# Patient Record
Sex: MALE | Race: WHITE | NOT HISPANIC OR LATINO | Employment: OTHER | URBAN - NONMETROPOLITAN AREA
[De-identification: names, ages, dates, MRNs, and addresses within clinical notes are randomized per-mention and may not be internally consistent; named-entity substitution may affect disease eponyms.]

---

## 2020-10-23 ENCOUNTER — APPOINTMENT (OUTPATIENT)
Dept: CT IMAGING | Facility: HOSPITAL | Age: 85
End: 2020-10-23

## 2020-10-23 ENCOUNTER — APPOINTMENT (OUTPATIENT)
Dept: GENERAL RADIOLOGY | Facility: HOSPITAL | Age: 85
End: 2020-10-23

## 2020-10-23 ENCOUNTER — HOSPITAL ENCOUNTER (INPATIENT)
Facility: HOSPITAL | Age: 85
LOS: 20 days | Discharge: NURSING, REHAB OR LONG-TERM CARE | End: 2020-11-12
Attending: EMERGENCY MEDICINE | Admitting: FAMILY MEDICINE

## 2020-10-23 DIAGNOSIS — J44.9 CHRONIC OBSTRUCTIVE PULMONARY DISEASE, UNSPECIFIED COPD TYPE (HCC): ICD-10-CM

## 2020-10-23 DIAGNOSIS — R74.01 TRANSAMINITIS: ICD-10-CM

## 2020-10-23 DIAGNOSIS — J18.9 PNEUMONIA OF BOTH LUNGS DUE TO INFECTIOUS ORGANISM, UNSPECIFIED PART OF LUNG: ICD-10-CM

## 2020-10-23 DIAGNOSIS — J96.21 ACUTE ON CHRONIC RESPIRATORY FAILURE WITH HYPOXIA (HCC): ICD-10-CM

## 2020-10-23 DIAGNOSIS — U07.1 COVID-19: Primary | ICD-10-CM

## 2020-10-23 DIAGNOSIS — N17.9 ACUTE RENAL FAILURE, UNSPECIFIED ACUTE RENAL FAILURE TYPE (HCC): ICD-10-CM

## 2020-10-23 DIAGNOSIS — J96.01 ACUTE RESPIRATORY FAILURE WITH HYPOXIA (HCC): ICD-10-CM

## 2020-10-23 PROBLEM — A41.9 SEPSIS, UNSPECIFIED ORGANISM (HCC): Status: ACTIVE | Noted: 2020-10-23

## 2020-10-23 LAB
A-A DO2: 293.7 MMHG
ALBUMIN SERPL-MCNC: 3.8 G/DL (ref 3.5–5.2)
ALBUMIN/GLOB SERPL: 1 G/DL
ALP SERPL-CCNC: 50 U/L (ref 39–117)
ALT SERPL W P-5'-P-CCNC: 64 U/L (ref 1–41)
ANION GAP SERPL CALCULATED.3IONS-SCNC: 16.2 MMOL/L (ref 5–15)
ARTERIAL PATENCY WRIST A: POSITIVE
AST SERPL-CCNC: 62 U/L (ref 1–40)
ATMOSPHERIC PRESS: 735 MMHG
BACTERIA UR QL AUTO: ABNORMAL /HPF
BASE EXCESS BLDA CALC-SCNC: -1.3 MMOL/L (ref 0–2)
BASOPHILS # BLD AUTO: 0.04 10*3/MM3 (ref 0–0.2)
BASOPHILS NFR BLD AUTO: 0.2 % (ref 0–1.5)
BDY SITE: ABNORMAL
BILIRUB SERPL-MCNC: 1 MG/DL (ref 0–1.2)
BILIRUB UR QL STRIP: ABNORMAL
BUN SERPL-MCNC: 105 MG/DL (ref 8–23)
BUN/CREAT SERPL: 34.1 (ref 7–25)
CALCIUM SPEC-SCNC: 8.7 MG/DL (ref 8.6–10.5)
CHLORIDE SERPL-SCNC: 109 MMOL/L (ref 98–107)
CLARITY UR: ABNORMAL
CO2 SERPL-SCNC: 22.8 MMOL/L (ref 22–29)
COHGB MFR BLD: <0.6 % (ref 0–2)
COLOR UR: ABNORMAL
CREAT SERPL-MCNC: 3.08 MG/DL (ref 0.76–1.27)
CRP SERPL-MCNC: 15.06 MG/DL (ref 0–0.5)
D-LACTATE SERPL-SCNC: 2 MMOL/L (ref 0.5–2)
DEPRECATED RDW RBC AUTO: 48.8 FL (ref 37–54)
EOSINOPHIL # BLD AUTO: 0 10*3/MM3 (ref 0–0.4)
EOSINOPHIL NFR BLD AUTO: 0 % (ref 0.3–6.2)
EPAP: 11
ERYTHROCYTE [DISTWIDTH] IN BLOOD BY AUTOMATED COUNT: 14.6 % (ref 12.3–15.4)
FERRITIN SERPL-MCNC: 2364 NG/ML (ref 30–400)
GFR SERPL CREATININE-BSD FRML MDRD: 19 ML/MIN/1.73
GLOBULIN UR ELPH-MCNC: 3.8 GM/DL
GLUCOSE BLDC GLUCOMTR-MCNC: 203 MG/DL (ref 70–130)
GLUCOSE SERPL-MCNC: 164 MG/DL (ref 65–99)
GLUCOSE UR STRIP-MCNC: NEGATIVE MG/DL
HCO3 BLDA-SCNC: 20.8 MMOL/L (ref 22–28)
HCT VFR BLD AUTO: 52.5 % (ref 37.5–51)
HCT VFR BLD CALC: 52 %
HGB BLD-MCNC: 17 G/DL (ref 13–17.7)
HGB UR QL STRIP.AUTO: NEGATIVE
HOLD SPECIMEN: NORMAL
HYALINE CASTS UR QL AUTO: ABNORMAL /LPF
IMM GRANULOCYTES # BLD AUTO: 0.1 10*3/MM3 (ref 0–0.05)
IMM GRANULOCYTES NFR BLD AUTO: 0.6 % (ref 0–0.5)
INHALED O2 CONCENTRATION: 70 %
IPAP: 20
KETONES UR QL STRIP: NEGATIVE
LEUKOCYTE ESTERASE UR QL STRIP.AUTO: NEGATIVE
LIPASE SERPL-CCNC: 18 U/L (ref 13–60)
LYMPHOCYTES # BLD AUTO: 0.48 10*3/MM3 (ref 0.7–3.1)
LYMPHOCYTES NFR BLD AUTO: 2.9 % (ref 19.6–45.3)
MCH RBC QN AUTO: 29.6 PG (ref 26.6–33)
MCHC RBC AUTO-ENTMCNC: 32.4 G/DL (ref 31.5–35.7)
MCV RBC AUTO: 91.3 FL (ref 79–97)
METHGB BLD QL: 1 % (ref 0–1.5)
MODALITY: ABNORMAL
MONOCYTES # BLD AUTO: 1.01 10*3/MM3 (ref 0.1–0.9)
MONOCYTES NFR BLD AUTO: 6.2 % (ref 5–12)
MUCOUS THREADS URNS QL MICRO: ABNORMAL /HPF
NEUTROPHILS NFR BLD AUTO: 14.65 10*3/MM3 (ref 1.7–7)
NEUTROPHILS NFR BLD AUTO: 90.1 % (ref 42.7–76)
NITRITE UR QL STRIP: NEGATIVE
NOTE: ABNORMAL
NRBC BLD AUTO-RTO: 0 /100 WBC (ref 0–0.2)
NT-PROBNP SERPL-MCNC: 988.6 PG/ML (ref 0–1800)
OXYHGB MFR BLDV: 98 % (ref 94–99)
PCO2 BLDA: 28.7 MM HG (ref 35–45)
PCO2 TEMP ADJ BLD: ABNORMAL MM[HG]
PH BLDA: 7.47 PH UNITS (ref 7.3–7.5)
PH UR STRIP.AUTO: <=5 [PH] (ref 5–8)
PH, TEMP CORRECTED: ABNORMAL
PLATELET # BLD AUTO: 227 10*3/MM3 (ref 140–450)
PMV BLD AUTO: 11.4 FL (ref 6–12)
PO2 BLDA: 157 MM HG (ref 75–100)
PO2 TEMP ADJ BLD: ABNORMAL MM[HG]
POTASSIUM SERPL-SCNC: 5.3 MMOL/L (ref 3.5–5.2)
PROCALCITONIN SERPL-MCNC: 4.62 NG/ML (ref 0–0.25)
PROT SERPL-MCNC: 7.6 G/DL (ref 6–8.5)
PROT UR QL STRIP: ABNORMAL
RBC # BLD AUTO: 5.75 10*6/MM3 (ref 4.14–5.8)
RBC # UR: ABNORMAL /HPF
REF LAB TEST METHOD: ABNORMAL
SAO2 % BLDCOA: 99.1 % (ref 94–100)
SARS-COV-2 RNA PNL SPEC NAA+PROBE: DETECTED
SET MECH RESP RATE: 16
SODIUM SERPL-SCNC: 148 MMOL/L (ref 136–145)
SP GR UR STRIP: 1.02 (ref 1–1.03)
SQUAMOUS #/AREA URNS HPF: ABNORMAL /HPF
TROPONIN T SERPL-MCNC: 0.03 NG/ML (ref 0–0.03)
TSH SERPL DL<=0.05 MIU/L-ACNC: 0.2 UIU/ML (ref 0.27–4.2)
UROBILINOGEN UR QL STRIP: ABNORMAL
VENTILATOR MODE: ABNORMAL
WBC # BLD AUTO: 16.28 10*3/MM3 (ref 3.4–10.8)
WBC UR QL AUTO: ABNORMAL /HPF
WHOLE BLOOD HOLD SPECIMEN: NORMAL

## 2020-10-23 PROCEDURE — 86140 C-REACTIVE PROTEIN: CPT | Performed by: EMERGENCY MEDICINE

## 2020-10-23 PROCEDURE — 94799 UNLISTED PULMONARY SVC/PX: CPT

## 2020-10-23 PROCEDURE — 84443 ASSAY THYROID STIM HORMONE: CPT | Performed by: EMERGENCY MEDICINE

## 2020-10-23 PROCEDURE — 83605 ASSAY OF LACTIC ACID: CPT | Performed by: EMERGENCY MEDICINE

## 2020-10-23 PROCEDURE — 99223 1ST HOSP IP/OBS HIGH 75: CPT | Performed by: EMERGENCY MEDICINE

## 2020-10-23 PROCEDURE — 94660 CPAP INITIATION&MGMT: CPT

## 2020-10-23 PROCEDURE — 25010000002 ENOXAPARIN PER 10 MG: Performed by: EMERGENCY MEDICINE

## 2020-10-23 PROCEDURE — 71250 CT THORAX DX C-: CPT

## 2020-10-23 PROCEDURE — 93005 ELECTROCARDIOGRAM TRACING: CPT | Performed by: EMERGENCY MEDICINE

## 2020-10-23 PROCEDURE — 83690 ASSAY OF LIPASE: CPT | Performed by: EMERGENCY MEDICINE

## 2020-10-23 PROCEDURE — 82728 ASSAY OF FERRITIN: CPT | Performed by: EMERGENCY MEDICINE

## 2020-10-23 PROCEDURE — 82805 BLOOD GASES W/O2 SATURATION: CPT

## 2020-10-23 PROCEDURE — 71045 X-RAY EXAM CHEST 1 VIEW: CPT

## 2020-10-23 PROCEDURE — 81001 URINALYSIS AUTO W/SCOPE: CPT | Performed by: EMERGENCY MEDICINE

## 2020-10-23 PROCEDURE — 82962 GLUCOSE BLOOD TEST: CPT

## 2020-10-23 PROCEDURE — 25010000002 METHYLPREDNISOLONE PER 125 MG: Performed by: EMERGENCY MEDICINE

## 2020-10-23 PROCEDURE — 85025 COMPLETE CBC W/AUTO DIFF WBC: CPT | Performed by: EMERGENCY MEDICINE

## 2020-10-23 PROCEDURE — 99285 EMERGENCY DEPT VISIT HI MDM: CPT

## 2020-10-23 PROCEDURE — 25010000002 LEVOFLOXACIN PER 250 MG: Performed by: EMERGENCY MEDICINE

## 2020-10-23 PROCEDURE — 25010000002 CEFTRIAXONE SODIUM-DEXTROSE 1-3.74 GM-%(50ML) RECONSTITUTED SOLUTION: Performed by: EMERGENCY MEDICINE

## 2020-10-23 PROCEDURE — 25010000002 AZITHROMYCIN 500 MG/250 ML: Performed by: EMERGENCY MEDICINE

## 2020-10-23 PROCEDURE — 83880 ASSAY OF NATRIURETIC PEPTIDE: CPT | Performed by: EMERGENCY MEDICINE

## 2020-10-23 PROCEDURE — 87040 BLOOD CULTURE FOR BACTERIA: CPT | Performed by: EMERGENCY MEDICINE

## 2020-10-23 PROCEDURE — P9612 CATHETERIZE FOR URINE SPEC: HCPCS

## 2020-10-23 PROCEDURE — 87635 SARS-COV-2 COVID-19 AMP PRB: CPT | Performed by: EMERGENCY MEDICINE

## 2020-10-23 PROCEDURE — 94640 AIRWAY INHALATION TREATMENT: CPT

## 2020-10-23 PROCEDURE — 25010000002 PIPERACILLIN SOD-TAZOBACTAM PER 1 G: Performed by: EMERGENCY MEDICINE

## 2020-10-23 PROCEDURE — 70450 CT HEAD/BRAIN W/O DYE: CPT

## 2020-10-23 PROCEDURE — 25010000002 DEXAMETHASONE PER 1 MG: Performed by: EMERGENCY MEDICINE

## 2020-10-23 PROCEDURE — 82375 ASSAY CARBOXYHB QUANT: CPT

## 2020-10-23 PROCEDURE — 84145 PROCALCITONIN (PCT): CPT | Performed by: EMERGENCY MEDICINE

## 2020-10-23 PROCEDURE — 80053 COMPREHEN METABOLIC PANEL: CPT | Performed by: EMERGENCY MEDICINE

## 2020-10-23 PROCEDURE — 83050 HGB METHEMOGLOBIN QUAN: CPT

## 2020-10-23 PROCEDURE — 84484 ASSAY OF TROPONIN QUANT: CPT | Performed by: EMERGENCY MEDICINE

## 2020-10-23 PROCEDURE — 36600 WITHDRAWAL OF ARTERIAL BLOOD: CPT

## 2020-10-23 RX ORDER — IPRATROPIUM BROMIDE AND ALBUTEROL SULFATE 2.5; .5 MG/3ML; MG/3ML
3 SOLUTION RESPIRATORY (INHALATION) ONCE
Status: DISCONTINUED | OUTPATIENT
Start: 2020-10-23 | End: 2020-10-27

## 2020-10-23 RX ORDER — BUDESONIDE AND FORMOTEROL FUMARATE DIHYDRATE 160; 4.5 UG/1; UG/1
2 AEROSOL RESPIRATORY (INHALATION)
Status: DISCONTINUED | OUTPATIENT
Start: 2020-10-23 | End: 2020-11-12 | Stop reason: HOSPADM

## 2020-10-23 RX ORDER — ALBUTEROL SULFATE 90 UG/1
2 AEROSOL, METERED RESPIRATORY (INHALATION) EVERY 4 HOURS PRN
COMMUNITY

## 2020-10-23 RX ORDER — SODIUM CHLORIDE 0.9 % (FLUSH) 0.9 %
10 SYRINGE (ML) INJECTION AS NEEDED
Status: DISCONTINUED | OUTPATIENT
Start: 2020-10-23 | End: 2020-11-12 | Stop reason: HOSPADM

## 2020-10-23 RX ORDER — ALBUTEROL SULFATE 90 UG/1
2 AEROSOL, METERED RESPIRATORY (INHALATION) EVERY 4 HOURS PRN
Status: DISCONTINUED | OUTPATIENT
Start: 2020-10-23 | End: 2020-11-12 | Stop reason: HOSPADM

## 2020-10-23 RX ORDER — SODIUM CHLORIDE 0.9 % (FLUSH) 0.9 %
10 SYRINGE (ML) INJECTION EVERY 12 HOURS SCHEDULED
Status: DISCONTINUED | OUTPATIENT
Start: 2020-10-23 | End: 2020-11-12 | Stop reason: HOSPADM

## 2020-10-23 RX ORDER — LEVOFLOXACIN 5 MG/ML
500 INJECTION, SOLUTION INTRAVENOUS
Status: DISCONTINUED | OUTPATIENT
Start: 2020-10-23 | End: 2020-10-25

## 2020-10-23 RX ORDER — TERAZOSIN 5 MG/1
5 CAPSULE ORAL NIGHTLY
Status: DISCONTINUED | OUTPATIENT
Start: 2020-10-23 | End: 2020-11-04

## 2020-10-23 RX ORDER — MONTELUKAST SODIUM 10 MG/1
10 TABLET ORAL NIGHTLY
Status: DISCONTINUED | OUTPATIENT
Start: 2020-10-23 | End: 2020-11-04

## 2020-10-23 RX ORDER — MONTELUKAST SODIUM 10 MG/1
10 TABLET ORAL NIGHTLY
COMMUNITY

## 2020-10-23 RX ORDER — METHYLPREDNISOLONE SODIUM SUCCINATE 125 MG/2ML
125 INJECTION, POWDER, LYOPHILIZED, FOR SOLUTION INTRAMUSCULAR; INTRAVENOUS ONCE
Status: COMPLETED | OUTPATIENT
Start: 2020-10-23 | End: 2020-10-23

## 2020-10-23 RX ORDER — LISINOPRIL 40 MG/1
40 TABLET ORAL DAILY
COMMUNITY
End: 2020-11-12 | Stop reason: HOSPADM

## 2020-10-23 RX ORDER — CEFTRIAXONE 1 G/50ML
1 INJECTION, SOLUTION INTRAVENOUS ONCE
Status: COMPLETED | OUTPATIENT
Start: 2020-10-23 | End: 2020-10-23

## 2020-10-23 RX ORDER — DEXAMETHASONE SODIUM PHOSPHATE 4 MG/ML
6 INJECTION, SOLUTION INTRA-ARTICULAR; INTRALESIONAL; INTRAMUSCULAR; INTRAVENOUS; SOFT TISSUE EVERY 12 HOURS
Status: DISCONTINUED | OUTPATIENT
Start: 2020-10-23 | End: 2020-10-26

## 2020-10-23 RX ORDER — ASPIRIN 81 MG/1
81 TABLET, CHEWABLE ORAL DAILY
Status: DISCONTINUED | OUTPATIENT
Start: 2020-10-23 | End: 2020-11-12 | Stop reason: HOSPADM

## 2020-10-23 RX ORDER — TERAZOSIN 5 MG/1
5 CAPSULE ORAL NIGHTLY
COMMUNITY

## 2020-10-23 RX ORDER — CEFTRIAXONE 1 G/50ML
1 INJECTION, SOLUTION INTRAVENOUS EVERY 24 HOURS
Status: DISCONTINUED | OUTPATIENT
Start: 2020-10-24 | End: 2020-10-25

## 2020-10-23 RX ORDER — BUDESONIDE AND FORMOTEROL FUMARATE DIHYDRATE 160; 4.5 UG/1; UG/1
2 AEROSOL RESPIRATORY (INHALATION)
COMMUNITY

## 2020-10-23 RX ORDER — PANTOPRAZOLE SODIUM 40 MG/1
40 TABLET, DELAYED RELEASE ORAL EVERY MORNING
Status: DISCONTINUED | OUTPATIENT
Start: 2020-10-24 | End: 2020-11-04

## 2020-10-23 RX ORDER — ACETAMINOPHEN 325 MG/1
650 TABLET ORAL EVERY 6 HOURS PRN
COMMUNITY

## 2020-10-23 RX ADMIN — AZITHROMYCIN 500 MG: 500 INJECTION, POWDER, LYOPHILIZED, FOR SOLUTION INTRAVENOUS at 17:15

## 2020-10-23 RX ADMIN — LEVOFLOXACIN 500 MG: 5 INJECTION, SOLUTION INTRAVENOUS at 20:25

## 2020-10-23 RX ADMIN — REMDESIVIR 200 MG: 100 INJECTION, POWDER, LYOPHILIZED, FOR SOLUTION INTRAVENOUS at 20:26

## 2020-10-23 RX ADMIN — SODIUM CHLORIDE 1000 ML: 9 INJECTION, SOLUTION INTRAVENOUS at 16:28

## 2020-10-23 RX ADMIN — DEXAMETHASONE SODIUM PHOSPHATE 6 MG: 4 INJECTION, SOLUTION INTRAMUSCULAR; INTRAVENOUS at 20:24

## 2020-10-23 RX ADMIN — METHYLPREDNISOLONE SODIUM SUCCINATE 125 MG: 125 INJECTION, POWDER, FOR SOLUTION INTRAMUSCULAR; INTRAVENOUS at 15:47

## 2020-10-23 RX ADMIN — SODIUM CHLORIDE, PRESERVATIVE FREE 10 ML: 5 INJECTION INTRAVENOUS at 22:25

## 2020-10-23 RX ADMIN — CEFTRIAXONE 1 G: 1 INJECTION, SOLUTION INTRAVENOUS at 17:15

## 2020-10-23 RX ADMIN — TAZOBACTAM SODIUM AND PIPERACILLIN SODIUM 3.38 G: 375; 3 INJECTION, SOLUTION INTRAVENOUS at 20:25

## 2020-10-23 RX ADMIN — BUDESONIDE AND FORMOTEROL FUMARATE DIHYDRATE 2 PUFF: 160; 4.5 AEROSOL RESPIRATORY (INHALATION) at 21:14

## 2020-10-23 RX ADMIN — ENOXAPARIN SODIUM 40 MG: 40 INJECTION SUBCUTANEOUS at 20:25

## 2020-10-24 LAB
ALBUMIN SERPL-MCNC: 2.9 G/DL (ref 3.5–5.2)
ALBUMIN/GLOB SERPL: 0.8 G/DL
ALP SERPL-CCNC: 43 U/L (ref 39–117)
ALT SERPL W P-5'-P-CCNC: 53 U/L (ref 1–41)
ANION GAP SERPL CALCULATED.3IONS-SCNC: 16.2 MMOL/L (ref 5–15)
AST SERPL-CCNC: 59 U/L (ref 1–40)
BASOPHILS # BLD AUTO: 0.03 10*3/MM3 (ref 0–0.2)
BASOPHILS NFR BLD AUTO: 0.2 % (ref 0–1.5)
BILIRUB SERPL-MCNC: 0.5 MG/DL (ref 0–1.2)
BUN SERPL-MCNC: 110 MG/DL (ref 8–23)
BUN/CREAT SERPL: 39 (ref 7–25)
CALCIUM SPEC-SCNC: 7.7 MG/DL (ref 8.6–10.5)
CHLORIDE SERPL-SCNC: 116 MMOL/L (ref 98–107)
CO2 SERPL-SCNC: 18.8 MMOL/L (ref 22–29)
CREAT SERPL-MCNC: 2.82 MG/DL (ref 0.76–1.27)
DEPRECATED RDW RBC AUTO: 49.7 FL (ref 37–54)
EOSINOPHIL # BLD AUTO: 0 10*3/MM3 (ref 0–0.4)
EOSINOPHIL NFR BLD AUTO: 0 % (ref 0.3–6.2)
ERYTHROCYTE [DISTWIDTH] IN BLOOD BY AUTOMATED COUNT: 14.6 % (ref 12.3–15.4)
FERRITIN SERPL-MCNC: 1929 NG/ML (ref 30–400)
GFR SERPL CREATININE-BSD FRML MDRD: 22 ML/MIN/1.73
GLOBULIN UR ELPH-MCNC: 3.6 GM/DL
GLUCOSE BLDC GLUCOMTR-MCNC: 197 MG/DL (ref 70–130)
GLUCOSE BLDC GLUCOMTR-MCNC: 214 MG/DL (ref 70–130)
GLUCOSE BLDC GLUCOMTR-MCNC: 255 MG/DL (ref 70–130)
GLUCOSE BLDC GLUCOMTR-MCNC: 298 MG/DL (ref 70–130)
GLUCOSE SERPL-MCNC: 249 MG/DL (ref 65–99)
HBA1C MFR BLD: 6.2 % (ref 4.8–5.6)
HCT VFR BLD AUTO: 47.6 % (ref 37.5–51)
HGB BLD-MCNC: 15.3 G/DL (ref 13–17.7)
IMM GRANULOCYTES # BLD AUTO: 0.1 10*3/MM3 (ref 0–0.05)
IMM GRANULOCYTES NFR BLD AUTO: 0.6 % (ref 0–0.5)
LYMPHOCYTES # BLD AUTO: 0.48 10*3/MM3 (ref 0.7–3.1)
LYMPHOCYTES NFR BLD AUTO: 2.7 % (ref 19.6–45.3)
MCH RBC QN AUTO: 29.7 PG (ref 26.6–33)
MCHC RBC AUTO-ENTMCNC: 32.1 G/DL (ref 31.5–35.7)
MCV RBC AUTO: 92.2 FL (ref 79–97)
MONOCYTES # BLD AUTO: 0.77 10*3/MM3 (ref 0.1–0.9)
MONOCYTES NFR BLD AUTO: 4.3 % (ref 5–12)
NEUTROPHILS NFR BLD AUTO: 16.65 10*3/MM3 (ref 1.7–7)
NEUTROPHILS NFR BLD AUTO: 92.2 % (ref 42.7–76)
NRBC BLD AUTO-RTO: 0 /100 WBC (ref 0–0.2)
PLATELET # BLD AUTO: 176 10*3/MM3 (ref 140–450)
PMV BLD AUTO: 12.2 FL (ref 6–12)
POTASSIUM SERPL-SCNC: 4.8 MMOL/L (ref 3.5–5.2)
PROCALCITONIN SERPL-MCNC: 3.72 NG/ML (ref 0–0.25)
PROT SERPL-MCNC: 6.5 G/DL (ref 6–8.5)
RBC # BLD AUTO: 5.16 10*6/MM3 (ref 4.14–5.8)
SODIUM SERPL-SCNC: 151 MMOL/L (ref 136–145)
SODIUM UR-SCNC: <20 MMOL/L
WBC # BLD AUTO: 18.03 10*3/MM3 (ref 3.4–10.8)

## 2020-10-24 PROCEDURE — 85025 COMPLETE CBC W/AUTO DIFF WBC: CPT | Performed by: EMERGENCY MEDICINE

## 2020-10-24 PROCEDURE — 94660 CPAP INITIATION&MGMT: CPT

## 2020-10-24 PROCEDURE — 94799 UNLISTED PULMONARY SVC/PX: CPT

## 2020-10-24 PROCEDURE — 25010000002 DEXAMETHASONE PER 1 MG: Performed by: EMERGENCY MEDICINE

## 2020-10-24 PROCEDURE — 80053 COMPREHEN METABOLIC PANEL: CPT | Performed by: EMERGENCY MEDICINE

## 2020-10-24 PROCEDURE — 63710000001 INSULIN ASPART PER 5 UNITS: Performed by: EMERGENCY MEDICINE

## 2020-10-24 PROCEDURE — 84145 PROCALCITONIN (PCT): CPT | Performed by: EMERGENCY MEDICINE

## 2020-10-24 PROCEDURE — 84300 ASSAY OF URINE SODIUM: CPT | Performed by: INTERNAL MEDICINE

## 2020-10-24 PROCEDURE — 25010000002 ENOXAPARIN PER 10 MG: Performed by: EMERGENCY MEDICINE

## 2020-10-24 PROCEDURE — 99233 SBSQ HOSP IP/OBS HIGH 50: CPT | Performed by: INTERNAL MEDICINE

## 2020-10-24 PROCEDURE — 83036 HEMOGLOBIN GLYCOSYLATED A1C: CPT | Performed by: EMERGENCY MEDICINE

## 2020-10-24 PROCEDURE — 82962 GLUCOSE BLOOD TEST: CPT

## 2020-10-24 PROCEDURE — 25010000002 CEFTRIAXONE SODIUM-DEXTROSE 1-3.74 GM-%(50ML) RECONSTITUTED SOLUTION: Performed by: EMERGENCY MEDICINE

## 2020-10-24 PROCEDURE — 82728 ASSAY OF FERRITIN: CPT | Performed by: EMERGENCY MEDICINE

## 2020-10-24 PROCEDURE — 63710000001 INSULIN DETEMIR PER 5 UNITS: Performed by: EMERGENCY MEDICINE

## 2020-10-24 RX ORDER — DEXTROSE MONOHYDRATE 25 G/50ML
25 INJECTION, SOLUTION INTRAVENOUS
Status: DISCONTINUED | OUTPATIENT
Start: 2020-10-24 | End: 2020-11-12 | Stop reason: HOSPADM

## 2020-10-24 RX ORDER — NICOTINE POLACRILEX 4 MG
1 LOZENGE BUCCAL
Status: DISCONTINUED | OUTPATIENT
Start: 2020-10-24 | End: 2020-11-12 | Stop reason: HOSPADM

## 2020-10-24 RX ORDER — DEXTROSE MONOHYDRATE 50 MG/ML
150 INJECTION, SOLUTION INTRAVENOUS CONTINUOUS
Status: DISCONTINUED | OUTPATIENT
Start: 2020-10-24 | End: 2020-10-25

## 2020-10-24 RX ADMIN — INSULIN ASPART 4 UNITS: 100 INJECTION, SOLUTION INTRAVENOUS; SUBCUTANEOUS at 08:23

## 2020-10-24 RX ADMIN — DEXTROSE MONOHYDRATE 150 ML/HR: 50 INJECTION, SOLUTION INTRAVENOUS at 07:01

## 2020-10-24 RX ADMIN — INSULIN ASPART 4 UNITS: 100 INJECTION, SOLUTION INTRAVENOUS; SUBCUTANEOUS at 14:05

## 2020-10-24 RX ADMIN — INSULIN ASPART 3 UNITS: 100 INJECTION, SOLUTION INTRAVENOUS; SUBCUTANEOUS at 18:10

## 2020-10-24 RX ADMIN — SODIUM CHLORIDE, PRESERVATIVE FREE 10 ML: 5 INJECTION INTRAVENOUS at 08:23

## 2020-10-24 RX ADMIN — SODIUM CHLORIDE, PRESERVATIVE FREE 10 ML: 5 INJECTION INTRAVENOUS at 20:14

## 2020-10-24 RX ADMIN — ENOXAPARIN SODIUM 40 MG: 40 INJECTION SUBCUTANEOUS at 18:09

## 2020-10-24 RX ADMIN — INSULIN DETEMIR 10 UNITS: 100 INJECTION, SOLUTION SUBCUTANEOUS at 20:13

## 2020-10-24 RX ADMIN — DEXTROSE MONOHYDRATE 150 ML/HR: 50 INJECTION, SOLUTION INTRAVENOUS at 21:52

## 2020-10-24 RX ADMIN — DEXAMETHASONE SODIUM PHOSPHATE 6 MG: 4 INJECTION, SOLUTION INTRAMUSCULAR; INTRAVENOUS at 18:09

## 2020-10-24 RX ADMIN — BUDESONIDE AND FORMOTEROL FUMARATE DIHYDRATE 2 PUFF: 160; 4.5 AEROSOL RESPIRATORY (INHALATION) at 08:01

## 2020-10-24 RX ADMIN — CEFTRIAXONE 1 G: 1 INJECTION, SOLUTION INTRAVENOUS at 18:09

## 2020-10-24 RX ADMIN — INSULIN DETEMIR 10 UNITS: 100 INJECTION, SOLUTION SUBCUTANEOUS at 07:01

## 2020-10-24 RX ADMIN — DEXTROSE MONOHYDRATE 150 ML/HR: 50 INJECTION, SOLUTION INTRAVENOUS at 14:05

## 2020-10-24 RX ADMIN — DEXAMETHASONE SODIUM PHOSPHATE 6 MG: 4 INJECTION, SOLUTION INTRAMUSCULAR; INTRAVENOUS at 06:17

## 2020-10-24 RX ADMIN — BUDESONIDE AND FORMOTEROL FUMARATE DIHYDRATE 2 PUFF: 160; 4.5 AEROSOL RESPIRATORY (INHALATION) at 20:02

## 2020-10-25 LAB
ANION GAP SERPL CALCULATED.3IONS-SCNC: 10.6 MMOL/L (ref 5–15)
BASOPHILS # BLD AUTO: 0.02 10*3/MM3 (ref 0–0.2)
BASOPHILS NFR BLD AUTO: 0.1 % (ref 0–1.5)
BUN SERPL-MCNC: 101 MG/DL (ref 8–23)
BUN/CREAT SERPL: 43.7 (ref 7–25)
CALCIUM SPEC-SCNC: 7.5 MG/DL (ref 8.6–10.5)
CHLORIDE SERPL-SCNC: 112 MMOL/L (ref 98–107)
CO2 SERPL-SCNC: 21.4 MMOL/L (ref 22–29)
CREAT SERPL-MCNC: 2.31 MG/DL (ref 0.76–1.27)
DEPRECATED RDW RBC AUTO: 49.6 FL (ref 37–54)
EOSINOPHIL # BLD AUTO: 0 10*3/MM3 (ref 0–0.4)
EOSINOPHIL NFR BLD AUTO: 0 % (ref 0.3–6.2)
ERYTHROCYTE [DISTWIDTH] IN BLOOD BY AUTOMATED COUNT: 14.3 % (ref 12.3–15.4)
GFR SERPL CREATININE-BSD FRML MDRD: 27 ML/MIN/1.73
GLUCOSE BLDC GLUCOMTR-MCNC: 179 MG/DL (ref 70–130)
GLUCOSE BLDC GLUCOMTR-MCNC: 200 MG/DL (ref 70–130)
GLUCOSE BLDC GLUCOMTR-MCNC: 208 MG/DL (ref 70–130)
GLUCOSE BLDC GLUCOMTR-MCNC: 217 MG/DL (ref 70–130)
GLUCOSE SERPL-MCNC: 224 MG/DL (ref 65–99)
HCT VFR BLD AUTO: 43.8 % (ref 37.5–51)
HGB BLD-MCNC: 13.7 G/DL (ref 13–17.7)
IMM GRANULOCYTES # BLD AUTO: 0.13 10*3/MM3 (ref 0–0.05)
IMM GRANULOCYTES NFR BLD AUTO: 0.7 % (ref 0–0.5)
LYMPHOCYTES # BLD AUTO: 0.41 10*3/MM3 (ref 0.7–3.1)
LYMPHOCYTES NFR BLD AUTO: 2.2 % (ref 19.6–45.3)
MCH RBC QN AUTO: 29.3 PG (ref 26.6–33)
MCHC RBC AUTO-ENTMCNC: 31.3 G/DL (ref 31.5–35.7)
MCV RBC AUTO: 93.6 FL (ref 79–97)
MONOCYTES # BLD AUTO: 0.71 10*3/MM3 (ref 0.1–0.9)
MONOCYTES NFR BLD AUTO: 3.9 % (ref 5–12)
NEUTROPHILS NFR BLD AUTO: 17.07 10*3/MM3 (ref 1.7–7)
NEUTROPHILS NFR BLD AUTO: 93.1 % (ref 42.7–76)
NRBC BLD AUTO-RTO: 0 /100 WBC (ref 0–0.2)
PLATELET # BLD AUTO: 178 10*3/MM3 (ref 140–450)
PMV BLD AUTO: 12.6 FL (ref 6–12)
POTASSIUM SERPL-SCNC: 4.1 MMOL/L (ref 3.5–5.2)
RBC # BLD AUTO: 4.68 10*6/MM3 (ref 4.14–5.8)
SODIUM SERPL-SCNC: 144 MMOL/L (ref 136–145)
SODIUM UR-SCNC: <20 MMOL/L
WBC # BLD AUTO: 18.34 10*3/MM3 (ref 3.4–10.8)

## 2020-10-25 PROCEDURE — 63710000001 INSULIN DETEMIR PER 5 UNITS: Performed by: INTERNAL MEDICINE

## 2020-10-25 PROCEDURE — 25010000002 DEXAMETHASONE PER 1 MG: Performed by: EMERGENCY MEDICINE

## 2020-10-25 PROCEDURE — 94799 UNLISTED PULMONARY SVC/PX: CPT

## 2020-10-25 PROCEDURE — 63710000001 INSULIN ASPART PER 5 UNITS: Performed by: EMERGENCY MEDICINE

## 2020-10-25 PROCEDURE — 82962 GLUCOSE BLOOD TEST: CPT

## 2020-10-25 PROCEDURE — 25010000002 LEVOFLOXACIN PER 250 MG: Performed by: EMERGENCY MEDICINE

## 2020-10-25 PROCEDURE — 84300 ASSAY OF URINE SODIUM: CPT | Performed by: INTERNAL MEDICINE

## 2020-10-25 PROCEDURE — 80048 BASIC METABOLIC PNL TOTAL CA: CPT | Performed by: INTERNAL MEDICINE

## 2020-10-25 PROCEDURE — 25010000002 ENOXAPARIN PER 10 MG: Performed by: EMERGENCY MEDICINE

## 2020-10-25 PROCEDURE — 99232 SBSQ HOSP IP/OBS MODERATE 35: CPT | Performed by: INTERNAL MEDICINE

## 2020-10-25 PROCEDURE — 85025 COMPLETE CBC W/AUTO DIFF WBC: CPT | Performed by: INTERNAL MEDICINE

## 2020-10-25 RX ORDER — LEVOFLOXACIN 5 MG/ML
750 INJECTION, SOLUTION INTRAVENOUS
Status: DISCONTINUED | OUTPATIENT
Start: 2020-10-25 | End: 2020-10-29

## 2020-10-25 RX ORDER — DEXTROSE AND SODIUM CHLORIDE 5; .45 G/100ML; G/100ML
75 INJECTION, SOLUTION INTRAVENOUS CONTINUOUS
Status: DISCONTINUED | OUTPATIENT
Start: 2020-10-25 | End: 2020-10-26

## 2020-10-25 RX ADMIN — ENOXAPARIN SODIUM 40 MG: 40 INJECTION SUBCUTANEOUS at 18:45

## 2020-10-25 RX ADMIN — BUDESONIDE AND FORMOTEROL FUMARATE DIHYDRATE 2 PUFF: 160; 4.5 AEROSOL RESPIRATORY (INHALATION) at 21:47

## 2020-10-25 RX ADMIN — SODIUM CHLORIDE, PRESERVATIVE FREE 10 ML: 5 INJECTION INTRAVENOUS at 21:46

## 2020-10-25 RX ADMIN — INSULIN DETEMIR 15 UNITS: 100 INJECTION, SOLUTION SUBCUTANEOUS at 22:00

## 2020-10-25 RX ADMIN — BUDESONIDE AND FORMOTEROL FUMARATE DIHYDRATE 2 PUFF: 160; 4.5 AEROSOL RESPIRATORY (INHALATION) at 07:00

## 2020-10-25 RX ADMIN — DEXAMETHASONE SODIUM PHOSPHATE 6 MG: 4 INJECTION, SOLUTION INTRAMUSCULAR; INTRAVENOUS at 18:45

## 2020-10-25 RX ADMIN — LEVOFLOXACIN 750 MG: 5 INJECTION, SOLUTION INTRAVENOUS at 21:45

## 2020-10-25 RX ADMIN — INSULIN ASPART 2 UNITS: 100 INJECTION, SOLUTION INTRAVENOUS; SUBCUTANEOUS at 22:41

## 2020-10-25 RX ADMIN — INSULIN ASPART 3 UNITS: 100 INJECTION, SOLUTION INTRAVENOUS; SUBCUTANEOUS at 12:05

## 2020-10-25 RX ADMIN — MONTELUKAST 10 MG: 10 TABLET, FILM COATED ORAL at 21:45

## 2020-10-25 RX ADMIN — SODIUM CHLORIDE, PRESERVATIVE FREE 10 ML: 5 INJECTION INTRAVENOUS at 08:15

## 2020-10-25 RX ADMIN — DEXTROSE AND SODIUM CHLORIDE 75 ML/HR: 5; 450 INJECTION, SOLUTION INTRAVENOUS at 10:35

## 2020-10-25 RX ADMIN — ASPIRIN 81 MG CHEWABLE TABLET 81 MG: 81 TABLET CHEWABLE at 08:16

## 2020-10-25 RX ADMIN — INSULIN ASPART 3 UNITS: 100 INJECTION, SOLUTION INTRAVENOUS; SUBCUTANEOUS at 16:54

## 2020-10-25 RX ADMIN — DEXAMETHASONE SODIUM PHOSPHATE 6 MG: 4 INJECTION, SOLUTION INTRAMUSCULAR; INTRAVENOUS at 06:08

## 2020-10-25 RX ADMIN — TERAZOSIN HYDROCHLORIDE 5 MG: 5 CAPSULE ORAL at 21:45

## 2020-10-25 RX ADMIN — INSULIN ASPART 3 UNITS: 100 INJECTION, SOLUTION INTRAVENOUS; SUBCUTANEOUS at 06:18

## 2020-10-25 RX ADMIN — DEXTROSE AND SODIUM CHLORIDE 75 ML/HR: 5; 450 INJECTION, SOLUTION INTRAVENOUS at 21:45

## 2020-10-26 LAB
ALBUMIN SERPL-MCNC: 2.6 G/DL (ref 3.5–5.2)
ALBUMIN/GLOB SERPL: 0.9 G/DL
ALP SERPL-CCNC: 43 U/L (ref 39–117)
ALT SERPL W P-5'-P-CCNC: 38 U/L (ref 1–41)
ANION GAP SERPL CALCULATED.3IONS-SCNC: 14.2 MMOL/L (ref 5–15)
AST SERPL-CCNC: 31 U/L (ref 1–40)
BASOPHILS # BLD AUTO: 0.03 10*3/MM3 (ref 0–0.2)
BASOPHILS NFR BLD AUTO: 0.2 % (ref 0–1.5)
BILIRUB SERPL-MCNC: 0.6 MG/DL (ref 0–1.2)
BUN SERPL-MCNC: 73 MG/DL (ref 8–23)
BUN/CREAT SERPL: 43.7 (ref 7–25)
CALCIUM SPEC-SCNC: 7.6 MG/DL (ref 8.6–10.5)
CHLORIDE SERPL-SCNC: 114 MMOL/L (ref 98–107)
CO2 SERPL-SCNC: 17.8 MMOL/L (ref 22–29)
CREAT SERPL-MCNC: 1.67 MG/DL (ref 0.76–1.27)
DEPRECATED RDW RBC AUTO: 45.1 FL (ref 37–54)
EOSINOPHIL # BLD AUTO: 0 10*3/MM3 (ref 0–0.4)
EOSINOPHIL NFR BLD AUTO: 0 % (ref 0.3–6.2)
ERYTHROCYTE [DISTWIDTH] IN BLOOD BY AUTOMATED COUNT: 13.9 % (ref 12.3–15.4)
GFR SERPL CREATININE-BSD FRML MDRD: 39 ML/MIN/1.73
GLOBULIN UR ELPH-MCNC: 2.8 GM/DL
GLUCOSE BLDC GLUCOMTR-MCNC: 194 MG/DL (ref 70–130)
GLUCOSE BLDC GLUCOMTR-MCNC: 222 MG/DL (ref 70–130)
GLUCOSE BLDC GLUCOMTR-MCNC: 234 MG/DL (ref 70–130)
GLUCOSE BLDC GLUCOMTR-MCNC: 248 MG/DL (ref 70–130)
GLUCOSE SERPL-MCNC: 201 MG/DL (ref 65–99)
HCT VFR BLD AUTO: 45 % (ref 37.5–51)
HGB BLD-MCNC: 14.9 G/DL (ref 13–17.7)
IMM GRANULOCYTES # BLD AUTO: 0.22 10*3/MM3 (ref 0–0.05)
IMM GRANULOCYTES NFR BLD AUTO: 1.2 % (ref 0–0.5)
LYMPHOCYTES # BLD AUTO: 0.52 10*3/MM3 (ref 0.7–3.1)
LYMPHOCYTES NFR BLD AUTO: 2.8 % (ref 19.6–45.3)
MCH RBC QN AUTO: 29.7 PG (ref 26.6–33)
MCHC RBC AUTO-ENTMCNC: 33.1 G/DL (ref 31.5–35.7)
MCV RBC AUTO: 89.6 FL (ref 79–97)
MONOCYTES # BLD AUTO: 0.82 10*3/MM3 (ref 0.1–0.9)
MONOCYTES NFR BLD AUTO: 4.4 % (ref 5–12)
NEUTROPHILS NFR BLD AUTO: 16.94 10*3/MM3 (ref 1.7–7)
NEUTROPHILS NFR BLD AUTO: 91.4 % (ref 42.7–76)
NRBC BLD AUTO-RTO: 0 /100 WBC (ref 0–0.2)
PLATELET # BLD AUTO: 151 10*3/MM3 (ref 140–450)
PMV BLD AUTO: 12.7 FL (ref 6–12)
POTASSIUM SERPL-SCNC: 4.4 MMOL/L (ref 3.5–5.2)
PROT SERPL-MCNC: 5.4 G/DL (ref 6–8.5)
RBC # BLD AUTO: 5.02 10*6/MM3 (ref 4.14–5.8)
SODIUM SERPL-SCNC: 146 MMOL/L (ref 136–145)
SODIUM UR-SCNC: <20 MMOL/L
WBC # BLD AUTO: 18.53 10*3/MM3 (ref 3.4–10.8)

## 2020-10-26 PROCEDURE — 84300 ASSAY OF URINE SODIUM: CPT | Performed by: INTERNAL MEDICINE

## 2020-10-26 PROCEDURE — 63710000001 INSULIN DETEMIR PER 5 UNITS: Performed by: EMERGENCY MEDICINE

## 2020-10-26 PROCEDURE — 25010000002 CEFTRIAXONE SODIUM-DEXTROSE 1-3.74 GM-%(50ML) RECONSTITUTED SOLUTION: Performed by: FAMILY MEDICINE

## 2020-10-26 PROCEDURE — 92610 EVALUATE SWALLOWING FUNCTION: CPT

## 2020-10-26 PROCEDURE — 94799 UNLISTED PULMONARY SVC/PX: CPT

## 2020-10-26 PROCEDURE — 82962 GLUCOSE BLOOD TEST: CPT

## 2020-10-26 PROCEDURE — 80053 COMPREHEN METABOLIC PANEL: CPT | Performed by: INTERNAL MEDICINE

## 2020-10-26 PROCEDURE — 25010000002 CEFTRIAXONE SODIUM-DEXTROSE 1-3.74 GM-%(50ML) RECONSTITUTED SOLUTION: Performed by: EMERGENCY MEDICINE

## 2020-10-26 PROCEDURE — 94660 CPAP INITIATION&MGMT: CPT

## 2020-10-26 PROCEDURE — 63710000001 INSULIN ASPART PER 5 UNITS: Performed by: EMERGENCY MEDICINE

## 2020-10-26 PROCEDURE — 25010000002 ENOXAPARIN PER 10 MG: Performed by: EMERGENCY MEDICINE

## 2020-10-26 PROCEDURE — 25010000002 DEXAMETHASONE PER 1 MG: Performed by: EMERGENCY MEDICINE

## 2020-10-26 PROCEDURE — 85025 COMPLETE CBC W/AUTO DIFF WBC: CPT | Performed by: INTERNAL MEDICINE

## 2020-10-26 PROCEDURE — 99233 SBSQ HOSP IP/OBS HIGH 50: CPT | Performed by: EMERGENCY MEDICINE

## 2020-10-26 RX ORDER — DEXAMETHASONE SODIUM PHOSPHATE 4 MG/ML
6 INJECTION, SOLUTION INTRA-ARTICULAR; INTRALESIONAL; INTRAMUSCULAR; INTRAVENOUS; SOFT TISSUE DAILY
Status: DISCONTINUED | OUTPATIENT
Start: 2020-10-27 | End: 2020-10-29

## 2020-10-26 RX ORDER — DEXTROSE MONOHYDRATE 50 MG/ML
75 INJECTION, SOLUTION INTRAVENOUS CONTINUOUS
Status: ACTIVE | OUTPATIENT
Start: 2020-10-26 | End: 2020-10-27

## 2020-10-26 RX ORDER — CEFTRIAXONE 1 G/50ML
1 INJECTION, SOLUTION INTRAVENOUS EVERY 24 HOURS
Status: COMPLETED | OUTPATIENT
Start: 2020-10-26 | End: 2020-10-30

## 2020-10-26 RX ADMIN — INSULIN ASPART 2 UNITS: 100 INJECTION, SOLUTION INTRAVENOUS; SUBCUTANEOUS at 06:47

## 2020-10-26 RX ADMIN — INSULIN DETEMIR 10 UNITS: 100 INJECTION, SOLUTION SUBCUTANEOUS at 20:55

## 2020-10-26 RX ADMIN — Medication 10 ML: at 08:56

## 2020-10-26 RX ADMIN — BUDESONIDE AND FORMOTEROL FUMARATE DIHYDRATE 2 PUFF: 160; 4.5 AEROSOL RESPIRATORY (INHALATION) at 09:05

## 2020-10-26 RX ADMIN — PANTOPRAZOLE SODIUM 40 MG: 40 TABLET, DELAYED RELEASE ORAL at 06:49

## 2020-10-26 RX ADMIN — INSULIN ASPART 3 UNITS: 100 INJECTION, SOLUTION INTRAVENOUS; SUBCUTANEOUS at 18:10

## 2020-10-26 RX ADMIN — DEXAMETHASONE SODIUM PHOSPHATE 6 MG: 4 INJECTION, SOLUTION INTRAMUSCULAR; INTRAVENOUS at 06:48

## 2020-10-26 RX ADMIN — ENOXAPARIN SODIUM 40 MG: 40 INJECTION SUBCUTANEOUS at 18:10

## 2020-10-26 RX ADMIN — DEXTROSE MONOHYDRATE 200 ML/HR: 50 INJECTION, SOLUTION INTRAVENOUS at 12:54

## 2020-10-26 RX ADMIN — INSULIN ASPART 3 UNITS: 100 INJECTION, SOLUTION INTRAVENOUS; SUBCUTANEOUS at 12:28

## 2020-10-26 RX ADMIN — DEXTROSE MONOHYDRATE 200 ML/HR: 50 INJECTION, SOLUTION INTRAVENOUS at 09:04

## 2020-10-26 RX ADMIN — SODIUM CHLORIDE, PRESERVATIVE FREE 10 ML: 5 INJECTION INTRAVENOUS at 20:49

## 2020-10-26 RX ADMIN — SODIUM CHLORIDE, PRESERVATIVE FREE 10 ML: 5 INJECTION INTRAVENOUS at 09:19

## 2020-10-26 RX ADMIN — CEFTRIAXONE 1 G: 1 INJECTION, SOLUTION INTRAVENOUS at 09:04

## 2020-10-26 RX ADMIN — BUDESONIDE AND FORMOTEROL FUMARATE DIHYDRATE 2 PUFF: 160; 4.5 AEROSOL RESPIRATORY (INHALATION) at 18:49

## 2020-10-26 RX ADMIN — ASPIRIN 81 MG CHEWABLE TABLET 81 MG: 81 TABLET CHEWABLE at 09:04

## 2020-10-27 LAB
ALBUMIN SERPL-MCNC: 2.8 G/DL (ref 3.5–5.2)
ANION GAP SERPL CALCULATED.3IONS-SCNC: 9.1 MMOL/L (ref 5–15)
BUN SERPL-MCNC: 60 MG/DL (ref 8–23)
BUN/CREAT SERPL: 39.2 (ref 7–25)
CALCIUM SPEC-SCNC: 7.8 MG/DL (ref 8.6–10.5)
CHLORIDE SERPL-SCNC: 109 MMOL/L (ref 98–107)
CO2 SERPL-SCNC: 23.9 MMOL/L (ref 22–29)
CREAT SERPL-MCNC: 1.53 MG/DL (ref 0.76–1.27)
DEPRECATED RDW RBC AUTO: 45.8 FL (ref 37–54)
ERYTHROCYTE [DISTWIDTH] IN BLOOD BY AUTOMATED COUNT: 13.6 % (ref 12.3–15.4)
GFR SERPL CREATININE-BSD FRML MDRD: 44 ML/MIN/1.73
GLUCOSE BLDC GLUCOMTR-MCNC: 137 MG/DL (ref 70–130)
GLUCOSE BLDC GLUCOMTR-MCNC: 154 MG/DL (ref 70–130)
GLUCOSE BLDC GLUCOMTR-MCNC: 160 MG/DL (ref 70–130)
GLUCOSE SERPL-MCNC: 161 MG/DL (ref 65–99)
HCT VFR BLD AUTO: 44.2 % (ref 37.5–51)
HGB BLD-MCNC: 14.4 G/DL (ref 13–17.7)
MCH RBC QN AUTO: 29.4 PG (ref 26.6–33)
MCHC RBC AUTO-ENTMCNC: 32.6 G/DL (ref 31.5–35.7)
MCV RBC AUTO: 90.4 FL (ref 79–97)
PHOSPHATE SERPL-MCNC: 2.9 MG/DL (ref 2.5–4.5)
PLATELET # BLD AUTO: 208 10*3/MM3 (ref 140–450)
PMV BLD AUTO: 12.7 FL (ref 6–12)
POTASSIUM SERPL-SCNC: 4.3 MMOL/L (ref 3.5–5.2)
QT INTERVAL: 408 MS
QTC INTERVAL: 473 MS
RBC # BLD AUTO: 4.89 10*6/MM3 (ref 4.14–5.8)
SODIUM SERPL-SCNC: 142 MMOL/L (ref 136–145)
TROPONIN T SERPL-MCNC: <0.01 NG/ML (ref 0–0.03)
WBC # BLD AUTO: 14.7 10*3/MM3 (ref 3.4–10.8)

## 2020-10-27 PROCEDURE — 25010000002 CEFTRIAXONE SODIUM-DEXTROSE 1-3.74 GM-%(50ML) RECONSTITUTED SOLUTION: Performed by: EMERGENCY MEDICINE

## 2020-10-27 PROCEDURE — 63710000001 INSULIN ASPART PER 5 UNITS: Performed by: EMERGENCY MEDICINE

## 2020-10-27 PROCEDURE — 25010000002 LEVOFLOXACIN PER 250 MG: Performed by: EMERGENCY MEDICINE

## 2020-10-27 PROCEDURE — 85027 COMPLETE CBC AUTOMATED: CPT | Performed by: INTERNAL MEDICINE

## 2020-10-27 PROCEDURE — 25010000002 DEXAMETHASONE PER 1 MG: Performed by: EMERGENCY MEDICINE

## 2020-10-27 PROCEDURE — 94660 CPAP INITIATION&MGMT: CPT

## 2020-10-27 PROCEDURE — 63710000001 INSULIN DETEMIR PER 5 UNITS: Performed by: EMERGENCY MEDICINE

## 2020-10-27 PROCEDURE — 93005 ELECTROCARDIOGRAM TRACING: CPT | Performed by: FAMILY MEDICINE

## 2020-10-27 PROCEDURE — 82962 GLUCOSE BLOOD TEST: CPT

## 2020-10-27 PROCEDURE — 25010000002 CEFTRIAXONE SODIUM-DEXTROSE 1-3.74 GM-%(50ML) RECONSTITUTED SOLUTION: Performed by: FAMILY MEDICINE

## 2020-10-27 PROCEDURE — XW033E5 INTRODUCTION OF REMDESIVIR ANTI-INFECTIVE INTO PERIPHERAL VEIN, PERCUTANEOUS APPROACH, NEW TECHNOLOGY GROUP 5: ICD-10-PCS | Performed by: FAMILY MEDICINE

## 2020-10-27 PROCEDURE — 80069 RENAL FUNCTION PANEL: CPT | Performed by: INTERNAL MEDICINE

## 2020-10-27 PROCEDURE — 94799 UNLISTED PULMONARY SVC/PX: CPT

## 2020-10-27 PROCEDURE — 84484 ASSAY OF TROPONIN QUANT: CPT | Performed by: FAMILY MEDICINE

## 2020-10-27 PROCEDURE — 99233 SBSQ HOSP IP/OBS HIGH 50: CPT | Performed by: FAMILY MEDICINE

## 2020-10-27 PROCEDURE — 25010000002 ENOXAPARIN PER 10 MG: Performed by: EMERGENCY MEDICINE

## 2020-10-27 RX ORDER — DEXTROSE MONOHYDRATE 50 MG/ML
75 INJECTION, SOLUTION INTRAVENOUS CONTINUOUS
Status: DISCONTINUED | OUTPATIENT
Start: 2020-10-27 | End: 2020-10-29

## 2020-10-27 RX ORDER — AMINO ACIDS/PROTEIN HYDROLYS 15G-100/30
30 LIQUID (ML) ORAL 2 TIMES DAILY
Status: DISCONTINUED | OUTPATIENT
Start: 2020-10-27 | End: 2020-11-12 | Stop reason: HOSPADM

## 2020-10-27 RX ADMIN — BUDESONIDE AND FORMOTEROL FUMARATE DIHYDRATE 2 PUFF: 160; 4.5 AEROSOL RESPIRATORY (INHALATION) at 08:43

## 2020-10-27 RX ADMIN — INSULIN ASPART 2 UNITS: 100 INJECTION, SOLUTION INTRAVENOUS; SUBCUTANEOUS at 17:37

## 2020-10-27 RX ADMIN — ENOXAPARIN SODIUM 40 MG: 40 INJECTION SUBCUTANEOUS at 17:37

## 2020-10-27 RX ADMIN — SODIUM CHLORIDE, PRESERVATIVE FREE 10 ML: 5 INJECTION INTRAVENOUS at 21:11

## 2020-10-27 RX ADMIN — DEXAMETHASONE SODIUM PHOSPHATE 6 MG: 4 INJECTION, SOLUTION INTRAMUSCULAR; INTRAVENOUS at 08:42

## 2020-10-27 RX ADMIN — DEXTROSE MONOHYDRATE 75 ML/HR: 50 INJECTION, SOLUTION INTRAVENOUS at 09:53

## 2020-10-27 RX ADMIN — INSULIN ASPART 3 UNITS: 100 INJECTION, SOLUTION INTRAVENOUS; SUBCUTANEOUS at 12:30

## 2020-10-27 RX ADMIN — ASPIRIN 81 MG CHEWABLE TABLET 81 MG: 81 TABLET CHEWABLE at 08:43

## 2020-10-27 RX ADMIN — Medication 30 ML: at 21:10

## 2020-10-27 RX ADMIN — REMDESIVIR 100 MG: 100 INJECTION, POWDER, LYOPHILIZED, FOR SOLUTION INTRAVENOUS at 18:16

## 2020-10-27 RX ADMIN — CEFTRIAXONE 1 G: 1 INJECTION, SOLUTION INTRAVENOUS at 08:43

## 2020-10-27 RX ADMIN — LEVOFLOXACIN 750 MG: 5 INJECTION, SOLUTION INTRAVENOUS at 21:10

## 2020-10-27 RX ADMIN — BUDESONIDE AND FORMOTEROL FUMARATE DIHYDRATE 2 PUFF: 160; 4.5 AEROSOL RESPIRATORY (INHALATION) at 21:09

## 2020-10-27 RX ADMIN — INSULIN ASPART 2 UNITS: 100 INJECTION, SOLUTION INTRAVENOUS; SUBCUTANEOUS at 06:34

## 2020-10-27 RX ADMIN — TERAZOSIN HYDROCHLORIDE 5 MG: 5 CAPSULE ORAL at 21:11

## 2020-10-27 RX ADMIN — DEXTROSE MONOHYDRATE 75 ML/HR: 50 INJECTION, SOLUTION INTRAVENOUS at 02:57

## 2020-10-27 RX ADMIN — INSULIN DETEMIR 10 UNITS: 100 INJECTION, SOLUTION SUBCUTANEOUS at 21:11

## 2020-10-27 RX ADMIN — SODIUM CHLORIDE, PRESERVATIVE FREE 10 ML: 5 INJECTION INTRAVENOUS at 08:43

## 2020-10-27 RX ADMIN — MONTELUKAST 10 MG: 10 TABLET, FILM COATED ORAL at 21:11

## 2020-10-28 ENCOUNTER — APPOINTMENT (OUTPATIENT)
Dept: GENERAL RADIOLOGY | Facility: HOSPITAL | Age: 85
End: 2020-10-28

## 2020-10-28 ENCOUNTER — APPOINTMENT (OUTPATIENT)
Dept: CT IMAGING | Facility: HOSPITAL | Age: 85
End: 2020-10-28

## 2020-10-28 LAB
ALBUMIN SERPL-MCNC: 2.5 G/DL (ref 3.5–5.2)
ALP SERPL-CCNC: 41 U/L (ref 39–117)
ALT SERPL W P-5'-P-CCNC: 31 U/L (ref 1–41)
ANION GAP SERPL CALCULATED.3IONS-SCNC: 7.6 MMOL/L (ref 5–15)
AST SERPL-CCNC: 25 U/L (ref 1–40)
BACTERIA SPEC AEROBE CULT: NORMAL
BILIRUB CONJ SERPL-MCNC: 0.3 MG/DL (ref 0–0.3)
BILIRUB INDIRECT SERPL-MCNC: 0.3 MG/DL
BILIRUB SERPL-MCNC: 0.6 MG/DL (ref 0–1.2)
BUN SERPL-MCNC: 50 MG/DL (ref 8–23)
BUN/CREAT SERPL: 36.8 (ref 7–25)
CALCIUM SPEC-SCNC: 7.5 MG/DL (ref 8.6–10.5)
CHLORIDE SERPL-SCNC: 111 MMOL/L (ref 98–107)
CO2 SERPL-SCNC: 24.4 MMOL/L (ref 22–29)
CREAT SERPL-MCNC: 1.36 MG/DL (ref 0.76–1.27)
DEPRECATED RDW RBC AUTO: 45.1 FL (ref 37–54)
ERYTHROCYTE [DISTWIDTH] IN BLOOD BY AUTOMATED COUNT: 13.4 % (ref 12.3–15.4)
GFR SERPL CREATININE-BSD FRML MDRD: 50 ML/MIN/1.73
GLUCOSE BLDC GLUCOMTR-MCNC: 107 MG/DL (ref 70–130)
GLUCOSE BLDC GLUCOMTR-MCNC: 145 MG/DL (ref 70–130)
GLUCOSE BLDC GLUCOMTR-MCNC: 148 MG/DL (ref 70–130)
GLUCOSE BLDC GLUCOMTR-MCNC: 161 MG/DL (ref 70–130)
GLUCOSE SERPL-MCNC: 123 MG/DL (ref 65–99)
HCT VFR BLD AUTO: 41.8 % (ref 37.5–51)
HGB BLD-MCNC: 13.4 G/DL (ref 13–17.7)
MCH RBC QN AUTO: 29.1 PG (ref 26.6–33)
MCHC RBC AUTO-ENTMCNC: 32.1 G/DL (ref 31.5–35.7)
MCV RBC AUTO: 90.9 FL (ref 79–97)
PHOSPHATE SERPL-MCNC: 2.8 MG/DL (ref 2.5–4.5)
PLATELET # BLD AUTO: 185 10*3/MM3 (ref 140–450)
PMV BLD AUTO: 12.7 FL (ref 6–12)
POTASSIUM SERPL-SCNC: 4.4 MMOL/L (ref 3.5–5.2)
PROCALCITONIN SERPL-MCNC: 0.21 NG/ML (ref 0–0.25)
PROT SERPL-MCNC: 5.1 G/DL (ref 6–8.5)
RBC # BLD AUTO: 4.6 10*6/MM3 (ref 4.14–5.8)
SODIUM SERPL-SCNC: 143 MMOL/L (ref 136–145)
WBC # BLD AUTO: 12.26 10*3/MM3 (ref 3.4–10.8)

## 2020-10-28 PROCEDURE — 82962 GLUCOSE BLOOD TEST: CPT

## 2020-10-28 PROCEDURE — 80048 BASIC METABOLIC PNL TOTAL CA: CPT | Performed by: INTERNAL MEDICINE

## 2020-10-28 PROCEDURE — 71045 X-RAY EXAM CHEST 1 VIEW: CPT

## 2020-10-28 PROCEDURE — 85027 COMPLETE CBC AUTOMATED: CPT | Performed by: INTERNAL MEDICINE

## 2020-10-28 PROCEDURE — 25010000002 ENOXAPARIN PER 10 MG: Performed by: EMERGENCY MEDICINE

## 2020-10-28 PROCEDURE — 84145 PROCALCITONIN (PCT): CPT | Performed by: FAMILY MEDICINE

## 2020-10-28 PROCEDURE — 25010000002 CEFTRIAXONE SODIUM-DEXTROSE 1-3.74 GM-%(50ML) RECONSTITUTED SOLUTION: Performed by: FAMILY MEDICINE

## 2020-10-28 PROCEDURE — 63710000001 INSULIN DETEMIR PER 5 UNITS: Performed by: EMERGENCY MEDICINE

## 2020-10-28 PROCEDURE — 84100 ASSAY OF PHOSPHORUS: CPT | Performed by: INTERNAL MEDICINE

## 2020-10-28 PROCEDURE — 70450 CT HEAD/BRAIN W/O DYE: CPT

## 2020-10-28 PROCEDURE — 25010000002 DEXAMETHASONE PER 1 MG: Performed by: EMERGENCY MEDICINE

## 2020-10-28 PROCEDURE — 99232 SBSQ HOSP IP/OBS MODERATE 35: CPT | Performed by: FAMILY MEDICINE

## 2020-10-28 PROCEDURE — 63710000001 INSULIN ASPART PER 5 UNITS: Performed by: EMERGENCY MEDICINE

## 2020-10-28 PROCEDURE — 97162 PT EVAL MOD COMPLEX 30 MIN: CPT

## 2020-10-28 PROCEDURE — 94660 CPAP INITIATION&MGMT: CPT

## 2020-10-28 PROCEDURE — 80076 HEPATIC FUNCTION PANEL: CPT | Performed by: FAMILY MEDICINE

## 2020-10-28 RX ORDER — AMINO ACIDS/PROTEIN HYDROLYS 15G-100/30
30 LIQUID (ML) ORAL 2 TIMES DAILY
Status: DISCONTINUED | OUTPATIENT
Start: 2020-10-28 | End: 2020-10-28

## 2020-10-28 RX ADMIN — INSULIN DETEMIR 10 UNITS: 100 INJECTION, SOLUTION SUBCUTANEOUS at 20:34

## 2020-10-28 RX ADMIN — DEXTROSE MONOHYDRATE 75 ML/HR: 50 INJECTION, SOLUTION INTRAVENOUS at 14:30

## 2020-10-28 RX ADMIN — BUDESONIDE AND FORMOTEROL FUMARATE DIHYDRATE 2 PUFF: 160; 4.5 AEROSOL RESPIRATORY (INHALATION) at 09:20

## 2020-10-28 RX ADMIN — TERAZOSIN HYDROCHLORIDE 5 MG: 5 CAPSULE ORAL at 20:33

## 2020-10-28 RX ADMIN — Medication 30 ML: at 09:20

## 2020-10-28 RX ADMIN — PANTOPRAZOLE SODIUM 40 MG: 40 TABLET, DELAYED RELEASE ORAL at 06:44

## 2020-10-28 RX ADMIN — CEFTRIAXONE 1 G: 1 INJECTION, SOLUTION INTRAVENOUS at 07:40

## 2020-10-28 RX ADMIN — MONTELUKAST 10 MG: 10 TABLET, FILM COATED ORAL at 20:33

## 2020-10-28 RX ADMIN — BUDESONIDE AND FORMOTEROL FUMARATE DIHYDRATE 2 PUFF: 160; 4.5 AEROSOL RESPIRATORY (INHALATION) at 20:33

## 2020-10-28 RX ADMIN — ENOXAPARIN SODIUM 40 MG: 40 INJECTION SUBCUTANEOUS at 17:45

## 2020-10-28 RX ADMIN — INSULIN ASPART 2 UNITS: 100 INJECTION, SOLUTION INTRAVENOUS; SUBCUTANEOUS at 11:50

## 2020-10-28 RX ADMIN — DEXAMETHASONE SODIUM PHOSPHATE 6 MG: 4 INJECTION, SOLUTION INTRAMUSCULAR; INTRAVENOUS at 09:20

## 2020-10-28 RX ADMIN — ASPIRIN 81 MG CHEWABLE TABLET 81 MG: 81 TABLET CHEWABLE at 09:20

## 2020-10-28 RX ADMIN — REMDESIVIR 100 MG: 100 INJECTION, POWDER, LYOPHILIZED, FOR SOLUTION INTRAVENOUS at 17:45

## 2020-10-28 RX ADMIN — SODIUM CHLORIDE, PRESERVATIVE FREE 10 ML: 5 INJECTION INTRAVENOUS at 09:20

## 2020-10-29 LAB
ALBUMIN SERPL-MCNC: 2.5 G/DL (ref 3.5–5.2)
ALP SERPL-CCNC: 37 U/L (ref 39–117)
ALT SERPL W P-5'-P-CCNC: 28 U/L (ref 1–41)
ANION GAP SERPL CALCULATED.3IONS-SCNC: 5.6 MMOL/L (ref 5–15)
AST SERPL-CCNC: 23 U/L (ref 1–40)
BILIRUB CONJ SERPL-MCNC: 0.2 MG/DL (ref 0–0.3)
BILIRUB INDIRECT SERPL-MCNC: 0.3 MG/DL
BILIRUB SERPL-MCNC: 0.5 MG/DL (ref 0–1.2)
BUN SERPL-MCNC: 37 MG/DL (ref 8–23)
BUN/CREAT SERPL: 35.9 (ref 7–25)
CALCIUM SPEC-SCNC: 7.2 MG/DL (ref 8.6–10.5)
CHLORIDE SERPL-SCNC: 109 MMOL/L (ref 98–107)
CO2 SERPL-SCNC: 23.4 MMOL/L (ref 22–29)
CREAT SERPL-MCNC: 1.03 MG/DL (ref 0.76–1.27)
DEPRECATED RDW RBC AUTO: 43.3 FL (ref 37–54)
ERYTHROCYTE [DISTWIDTH] IN BLOOD BY AUTOMATED COUNT: 13.2 % (ref 12.3–15.4)
GFR SERPL CREATININE-BSD FRML MDRD: 69 ML/MIN/1.73
GLUCOSE BLDC GLUCOMTR-MCNC: 101 MG/DL (ref 70–130)
GLUCOSE BLDC GLUCOMTR-MCNC: 102 MG/DL (ref 70–130)
GLUCOSE BLDC GLUCOMTR-MCNC: 121 MG/DL (ref 70–130)
GLUCOSE BLDC GLUCOMTR-MCNC: 135 MG/DL (ref 70–130)
GLUCOSE SERPL-MCNC: 94 MG/DL (ref 65–99)
HCT VFR BLD AUTO: 36.8 % (ref 37.5–51)
HGB BLD-MCNC: 12.1 G/DL (ref 13–17.7)
MCH RBC QN AUTO: 29.4 PG (ref 26.6–33)
MCHC RBC AUTO-ENTMCNC: 32.9 G/DL (ref 31.5–35.7)
MCV RBC AUTO: 89.5 FL (ref 79–97)
PHOSPHATE SERPL-MCNC: 2.7 MG/DL (ref 2.5–4.5)
PLATELET # BLD AUTO: 169 10*3/MM3 (ref 140–450)
PMV BLD AUTO: 13 FL (ref 6–12)
POTASSIUM SERPL-SCNC: 4.2 MMOL/L (ref 3.5–5.2)
PROT SERPL-MCNC: 4.6 G/DL (ref 6–8.5)
RBC # BLD AUTO: 4.11 10*6/MM3 (ref 4.14–5.8)
SODIUM SERPL-SCNC: 138 MMOL/L (ref 136–145)
WBC # BLD AUTO: 10.19 10*3/MM3 (ref 3.4–10.8)

## 2020-10-29 PROCEDURE — 82962 GLUCOSE BLOOD TEST: CPT

## 2020-10-29 PROCEDURE — 94799 UNLISTED PULMONARY SVC/PX: CPT

## 2020-10-29 PROCEDURE — 97530 THERAPEUTIC ACTIVITIES: CPT

## 2020-10-29 PROCEDURE — 25010000002 DEXAMETHASONE PER 1 MG: Performed by: EMERGENCY MEDICINE

## 2020-10-29 PROCEDURE — 85027 COMPLETE CBC AUTOMATED: CPT | Performed by: INTERNAL MEDICINE

## 2020-10-29 PROCEDURE — 80076 HEPATIC FUNCTION PANEL: CPT | Performed by: FAMILY MEDICINE

## 2020-10-29 PROCEDURE — 94660 CPAP INITIATION&MGMT: CPT

## 2020-10-29 PROCEDURE — 25010000002 CEFTRIAXONE SODIUM-DEXTROSE 1-3.74 GM-%(50ML) RECONSTITUTED SOLUTION: Performed by: FAMILY MEDICINE

## 2020-10-29 PROCEDURE — 97110 THERAPEUTIC EXERCISES: CPT

## 2020-10-29 PROCEDURE — 25010000002 ENOXAPARIN PER 10 MG: Performed by: EMERGENCY MEDICINE

## 2020-10-29 PROCEDURE — 84100 ASSAY OF PHOSPHORUS: CPT | Performed by: INTERNAL MEDICINE

## 2020-10-29 PROCEDURE — 99232 SBSQ HOSP IP/OBS MODERATE 35: CPT | Performed by: FAMILY MEDICINE

## 2020-10-29 PROCEDURE — 80048 BASIC METABOLIC PNL TOTAL CA: CPT | Performed by: INTERNAL MEDICINE

## 2020-10-29 PROCEDURE — 25010000002 LEVOFLOXACIN PER 250 MG: Performed by: EMERGENCY MEDICINE

## 2020-10-29 RX ORDER — LEVOFLOXACIN 5 MG/ML
750 INJECTION, SOLUTION INTRAVENOUS
Status: COMPLETED | OUTPATIENT
Start: 2020-10-29 | End: 2020-10-30

## 2020-10-29 RX ORDER — LEVOFLOXACIN 5 MG/ML
750 INJECTION, SOLUTION INTRAVENOUS
Status: DISCONTINUED | OUTPATIENT
Start: 2020-10-29 | End: 2020-10-29

## 2020-10-29 RX ORDER — DEXTROSE AND SODIUM CHLORIDE 5; .45 G/100ML; G/100ML
75 INJECTION, SOLUTION INTRAVENOUS CONTINUOUS
Status: DISCONTINUED | OUTPATIENT
Start: 2020-10-29 | End: 2020-10-31

## 2020-10-29 RX ADMIN — DEXAMETHASONE SODIUM PHOSPHATE 6 MG: 4 INJECTION, SOLUTION INTRAMUSCULAR; INTRAVENOUS at 09:06

## 2020-10-29 RX ADMIN — ASPIRIN 81 MG CHEWABLE TABLET 81 MG: 81 TABLET CHEWABLE at 09:06

## 2020-10-29 RX ADMIN — TERAZOSIN HYDROCHLORIDE 5 MG: 5 CAPSULE ORAL at 20:37

## 2020-10-29 RX ADMIN — DEXTROSE AND SODIUM CHLORIDE 75 ML/HR: 5; 450 INJECTION, SOLUTION INTRAVENOUS at 12:38

## 2020-10-29 RX ADMIN — ENOXAPARIN SODIUM 40 MG: 40 INJECTION SUBCUTANEOUS at 18:13

## 2020-10-29 RX ADMIN — LEVOFLOXACIN 750 MG: 5 INJECTION, SOLUTION INTRAVENOUS at 12:38

## 2020-10-29 RX ADMIN — MONTELUKAST 10 MG: 10 TABLET, FILM COATED ORAL at 20:37

## 2020-10-29 RX ADMIN — REMDESIVIR 100 MG: 100 INJECTION, POWDER, LYOPHILIZED, FOR SOLUTION INTRAVENOUS at 18:13

## 2020-10-29 RX ADMIN — BUDESONIDE AND FORMOTEROL FUMARATE DIHYDRATE 2 PUFF: 160; 4.5 AEROSOL RESPIRATORY (INHALATION) at 09:05

## 2020-10-29 RX ADMIN — Medication 30 ML: at 09:06

## 2020-10-29 RX ADMIN — PANTOPRAZOLE SODIUM 40 MG: 40 TABLET, DELAYED RELEASE ORAL at 06:01

## 2020-10-29 RX ADMIN — CEFTRIAXONE 1 G: 1 INJECTION, SOLUTION INTRAVENOUS at 09:05

## 2020-10-29 RX ADMIN — SODIUM CHLORIDE, PRESERVATIVE FREE 10 ML: 5 INJECTION INTRAVENOUS at 09:06

## 2020-10-29 RX ADMIN — SODIUM CHLORIDE, PRESERVATIVE FREE 10 ML: 5 INJECTION INTRAVENOUS at 20:39

## 2020-10-29 RX ADMIN — Medication 30 ML: at 20:37

## 2020-10-29 RX ADMIN — BUDESONIDE AND FORMOTEROL FUMARATE DIHYDRATE 2 PUFF: 160; 4.5 AEROSOL RESPIRATORY (INHALATION) at 20:37

## 2020-10-30 LAB
ALBUMIN SERPL-MCNC: 2.4 G/DL (ref 3.5–5.2)
ALP SERPL-CCNC: 47 U/L (ref 39–117)
ALT SERPL W P-5'-P-CCNC: 31 U/L (ref 1–41)
ANION GAP SERPL CALCULATED.3IONS-SCNC: 9.7 MMOL/L (ref 5–15)
AST SERPL-CCNC: 32 U/L (ref 1–40)
BILIRUB CONJ SERPL-MCNC: <0.2 MG/DL (ref 0–0.3)
BILIRUB INDIRECT SERPL-MCNC: ABNORMAL MG/DL
BILIRUB SERPL-MCNC: 0.6 MG/DL (ref 0–1.2)
BUN SERPL-MCNC: 31 MG/DL (ref 8–23)
BUN/CREAT SERPL: 28.4 (ref 7–25)
CALCIUM SPEC-SCNC: 7.2 MG/DL (ref 8.6–10.5)
CHLORIDE SERPL-SCNC: 105 MMOL/L (ref 98–107)
CO2 SERPL-SCNC: 22.3 MMOL/L (ref 22–29)
CREAT SERPL-MCNC: 1.09 MG/DL (ref 0.76–1.27)
DEPRECATED RDW RBC AUTO: 42.6 FL (ref 37–54)
ERYTHROCYTE [DISTWIDTH] IN BLOOD BY AUTOMATED COUNT: 13 % (ref 12.3–15.4)
GFR SERPL CREATININE-BSD FRML MDRD: 64 ML/MIN/1.73
GLUCOSE BLDC GLUCOMTR-MCNC: 81 MG/DL (ref 70–130)
GLUCOSE BLDC GLUCOMTR-MCNC: 88 MG/DL (ref 70–130)
GLUCOSE BLDC GLUCOMTR-MCNC: 88 MG/DL (ref 70–130)
GLUCOSE SERPL-MCNC: 91 MG/DL (ref 65–99)
HCT VFR BLD AUTO: 40.7 % (ref 37.5–51)
HGB BLD-MCNC: 13.3 G/DL (ref 13–17.7)
MCH RBC QN AUTO: 29 PG (ref 26.6–33)
MCHC RBC AUTO-ENTMCNC: 32.7 G/DL (ref 31.5–35.7)
MCV RBC AUTO: 88.9 FL (ref 79–97)
PHOSPHATE SERPL-MCNC: 2.5 MG/DL (ref 2.5–4.5)
PLATELET # BLD AUTO: 169 10*3/MM3 (ref 140–450)
PMV BLD AUTO: 12.8 FL (ref 6–12)
POTASSIUM SERPL-SCNC: 4.4 MMOL/L (ref 3.5–5.2)
PROT SERPL-MCNC: 5 G/DL (ref 6–8.5)
RBC # BLD AUTO: 4.58 10*6/MM3 (ref 4.14–5.8)
SODIUM SERPL-SCNC: 137 MMOL/L (ref 136–145)
WBC # BLD AUTO: 9.78 10*3/MM3 (ref 3.4–10.8)

## 2020-10-30 PROCEDURE — 25010000002 CEFTRIAXONE SODIUM-DEXTROSE 1-3.74 GM-%(50ML) RECONSTITUTED SOLUTION: Performed by: FAMILY MEDICINE

## 2020-10-30 PROCEDURE — 25010000002 ENOXAPARIN PER 10 MG: Performed by: EMERGENCY MEDICINE

## 2020-10-30 PROCEDURE — 99232 SBSQ HOSP IP/OBS MODERATE 35: CPT | Performed by: FAMILY MEDICINE

## 2020-10-30 PROCEDURE — 82962 GLUCOSE BLOOD TEST: CPT

## 2020-10-30 PROCEDURE — 85027 COMPLETE CBC AUTOMATED: CPT | Performed by: INTERNAL MEDICINE

## 2020-10-30 PROCEDURE — 25010000002 LEVOFLOXACIN PER 250 MG: Performed by: EMERGENCY MEDICINE

## 2020-10-30 PROCEDURE — 84100 ASSAY OF PHOSPHORUS: CPT | Performed by: INTERNAL MEDICINE

## 2020-10-30 PROCEDURE — 80048 BASIC METABOLIC PNL TOTAL CA: CPT | Performed by: INTERNAL MEDICINE

## 2020-10-30 PROCEDURE — 80076 HEPATIC FUNCTION PANEL: CPT | Performed by: FAMILY MEDICINE

## 2020-10-30 RX ORDER — CYANOCOBALAMIN 1000 UG/ML
1000 INJECTION, SOLUTION INTRAMUSCULAR; SUBCUTANEOUS ONCE
COMMUNITY

## 2020-10-30 RX ADMIN — ENOXAPARIN SODIUM 40 MG: 40 INJECTION SUBCUTANEOUS at 17:49

## 2020-10-30 RX ADMIN — BUDESONIDE AND FORMOTEROL FUMARATE DIHYDRATE 2 PUFF: 160; 4.5 AEROSOL RESPIRATORY (INHALATION) at 09:02

## 2020-10-30 RX ADMIN — LEVOFLOXACIN 750 MG: 5 INJECTION, SOLUTION INTRAVENOUS at 09:03

## 2020-10-30 RX ADMIN — Medication 30 ML: at 09:02

## 2020-10-30 RX ADMIN — ASPIRIN 81 MG CHEWABLE TABLET 81 MG: 81 TABLET CHEWABLE at 09:02

## 2020-10-30 RX ADMIN — REMDESIVIR 100 MG: 100 INJECTION, POWDER, LYOPHILIZED, FOR SOLUTION INTRAVENOUS at 17:07

## 2020-10-30 RX ADMIN — CEFTRIAXONE 1 G: 1 INJECTION, SOLUTION INTRAVENOUS at 09:02

## 2020-10-30 RX ADMIN — MONTELUKAST 10 MG: 10 TABLET, FILM COATED ORAL at 20:13

## 2020-10-30 RX ADMIN — PANTOPRAZOLE SODIUM 40 MG: 40 TABLET, DELAYED RELEASE ORAL at 06:40

## 2020-10-30 RX ADMIN — TERAZOSIN HYDROCHLORIDE 5 MG: 5 CAPSULE ORAL at 20:12

## 2020-10-31 LAB
ALBUMIN SERPL-MCNC: 2.6 G/DL (ref 3.5–5.2)
ALP SERPL-CCNC: 46 U/L (ref 39–117)
ALT SERPL W P-5'-P-CCNC: 36 U/L (ref 1–41)
ANION GAP SERPL CALCULATED.3IONS-SCNC: 8.8 MMOL/L (ref 5–15)
AST SERPL-CCNC: 33 U/L (ref 1–40)
BILIRUB CONJ SERPL-MCNC: 0.2 MG/DL (ref 0–0.3)
BILIRUB INDIRECT SERPL-MCNC: 0.5 MG/DL
BILIRUB SERPL-MCNC: 0.7 MG/DL (ref 0–1.2)
BUN SERPL-MCNC: 24 MG/DL (ref 8–23)
BUN/CREAT SERPL: 22.6 (ref 7–25)
CALCIUM SPEC-SCNC: 7.4 MG/DL (ref 8.6–10.5)
CHLORIDE SERPL-SCNC: 106 MMOL/L (ref 98–107)
CO2 SERPL-SCNC: 23.2 MMOL/L (ref 22–29)
CREAT SERPL-MCNC: 1.06 MG/DL (ref 0.76–1.27)
DEPRECATED RDW RBC AUTO: 41.9 FL (ref 37–54)
ERYTHROCYTE [DISTWIDTH] IN BLOOD BY AUTOMATED COUNT: 13 % (ref 12.3–15.4)
GFR SERPL CREATININE-BSD FRML MDRD: 67 ML/MIN/1.73
GLUCOSE BLDC GLUCOMTR-MCNC: 100 MG/DL (ref 70–130)
GLUCOSE BLDC GLUCOMTR-MCNC: 101 MG/DL (ref 70–130)
GLUCOSE BLDC GLUCOMTR-MCNC: 94 MG/DL (ref 70–130)
GLUCOSE SERPL-MCNC: 94 MG/DL (ref 65–99)
HCT VFR BLD AUTO: 40.6 % (ref 37.5–51)
HGB BLD-MCNC: 13.7 G/DL (ref 13–17.7)
MCH RBC QN AUTO: 29.7 PG (ref 26.6–33)
MCHC RBC AUTO-ENTMCNC: 33.7 G/DL (ref 31.5–35.7)
MCV RBC AUTO: 88.1 FL (ref 79–97)
PHOSPHATE SERPL-MCNC: 2.6 MG/DL (ref 2.5–4.5)
PLATELET # BLD AUTO: 187 10*3/MM3 (ref 140–450)
PMV BLD AUTO: 12.5 FL (ref 6–12)
POTASSIUM SERPL-SCNC: 4.3 MMOL/L (ref 3.5–5.2)
PROT SERPL-MCNC: 4.8 G/DL (ref 6–8.5)
RBC # BLD AUTO: 4.61 10*6/MM3 (ref 4.14–5.8)
SODIUM SERPL-SCNC: 138 MMOL/L (ref 136–145)
WBC # BLD AUTO: 9.6 10*3/MM3 (ref 3.4–10.8)

## 2020-10-31 PROCEDURE — 80076 HEPATIC FUNCTION PANEL: CPT | Performed by: FAMILY MEDICINE

## 2020-10-31 PROCEDURE — 82962 GLUCOSE BLOOD TEST: CPT

## 2020-10-31 PROCEDURE — 97110 THERAPEUTIC EXERCISES: CPT

## 2020-10-31 PROCEDURE — 85027 COMPLETE CBC AUTOMATED: CPT | Performed by: INTERNAL MEDICINE

## 2020-10-31 PROCEDURE — 80048 BASIC METABOLIC PNL TOTAL CA: CPT | Performed by: INTERNAL MEDICINE

## 2020-10-31 PROCEDURE — 84100 ASSAY OF PHOSPHORUS: CPT | Performed by: INTERNAL MEDICINE

## 2020-10-31 PROCEDURE — 97112 NEUROMUSCULAR REEDUCATION: CPT

## 2020-10-31 PROCEDURE — 99232 SBSQ HOSP IP/OBS MODERATE 35: CPT | Performed by: FAMILY MEDICINE

## 2020-10-31 PROCEDURE — 25010000002 ENOXAPARIN PER 10 MG: Performed by: EMERGENCY MEDICINE

## 2020-10-31 RX ORDER — MIRTAZAPINE 15 MG/1
15 TABLET, FILM COATED ORAL NIGHTLY
Status: DISCONTINUED | OUTPATIENT
Start: 2020-10-31 | End: 2020-11-04

## 2020-10-31 RX ADMIN — TERAZOSIN HYDROCHLORIDE 5 MG: 5 CAPSULE ORAL at 20:14

## 2020-10-31 RX ADMIN — MIRTAZAPINE 15 MG: 15 TABLET, FILM COATED ORAL at 20:15

## 2020-10-31 RX ADMIN — NYSTATIN 500000 UNITS: 500000 SUSPENSION ORAL at 20:14

## 2020-10-31 RX ADMIN — NYSTATIN 500000 UNITS: 500000 SUSPENSION ORAL at 09:33

## 2020-10-31 RX ADMIN — SODIUM CHLORIDE, PRESERVATIVE FREE 10 ML: 5 INJECTION INTRAVENOUS at 20:15

## 2020-10-31 RX ADMIN — PANTOPRAZOLE SODIUM 40 MG: 40 TABLET, DELAYED RELEASE ORAL at 06:25

## 2020-10-31 RX ADMIN — NYSTATIN 500000 UNITS: 500000 SUSPENSION ORAL at 17:37

## 2020-10-31 RX ADMIN — Medication 30 ML: at 21:58

## 2020-10-31 RX ADMIN — ENOXAPARIN SODIUM 40 MG: 40 INJECTION SUBCUTANEOUS at 18:57

## 2020-10-31 RX ADMIN — MONTELUKAST 10 MG: 10 TABLET, FILM COATED ORAL at 20:14

## 2020-10-31 RX ADMIN — BUDESONIDE AND FORMOTEROL FUMARATE DIHYDRATE 2 PUFF: 160; 4.5 AEROSOL RESPIRATORY (INHALATION) at 20:14

## 2020-10-31 RX ADMIN — BUDESONIDE AND FORMOTEROL FUMARATE DIHYDRATE 2 PUFF: 160; 4.5 AEROSOL RESPIRATORY (INHALATION) at 09:34

## 2020-10-31 RX ADMIN — ASPIRIN 81 MG CHEWABLE TABLET 81 MG: 81 TABLET CHEWABLE at 09:33

## 2020-11-01 LAB
ALBUMIN SERPL-MCNC: 2.4 G/DL (ref 3.5–5.2)
ANION GAP SERPL CALCULATED.3IONS-SCNC: 10 MMOL/L (ref 5–15)
BUN SERPL-MCNC: 40 MG/DL (ref 8–23)
BUN/CREAT SERPL: 37.7 (ref 7–25)
CALCIUM SPEC-SCNC: 7.3 MG/DL (ref 8.6–10.5)
CHLORIDE SERPL-SCNC: 107 MMOL/L (ref 98–107)
CO2 SERPL-SCNC: 24 MMOL/L (ref 22–29)
CREAT SERPL-MCNC: 1.06 MG/DL (ref 0.76–1.27)
DEPRECATED RDW RBC AUTO: 43.2 FL (ref 37–54)
ERYTHROCYTE [DISTWIDTH] IN BLOOD BY AUTOMATED COUNT: 13.2 % (ref 12.3–15.4)
GFR SERPL CREATININE-BSD FRML MDRD: 67 ML/MIN/1.73
GLUCOSE BLDC GLUCOMTR-MCNC: 118 MG/DL (ref 70–130)
GLUCOSE BLDC GLUCOMTR-MCNC: 144 MG/DL (ref 70–130)
GLUCOSE BLDC GLUCOMTR-MCNC: 150 MG/DL (ref 70–130)
GLUCOSE BLDC GLUCOMTR-MCNC: 158 MG/DL (ref 70–130)
GLUCOSE SERPL-MCNC: 96 MG/DL (ref 65–99)
HCT VFR BLD AUTO: 40.7 % (ref 37.5–51)
HGB BLD-MCNC: 13.5 G/DL (ref 13–17.7)
MCH RBC QN AUTO: 29.8 PG (ref 26.6–33)
MCHC RBC AUTO-ENTMCNC: 33.2 G/DL (ref 31.5–35.7)
MCV RBC AUTO: 89.8 FL (ref 79–97)
PHOSPHATE SERPL-MCNC: 2.3 MG/DL (ref 2.5–4.5)
PLATELET # BLD AUTO: 196 10*3/MM3 (ref 140–450)
PMV BLD AUTO: 12 FL (ref 6–12)
POTASSIUM SERPL-SCNC: 4.2 MMOL/L (ref 3.5–5.2)
RBC # BLD AUTO: 4.53 10*6/MM3 (ref 4.14–5.8)
SODIUM SERPL-SCNC: 141 MMOL/L (ref 136–145)
WBC # BLD AUTO: 11.88 10*3/MM3 (ref 3.4–10.8)

## 2020-11-01 PROCEDURE — 97110 THERAPEUTIC EXERCISES: CPT

## 2020-11-01 PROCEDURE — 82962 GLUCOSE BLOOD TEST: CPT

## 2020-11-01 PROCEDURE — 25010000002 ENOXAPARIN PER 10 MG: Performed by: EMERGENCY MEDICINE

## 2020-11-01 PROCEDURE — 80069 RENAL FUNCTION PANEL: CPT | Performed by: INTERNAL MEDICINE

## 2020-11-01 PROCEDURE — 99232 SBSQ HOSP IP/OBS MODERATE 35: CPT | Performed by: FAMILY MEDICINE

## 2020-11-01 PROCEDURE — 85027 COMPLETE CBC AUTOMATED: CPT | Performed by: INTERNAL MEDICINE

## 2020-11-01 PROCEDURE — 94660 CPAP INITIATION&MGMT: CPT

## 2020-11-01 RX ADMIN — Medication 30 ML: at 09:25

## 2020-11-01 RX ADMIN — ENOXAPARIN SODIUM 40 MG: 40 INJECTION SUBCUTANEOUS at 18:04

## 2020-11-01 RX ADMIN — NYSTATIN 500000 UNITS: 500000 SUSPENSION ORAL at 21:28

## 2020-11-01 RX ADMIN — SODIUM CHLORIDE, PRESERVATIVE FREE 10 ML: 5 INJECTION INTRAVENOUS at 21:28

## 2020-11-01 RX ADMIN — MIRTAZAPINE 15 MG: 15 TABLET, FILM COATED ORAL at 21:28

## 2020-11-01 RX ADMIN — SODIUM CHLORIDE, PRESERVATIVE FREE 10 ML: 5 INJECTION INTRAVENOUS at 09:24

## 2020-11-01 RX ADMIN — BUDESONIDE AND FORMOTEROL FUMARATE DIHYDRATE 2 PUFF: 160; 4.5 AEROSOL RESPIRATORY (INHALATION) at 21:27

## 2020-11-01 RX ADMIN — BUDESONIDE AND FORMOTEROL FUMARATE DIHYDRATE 2 PUFF: 160; 4.5 AEROSOL RESPIRATORY (INHALATION) at 09:25

## 2020-11-01 RX ADMIN — TERAZOSIN HYDROCHLORIDE 5 MG: 5 CAPSULE ORAL at 21:28

## 2020-11-01 RX ADMIN — ASPIRIN 81 MG CHEWABLE TABLET 81 MG: 81 TABLET CHEWABLE at 09:24

## 2020-11-01 RX ADMIN — MONTELUKAST 10 MG: 10 TABLET, FILM COATED ORAL at 21:28

## 2020-11-01 RX ADMIN — NYSTATIN 500000 UNITS: 500000 SUSPENSION ORAL at 18:04

## 2020-11-01 RX ADMIN — NYSTATIN 500000 UNITS: 500000 SUSPENSION ORAL at 11:46

## 2020-11-01 RX ADMIN — NYSTATIN 500000 UNITS: 500000 SUSPENSION ORAL at 09:24

## 2020-11-01 RX ADMIN — Medication 30 ML: at 22:07

## 2020-11-01 NOTE — PLAN OF CARE
Problem: Noninvasive Ventilation Acute  Goal: Effective Unassisted Ventilation and Oxygenation  Outcome: Ongoing, Progressing     Problem: Noninvasive Ventilation Acute  Goal: Effective Unassisted Ventilation and Oxygenation  Intervention: Monitor and Manage Noninvasive Ventilation  Flowsheets (Taken 11/1/2020 1404)  Airway/Ventilation Management: (stand by) other (see comments)

## 2020-11-01 NOTE — PROGRESS NOTES
[facility]    Name:  Noe Martel   Age:  85 y.o.  Sex:  male  :  1935  MRN:  [id number]   Visit Number:  [id number]  Admission Date:  10/23/2020  Date Of Service:  20  Primary Care Physician:  Devika Foy APRN     LOS: 9 days :  Patient Care Team:  Devika Foy APRN as PCP - General (Family Medicine):    Chief Complaint:      Follow-up on renal failure, COVID-19, dehydration, weakness    Subjective / Interval History:     Patient with no significant changes noted overnight.  Per nursing staff, has had increased oral intake.  Was able to do more with physical therapy with only 1 person assist yesterday.  He has no acute concerns.  He denies any shortness of breath.  He has had issues with incontinence for a while he states.  Denies any fevers or chills.    The patient is an 85-year-old gentleman with multiple medical comorbidities including hypertension, HLD, COPD, CAD who had been admitted on 10/23/2020 with multiple complaints including weakness, Covid-19, aphasia.  His initial work-up in the emergency room demonstrated mild hypoxia with multifocal pneumonia on chest CT.  He had a negative head CT for any acute intracranial process.  COVID-19 testing was positive.  He had no evidence of significant acute kidney injury.  Initially admitted to the ICU and started on antibiotics in addition to Decadron.    Nephrology was consulted due to his degree of renal failure.  He was primarily managed with IV fluid hydration with much improvement in renal function over the past few days.  He was able to come out of the ICU and is currently in the Covid unit on the third floor.  His oxygen saturations have overall been stable.  He did complete a full course of remdesivir which completed on 10/30/2020.  He has been maintained on aspirin in addition to Lovenox.  Has slowly improved with physical therapy particularly over the last 24 to 48 hours.  His oral  intake is somewhat better.  Was started on Remeron yesterday.    Review of Systems:     General ROS: Patient denies any fevers, chills or loss of consciousness.  Respiratory ROS: Denies cough or shortness of breath.  Cardiovascular ROS: Denies chest pain or palpitations. No history of exertional chest pain.  Gastrointestinal ROS: Denies nausea and vomiting. Denies any abdominal pain. No diarrhea.  Neurological ROS: Denies any focal weakness. No loss of consciousness. Denies any numbness.  Dermatological ROS: Denies any redness or pruritis.    Vital Signs:    Temp:  [96.7 °F (35.9 °C)-98.3 °F (36.8 °C)] 98.3 °F (36.8 °C)  Heart Rate:  [] 103  Resp:  [16-18] 18  BP: ()/(59-90) 97/59    Intake and output:    I/O last 3 completed shifts:  In: 1318 [P.O.:240; I.V.:1078]  Out: -   I/O this shift:  In: 60 [P.O.:60]  Out: -     Physical Examination:    General Appearance:  Alert and cooperative, not in any acute distress.  Generally frail appearing elderly male.   Head:  Atraumatic and normocephalic, without obvious abnormality.   Eyes:          PERRLA, conjunctivae and sclerae normal, no Icterus. No pallor. Extraocular movements are within normal limits.   Neck: Supple, trachea midline, no thyromegaly.   Lungs:   Breath sounds heard bilaterally equally.  No crackles or wheezing. No Pleural rub or bronchial breathing.   Heart:  Normal S1 and S2, no murmur, no gallop, no rub. No JVD   Abdomen:   Normal bowel sounds, no masses, no organomegaly. Soft, non-tender, non-distended, no guarding, no rebound tenderness.   Extremities: Moves all extremities well, no edema, no cyanosis, no clubbing.   Skin: No bleeding, bruising or rash.   Neurologic: Awake, alert and oriented times 3. Moves all 4 extremities equally.  Generalized weakness again noted.     Laboratory results:    Results from last 7 days   Lab Units 11/01/20  0750 10/31/20  0745 10/30/20  0655 10/29/20  0600   SODIUM mmol/L 141 138 137 138   POTASSIUM mmol/L  4.2 4.3 4.4 4.2   CHLORIDE mmol/L 107 106 105 109*   CO2 mmol/L 24.0 23.2 22.3 23.4   BUN mg/dL 40* 24* 31* 37*   CREATININE mg/dL 1.06 1.06 1.09 1.03   CALCIUM mg/dL 7.3* 7.4* 7.2* 7.2*   BILIRUBIN mg/dL  --  0.7 0.6 0.5   ALK PHOS U/L  --  46 47 37*   ALT (SGPT) U/L  --  36 31 28   AST (SGOT) U/L  --  33 32 23   GLUCOSE mg/dL 96 94 91 94     Results from last 7 days   Lab Units 11/01/20  0750 10/31/20  0725 10/30/20  0655   WBC 10*3/mm3 11.88* 9.60 9.78   HEMOGLOBIN g/dL 13.5 13.7 13.3   HEMATOCRIT % 40.7 40.6 40.7   PLATELETS 10*3/mm3 196 187 169         Results from last 7 days   Lab Units 10/27/20  1250   TROPONIN T ng/mL <0.010               I have reviewed the patient's laboratory results.    Radiology results:    Imaging Results (Last 24 Hours)     ** No results found for the last 24 hours. **          I have reviewed the patient's radiology reports.    Medication Review:     I have reviewed the patients active and prn medications.       Acute respiratory failure with hypoxia (CMS/HCC)    Hypertension    CAD (coronary artery disease)    GERD (gastroesophageal reflux disease)    COVID-19    Sepsis, unspecified organism (CMS/HCC)      Assessment:    Sepsis  Acute hypoxemic respiratory failure POA  COVID-19 pneumonia  Superimposed community-acquired pneumonia with risk factors for MDR gram-negative raheem POA  Acute metabolic encephalopathy POA  Acute kidney injury versus CKD  Elevated troponin  Chronic: COPD, CAD s/p PCI, HTN, HLD, GERD    Plan:    Patient's vitals reviewed and he continues to remain stable on low oxygen requirements.  Rest of his lab work-up is largely unremarkable.  Albumin noted to be low still.  IV fluids were discontinued yesterday.  Was started on appetite stimulant yesterday evening.  Continue to work with physical therapy today.  Continue on VTE prophylaxis.    Anticipate if patient's intake can improve and he can do better with therapy, he can potentially go home as early as tomorrow  with home health with physical therapy.  Family has talked with case management and seems agreeable to this plan of care.    Magdalene Reis DO  11/01/20  10:48 EST    Dictated utilizing Dragon dictation.

## 2020-11-01 NOTE — PLAN OF CARE
Goal Outcome Evaluation:  Plan of Care Reviewed With: patient  Progress: improving  Outcome Summary: Patient ate Magic cup and half of nectar water container at beginning of shift, refused any other intake.  Patient continues to be pleasently disoriented x3 and cooperative with provided care.  Continue to encourage intake and provide care as appropriate.  Anticipate another day or two stay.

## 2020-11-01 NOTE — THERAPY TREATMENT NOTE
Patient Name: Noe Martel  : 1935    MRN: [id number]                              Today's Date: 2020       Admit Date: 10/23/2020    Visit Dx:     ICD-10-CM ICD-9-CM   1. COVID-19  U07.1 079.89   2. Pneumonia of both lungs due to infectious organism, unspecified part of lung  J18.9 483.8   3. Acute on chronic respiratory failure with hypoxia (CMS/[facility])  J96.21 518.84     799.02   4. Acute renal failure, unspecified acute renal failure type (CMS/[facility])  N17.9 584.9   5. Transaminitis  R74.01 790.4     Patient Active Problem List   Diagnosis   • Benign prostatic hyperplasia   • Hypertension   • CAD (coronary artery disease)   • Dyslipidemia   • Chronic back pain   • GERD (gastroesophageal reflux disease)   • COPD (chronic obstructive pulmonary disease) (CMS/[facility])   • COVID-19   • Acute respiratory failure with hypoxia (CMS/[facility])   • Sepsis, unspecified organism (CMS/[facility])     Past Medical History:   Diagnosis Date   • BPH (benign prostatic hyperplasia)    • CAD (coronary artery disease)     status post drug-eluting stent deployment in the ostium of the right coronary artery and balloon angioplasty of the obtuse marginal branch of the left circumflex, 2015.  Ejection fraction 60%.   • Chronic back pain    • COPD (chronic obstructive pulmonary disease) (CMS/[facility])    • Dyslipidemia      untreated.    • GERD (gastroesophageal reflux disease)    • Hyperlipidemia    • Hypertension    • Impaired functional mobility, balance, gait, and endurance      Past Surgical History:   Procedure Laterality Date   • CARDIAC SURGERY      STENT PLACEMENT   • CHOLECYSTECTOMY     • HERNIA REPAIR      bilateral     General Information     Row Name 20 1115          Physical Therapy Time and Intention    Document Type  therapy note (daily note)  -TW     Mode of Treatment  physical therapy  -TW     Row Name 20 1115          Cognition    Orientation Status (Cognition)  oriented to;person;situation  -TW     Row Name  11/01/20 [facility]5          Safety Issues, Functional Mobility    Comment, Safety Issues/Impairments (Mobility)  For PT treatment this date pt kept falling asleep during ther ex and due to decreased alertness therapist held sitting EOB or standing.  -       User Key  (r) = Recorded By, (t) = Taken By, (c) = Cosigned By    Initials Name Provider Type    TW Angela Mcelroy, PT Physical Therapist        Mobility     [facility] Name 11/01/20 [facility]          Bed Mobility    Supine-Sit Weber (Bed Mobility)  unable to assess  -     Sit-Supine Weber (Bed Mobility)  unable to assess  -[facility] Name 11/01/20 [facility]          Bed-Chair Transfer    Bed-Chair Weber (Transfers)  unable to assess  -[facility] Name 11/01/20 [facility]          Sit-Stand Transfer    Sit-Stand Weber (Transfers)  unable to assess  -       User Key  (r) = Recorded By, (t) = Taken By, (c) = Cosigned By    Initials Name Provider Type    TW Lilibeth, Angela, PT Physical Therapist        Obj/Interventions     [facility] Name 11/01/20 [facility]5          Motor Skills    Therapeutic Exercise  knee;hip;ankle  -[facility] Name 11/01/20 [facility]          Hip (Therapeutic Exercise)    Hip (Therapeutic Exercise)  -- B hip PROM to AAROM heelslides, hip abd/add x 10  -[facility] Name 11/01/20 [facility]5          Knee (Therapeutic Exercise)    Knee (Therapeutic Exercise)  -- Pt kept falling asleep during quad sets performing 5 B; pt able to perform SAQs B partial range with limited eccentric control.  -[facility] Name 11/01/20 1115          Ankle (Therapeutic Exercise)    Ankle (Therapeutic Exercise)  -- B PROM for dorsiflexion with dorsiflexion stretch B x 5 post 10 PROM  -       User Key  (r) = Recorded By, (t) = Taken By, (c) = Cosigned By    Initials Name Provider Type     Angela Mcelroy, PT Physical Therapist        Goals/Plan    No documentation.       Clinical Impression     [facility] Name 11/01/20 [facility]5          Pain Scale: Numbers Pre/Post-Treatment    Pretreatment Pain  Rating  0/10 - no pain  -TW     Posttreatment Pain Rating  0/10 - no pain  -TW     Row Name 11/01/20 1115          Plan of Care Review    Plan of Care Reviewed With  patient  -TW     Progress  no change  -TW     Outcome Summary  PT treatment completed this am with pt very lethargic and inconsistent with LE ther ex (see flowsheet for details) due to falling asleep during ther ex. Held sitting on EOB and transfers due to pt's decreased alertness/fatigue. Cont with current PT POC.  -TW     Row Name 11/01/20 1115          Vital Signs    Pretreatment Heart Rate (beats/min)  90  -TW     Posttreatment Heart Rate (beats/min)  95  -TW     Pre SpO2 (%)  94  -TW     O2 Delivery Pre Treatment  -- 4 L  -TW     Intra SpO2 (%)  93  -TW     O2 Delivery Intra Treatment  supplemental O2 4 L  -TW     Pre Patient Position  Supine  -TW     Intra Patient Position  Supine  -TW     Post Patient Position  Supine  -TW     Row Name 11/01/20 1115          Positioning and Restraints    Pre-Treatment Position  in bed  -TW     Post Treatment Position  bed  -TW     In Bed  supine;call light within reach;encouraged to call for assist;side rails up x3;legs elevated  -TW       User Key  (r) = Recorded By, (t) = Taken By, (c) = Cosigned By    Initials Name Provider Type    TW Angela Mcelroy, PT Physical Therapist        Outcome Measures     Row Name 11/01/20 1115          How much help from another person do you currently need...    Turning from your back to your side while in flat bed without using bedrails?  3  -TW     Moving from lying on back to sitting on the side of a flat bed without bedrails?  3  -TW     Moving to and from a bed to a chair (including a wheelchair)?  3  -TW     Standing up from a chair using your arms (e.g., wheelchair, bedside chair)?  2  -TW     Climbing 3-5 steps with a railing?  1  -TW     Row Name 11/01/20 1115          Functional Assessment    Outcome Measure Options  AM-PAC 6 Clicks Basic Mobility (PT)  -TW       User Key   (r) = Recorded By, (t) = Taken By, (c) = Cosigned By    Initials Name Provider Type    TW Angela Mcelroy, PT Physical Therapist        Physical Therapy Education                 Title: PT OT SLP Therapies (Done)     Topic: Physical Therapy (Done)     Point: Mobility training (Done)     Learning Progress Summary           Patient Acceptance, D,E, DU,NR by  at 10/31/2020 1022    Comment: Pt education for LE ther ex technique with pt needing visual cues to perform correctly. Pt education also with rolling walker for transfer technique    Acceptance, E,TB, VU by  at 10/29/2020 1514    Comment: Purpose of PT; ther ex for HEP.    Acceptance, E,TB, VU by  at 10/28/2020 1600    Comment: Purpose of PT/POC.                   Point: Home exercise program (Done)     Learning Progress Summary           Patient Acceptance, E,D, DU,NR by  at 11/1/2020 1115    Comment: Pt education on LE ther ex technique. Pt falling asleep during session and will need reinforcement of ther ex.    Acceptance, D,E, DU,NR by  at 10/31/2020 1022    Comment: Pt education for LE ther ex technique with pt needing visual cues to perform correctly. Pt education also with rolling walker for transfer technique    Acceptance, E,TB, VU by  at 10/29/2020 1514    Comment: Purpose of PT; ther ex for HEP.    Acceptance, E,TB, VU by  at 10/28/2020 1600    Comment: Purpose of PT/POC.                   Point: Precautions (Done)     Learning Progress Summary           Patient Acceptance, E,TB, VU by  at 10/29/2020 1514    Comment: Purpose of PT; ther ex for HEP.    Acceptance, E,TB, VU by  at 10/28/2020 1600    Comment: Purpose of PT/POC.                               User Key     Initials Effective Dates Name Provider Type Discipline     04/03/18 -  Jeni Gracia, EDDIE Physical Therapist PT     03/26/19 -  Angela Mcelroy PT Physical Therapist PT              PT Recommendation and Plan     Plan of Care Reviewed With: patient  Progress: no  change  Outcome Summary: PT treatment completed this am with pt very lethargic and inconsistent with LE ther ex (see flowsheet for details) due to falling asleep during ther ex. Held sitting on EOB and transfers due to pt's decreased alertness/fatigue. Cont with current PT POC.     Time Calculation:   PT Charges     Row Name 11/01/20 1115             Time Calculation    Start Time  1104  -TW      Stop Time  1115  -TW      Time Calculation (min)  11 min  -TW         Timed Charges    02283 - PT Therapeutic Exercise Minutes  11  -TW        User Key  (r) = Recorded By, (t) = Taken By, (c) = Cosigned By    Initials Name Provider Type    TW Angela Mcelroy, PT Physical Therapist        Therapy Charges for Today     Code Description Service Date Service Provider Modifiers Qty    71230421789 HC PT NEUROMUSC RE EDUCATION EA 15 MIN 10/31/2020 Angela Mcelroy, PT GP 1    53423645031 HC PT THER PROC EA 15 MIN 10/31/2020 Angela Mcelroy, PT GP 1    76974757691 HC PT THER PROC EA 15 MIN 11/1/2020 Angela Mcelroy PT GP 1          PT G-Codes  Outcome Measure Options: AM-PAC 6 Clicks Basic Mobility (PT)  AM-PAC 6 Clicks Score (PT): 13    Angela Mcelroy PT  11/1/2020

## 2020-11-01 NOTE — PLAN OF CARE
Problem: Adult Inpatient Plan of Care  Goal: Plan of Care Review  Recent Flowsheet Documentation  Taken 11/1/2020 1115 by Angela Mcelroy, PT  Progress: no change  Plan of Care Reviewed With: patient  Outcome Summary: PT treatment completed this am with pt very lethargic and inconsistent with LE ther ex (see flowsheet for details) due to falling asleep during ther ex. Held sitting on EOB and transfers due to pt's decreased alertness/fatigue. Cont with current PT POC.

## 2020-11-02 LAB
ALBUMIN SERPL-MCNC: 2.3 G/DL (ref 3.5–5.2)
ANION GAP SERPL CALCULATED.3IONS-SCNC: 8.6 MMOL/L (ref 5–15)
BUN SERPL-MCNC: 43 MG/DL (ref 8–23)
BUN/CREAT SERPL: 43.9 (ref 7–25)
CALCIUM SPEC-SCNC: 7.2 MG/DL (ref 8.6–10.5)
CHLORIDE SERPL-SCNC: 110 MMOL/L (ref 98–107)
CO2 SERPL-SCNC: 24.4 MMOL/L (ref 22–29)
CREAT SERPL-MCNC: 0.98 MG/DL (ref 0.76–1.27)
DEPRECATED RDW RBC AUTO: 44.2 FL (ref 37–54)
ERYTHROCYTE [DISTWIDTH] IN BLOOD BY AUTOMATED COUNT: 13.2 % (ref 12.3–15.4)
GFR SERPL CREATININE-BSD FRML MDRD: 73 ML/MIN/1.73
GLUCOSE BLDC GLUCOMTR-MCNC: 109 MG/DL (ref 70–130)
GLUCOSE BLDC GLUCOMTR-MCNC: 109 MG/DL (ref 70–130)
GLUCOSE BLDC GLUCOMTR-MCNC: 116 MG/DL (ref 70–130)
GLUCOSE BLDC GLUCOMTR-MCNC: 124 MG/DL (ref 70–130)
GLUCOSE SERPL-MCNC: 117 MG/DL (ref 65–99)
HCT VFR BLD AUTO: 37.5 % (ref 37.5–51)
HGB BLD-MCNC: 12 G/DL (ref 13–17.7)
MCH RBC QN AUTO: 29.1 PG (ref 26.6–33)
MCHC RBC AUTO-ENTMCNC: 32 G/DL (ref 31.5–35.7)
MCV RBC AUTO: 91 FL (ref 79–97)
PHOSPHATE SERPL-MCNC: 2.3 MG/DL (ref 2.5–4.5)
PLATELET # BLD AUTO: 200 10*3/MM3 (ref 140–450)
PMV BLD AUTO: 11.9 FL (ref 6–12)
POTASSIUM SERPL-SCNC: 4.2 MMOL/L (ref 3.5–5.2)
RBC # BLD AUTO: 4.12 10*6/MM3 (ref 4.14–5.8)
SODIUM SERPL-SCNC: 143 MMOL/L (ref 136–145)
WBC # BLD AUTO: 13.99 10*3/MM3 (ref 3.4–10.8)

## 2020-11-02 PROCEDURE — 80069 RENAL FUNCTION PANEL: CPT | Performed by: INTERNAL MEDICINE

## 2020-11-02 PROCEDURE — 97110 THERAPEUTIC EXERCISES: CPT

## 2020-11-02 PROCEDURE — 82962 GLUCOSE BLOOD TEST: CPT

## 2020-11-02 PROCEDURE — 99232 SBSQ HOSP IP/OBS MODERATE 35: CPT | Performed by: INTERNAL MEDICINE

## 2020-11-02 PROCEDURE — 97530 THERAPEUTIC ACTIVITIES: CPT

## 2020-11-02 PROCEDURE — 25010000002 ENOXAPARIN PER 10 MG: Performed by: EMERGENCY MEDICINE

## 2020-11-02 PROCEDURE — 94799 UNLISTED PULMONARY SVC/PX: CPT

## 2020-11-02 PROCEDURE — 85027 COMPLETE CBC AUTOMATED: CPT | Performed by: INTERNAL MEDICINE

## 2020-11-02 RX ORDER — METOPROLOL TARTRATE 5 MG/5ML
2.5 INJECTION INTRAVENOUS EVERY 6 HOURS PRN
Status: DISCONTINUED | OUTPATIENT
Start: 2020-11-02 | End: 2020-11-12 | Stop reason: HOSPADM

## 2020-11-02 RX ADMIN — MONTELUKAST 10 MG: 10 TABLET, FILM COATED ORAL at 21:14

## 2020-11-02 RX ADMIN — BUDESONIDE AND FORMOTEROL FUMARATE DIHYDRATE 2 PUFF: 160; 4.5 AEROSOL RESPIRATORY (INHALATION) at 08:46

## 2020-11-02 RX ADMIN — SODIUM CHLORIDE, PRESERVATIVE FREE 10 ML: 5 INJECTION INTRAVENOUS at 08:46

## 2020-11-02 RX ADMIN — ASPIRIN 81 MG CHEWABLE TABLET 81 MG: 81 TABLET CHEWABLE at 08:46

## 2020-11-02 RX ADMIN — METOPROLOL TARTRATE 25 MG: 25 TABLET, FILM COATED ORAL at 17:26

## 2020-11-02 RX ADMIN — TERAZOSIN HYDROCHLORIDE 5 MG: 5 CAPSULE ORAL at 21:14

## 2020-11-02 RX ADMIN — BUDESONIDE AND FORMOTEROL FUMARATE DIHYDRATE 2 PUFF: 160; 4.5 AEROSOL RESPIRATORY (INHALATION) at 21:13

## 2020-11-02 RX ADMIN — NYSTATIN 500000 UNITS: 500000 SUSPENSION ORAL at 21:14

## 2020-11-02 RX ADMIN — MIRTAZAPINE 15 MG: 15 TABLET, FILM COATED ORAL at 21:15

## 2020-11-02 RX ADMIN — SODIUM CHLORIDE, PRESERVATIVE FREE 10 ML: 5 INJECTION INTRAVENOUS at 21:15

## 2020-11-02 RX ADMIN — PANTOPRAZOLE SODIUM 40 MG: 40 TABLET, DELAYED RELEASE ORAL at 06:21

## 2020-11-02 RX ADMIN — ENOXAPARIN SODIUM 40 MG: 40 INJECTION SUBCUTANEOUS at 18:11

## 2020-11-02 RX ADMIN — Medication 30 ML: at 08:46

## 2020-11-02 RX ADMIN — NYSTATIN 500000 UNITS: 500000 SUSPENSION ORAL at 17:26

## 2020-11-02 RX ADMIN — NYSTATIN 500000 UNITS: 500000 SUSPENSION ORAL at 08:46

## 2020-11-02 RX ADMIN — Medication 30 ML: at 21:14

## 2020-11-02 NOTE — THERAPY TREATMENT NOTE
Patient Name: Noe Martel  : 1935    MRN: [id number]                              Today's Date: 2020       Admit Date: 10/23/2020    Visit Dx:     ICD-10-CM ICD-9-CM   1. COVID-19  U07.1 079.89   2. Pneumonia of both lungs due to infectious organism, unspecified part of lung  J18.9 483.8   3. Acute on chronic respiratory failure with hypoxia (CMS/[facility])  J96.21 518.84     799.02   4. Acute renal failure, unspecified acute renal failure type (CMS/[facility])  N17.9 584.9   5. Transaminitis  R74.01 790.4   6. Acute respiratory failure with hypoxia (CMS/[facility])  J96.01 518.81   7. Chronic obstructive pulmonary disease, unspecified COPD type (CMS/[facility])  J44.9 496     Patient Active Problem List   Diagnosis   • Benign prostatic hyperplasia   • Hypertension   • CAD (coronary artery disease)   • Dyslipidemia   • Chronic back pain   • GERD (gastroesophageal reflux disease)   • COPD (chronic obstructive pulmonary disease) (CMS/[facility])   • COVID-19   • Acute respiratory failure with hypoxia (CMS/[facility])   • Sepsis, unspecified organism (CMS/[facility])     Past Medical History:   Diagnosis Date   • BPH (benign prostatic hyperplasia)    • CAD (coronary artery disease)     status post drug-eluting stent deployment in the ostium of the right coronary artery and balloon angioplasty of the obtuse marginal branch of the left circumflex, 2015.  Ejection fraction 60%.   • Chronic back pain    • COPD (chronic obstructive pulmonary disease) (CMS/[facility])    • Dyslipidemia      untreated.    • GERD (gastroesophageal reflux disease)    • Hyperlipidemia    • Hypertension    • Impaired functional mobility, balance, gait, and endurance      Past Surgical History:   Procedure Laterality Date   • CARDIAC SURGERY      STENT PLACEMENT   • CHOLECYSTECTOMY     • HERNIA REPAIR      bilateral     General Information     Row Name 20 1354          Physical Therapy Time and Intention    Document Type  therapy note (daily note)  -     Mode of  Treatment  physical therapy  -     Row Name 11/02/20 1354          General Information    Existing Precautions/Restrictions  fall  -LM     Barriers to Rehab  hearing deficit;cognitive status  -     Row Name 11/02/20 1354          Safety Issues, Functional Mobility    Safety Issues Affecting Function (Mobility)  at risk behavior observed;awareness of need for assistance;impulsivity;insight into deficits/self-awareness;judgment;safety precaution awareness;safety precautions follow-through/compliance  -LM     Impairments Affecting Function (Mobility)  balance;cognition;endurance/activity tolerance;strength;postural/trunk control  -     Cognitive Impairments, Mobility Safety/Performance  awareness, need for assistance;impulsivity;insight into deficits/self-awareness;safety precaution awareness;safety precaution follow-through  -LM       User Key  (r) = Recorded By, (t) = Taken By, (c) = Cosigned By    Initials Name Provider Type    Jeni Ventura, EDDIE Physical Therapist        Mobility     Row Name 11/02/20 2752          Bed Mobility    Bed Mobility  supine-sit;sit-supine  -LM     Supine-Sit [place] (Bed Mobility)  moderate assist (50% patient effort);maximum assist (25% patient effort);verbal cues  -LM     Sit-Supine [place] (Bed Mobility)  moderate assist (50% patient effort);maximum assist (25% patient effort);verbal cues  -LM     Assistive Device (Bed Mobility)  bed rails;draw sheet;head of bed elevated  -     Row Name 11/02/20 1352          Transfers    Comment (Transfers)  Attempted sit to stand from EOB x 3- patient unable to achieve full stand.  -     Row Name 11/02/20 1350          Sit-Stand Transfer    Sit-Stand [place] (Transfers)  maximum assist (25% patient effort)  -LM     Assistive Device (Sit-Stand Transfers)  walker, front-wheeled  -       User Key  (r) = Recorded By, (t) = Taken By, (c) = Cosigned By    Initials Name Provider Type    Jeni Ventura PT Physical Therapist         Obj/Interventions     Row Name 11/02/20 [facility]          Motor Skills    Therapeutic Exercise  other (see comments) AP, heel slides, hip ABD/ADD, SLR- AAROM/AROM x 10 reps in supine.  -LM     Row Name 11/02/20 [facility]          Balance    Static Sitting Balance  mild impairment;sitting, edge of bed  -LM     Dynamic Sitting Balance  mild impairment;sitting, edge of bed  -LM     Static Standing Balance  severe impairment;standing  -LM     Dynamic Standing Balance  severe impairment;standing  -LM       User Key  (r) = Recorded By, (t) = Taken By, (c) = Cosigned By    Initials Name Provider Type    Jeni Ventura, PT Physical Therapist        Goals/Plan    No documentation.       Clinical Impression     Row Name 11/02/20 [facility]          Pain    Additional Documentation  Pain Scale: Numbers Pre/Post-Treatment (Group)  -[facility] Name 11/02/20 [facility]          Pain Scale: Numbers Pre/Post-Treatment    Pretreatment Pain Rating  0/10 - no pain  -LM     Posttreatment Pain Rating  0/10 - no pain  -     Row Name 11/02/20 [facility]          Plan of Care Review    Plan of Care Reviewed With  patient  -LM     Progress  improving  -     Outcome Summary  PT tx completed. Patient performs B LE ther ex as indicated on care map. Requires mod-max A sup to sit. Attempted sit to stand from EOB x 3 with RW and max A- patient unable to achieve full stand. Cont PT per POC.  -LM     Row Name 11/02/20 1357          Therapy Assessment/Plan (PT)    Rehab Potential (PT)  good, to achieve stated therapy goals  -LM     Row Name 11/02/20 1352          Vital Signs    O2 Delivery Pre Treatment  room air  -LM     O2 Delivery Intra Treatment  room air  -LM     O2 Delivery Post Treatment  room air  -LM     Row Name 11/02/20 1353          Positioning and Restraints    Pre-Treatment Position  in bed  -LM     Post Treatment Position  bed  -LM     In Bed  notified nsg;supine;call light within reach;encouraged to call for assist;exit alarm on  -LM       User  Key  (r) = Recorded By, (t) = Taken By, (c) = Cosigned By    Initials Name Provider Type    Jeni Ventura, EDDIE Physical Therapist        Outcome Measures     Row Name 11/02/20 1354          How much help from another person do you currently need...    Turning from your back to your side while in flat bed without using bedrails?  2  -LM     Moving from lying on back to sitting on the side of a flat bed without bedrails?  2  -LM     Moving to and from a bed to a chair (including a wheelchair)?  1  -LM     Standing up from a chair using your arms (e.g., wheelchair, bedside chair)?  2  -LM     Climbing 3-5 steps with a railing?  1  -LM     To walk in hospital room?  1  -LM     AM-PAC 6 Clicks Score (PT)  9  -LM     Row Name 11/02/20 1355          Functional Assessment    Outcome Measure Options  AM-PAC 6 Clicks Basic Mobility (PT)  -LM       User Key  (r) = Recorded By, (t) = Taken By, (c) = Cosigned By    Initials Name Provider Type    Jeni Ventura, EDDIE Physical Therapist        Physical Therapy Education                 Title: PT OT SLP Therapies (Done)     Topic: Physical Therapy (Done)     Point: Mobility training (Done)     Learning Progress Summary           Patient Acceptance, E,TB, VU by  at 11/2/2020 1534    Comment: Ther ex ; purpose of PT.    Acceptance, D,E, DU,NR by  at 10/31/2020 1022    Comment: Pt education for LE ther ex technique with pt needing visual cues to perform correctly. Pt education also with rolling walker for transfer technique    Acceptance, E,TB, VU by  at 10/29/2020 1514    Comment: Purpose of PT; ther ex for HEP.    Acceptance, E,TB, VU by  at 10/28/2020 1600    Comment: Purpose of PT/POC.                   Point: Home exercise program (Done)     Learning Progress Summary           Patient Acceptance, E,TB, VU by  at 11/2/2020 1534    Comment: Ther ex ; purpose of PT.    Acceptance, E,D, DU,NR by  at 11/1/2020 1115    Comment: Pt education on LE ther ex technique. Pt  falling asleep during session and will need reinforcement of ther ex.    Acceptance, D,E, DU,NR by  at 10/31/2020 1022    Comment: Pt education for LE ther ex technique with pt needing visual cues to perform correctly. Pt education also with rolling walker for transfer technique    Acceptance, E,TB, VU by  at 10/29/2020 1514    Comment: Purpose of PT; ther ex for HEP.    Acceptance, E,TB, VU by  at 10/28/2020 1600    Comment: Purpose of PT/POC.                   Point: Precautions (Done)     Learning Progress Summary           Patient Acceptance, E,TB, VU by  at 11/2/2020 1534    Comment: Ther ex ; purpose of PT.    Acceptance, E,TB, VU by  at 10/29/2020 1514    Comment: Purpose of PT; ther ex for HEP.    Acceptance, E,TB, VU by  at 10/28/2020 1600    Comment: Purpose of PT/POC.                               User Key     Initials Effective Dates Name Provider Type Discipline     04/03/18 -  Jeni Gracia, PT Physical Therapist PT     03/26/19 -  Angela Mcelroy, PT Physical Therapist PT              PT Recommendation and Plan  Planned Therapy Interventions (PT): balance training, bed mobility training, gait training, transfer training, home exercise program, patient/family education, strengthening  Plan of Care Reviewed With: patient  Progress: improving  Outcome Summary: PT tx completed. Patient performs B LE ther ex as indicated on care map. Requires mod-max A sup to sit. Attempted sit to stand from EOB x 3 with RW and max A- patient unable to achieve full stand. Cont PT per POC.     Time Calculation:   PT Charges     Row Name 11/02/20 1535             Time Calculation    Start Time  1354  -      PT Received On  11/02/20  -         Time Calculation- PT    Total Timed Code Minutes- PT  49 minute(s)  -        User Key  (r) = Recorded By, (t) = Taken By, (c) = Cosigned By    Initials Name Provider Type     Jeni Gracia, PT Physical Therapist        Therapy Charges for Today     Code Description  Service Date Service Provider Modifiers Qty    71361444188  PT THER PROC EA 15 MIN 11/2/2020 Jeni Gracia, PT GP 2    33971027512 HC PT THERAPEUTIC ACT EA 15 MIN 11/2/2020 Jeni Gracia, PT GP 1          PT G-Codes  Outcome Measure Options: AM-PAC 6 Clicks Basic Mobility (PT)  AM-PAC 6 Clicks Score (PT): 9    Jeni Gracia, PT  11/2/2020

## 2020-11-02 NOTE — PLAN OF CARE
Problem: Adult Inpatient Plan of Care  Goal: Plan of Care Review  Recent Flowsheet Documentation  Taken 11/2/2020 1504 by Jeni Gracia, PT  Progress: improving  Plan of Care Reviewed With: patient  Outcome Summary: PT tx completed. Patient performs B LE ther ex as indicated on care map. Requires mod-max A sup to sit. Attempted sit to stand from EOB x 3 with RW and max A- patient unable to achieve full stand. Cont PT per POC.

## 2020-11-02 NOTE — DISCHARGE SUMMARY
[facility]   DISCHARGE SUMMARY      Name:  Noe Martel   Age:  85 y.o.  Sex:  male  :  1935  MRN:  [id number]   Visit Number:  [id number]    Admission Date:  10/23/2020  Date of Discharge:  2020  Primary Care Physician:  Devika Foy APRN    Important issues to note:    1.  No change has been made to his home medication regimen.  2.  No need of antibiotics at discharge.  3.  Patient has been arranged home health and home oxygen.  4.  Follow-up with primary care provider in 1 week.    Discharge Diagnoses:     1.  Acute hypoxic respiratory failure secondary to #2, present on admission.  2.  Patchy bilateral COVID-19 pneumonia, present on admission, improved.  3.  Acute renal failure, present on admission, not related to sepsis, resolved.  4.  Sepsis secondary to #2, present on admission, resolved.  5.  Essential hypertension.  6.  Coronary artery disease.  7.  Gastroesophageal reflux disease.    Problem List:       Acute respiratory failure with hypoxia (CMS/HCC)    Hypertension    CAD (coronary artery disease)    GERD (gastroesophageal reflux disease)    COVID-19    Sepsis, unspecified organism (CMS/[facility])    Presenting Problem:    COVID-19 [U07.1]     Consults:     Consulting Physician(s)     Provider Relationship Specialty    Steve Ybarra MD, DARRELL Consulting Physician Nephrology        Procedures Performed:    None.    History of presenting illness/Hospital Course:    Mr. Martel is an 85-year-old gentleman with multiple medical comorbidities including hypertension, dyslipidemia, COPD, CAD who was admitted on 10/23/2020 with multiple complaints including weakness, Covid-19, aphasia.  His initial work-up in the emergency room demonstrated mild hypoxia with multifocal pneumonia on chest CT.  He had a negative head CT for any acute intracranial process.  COVID-19 testing was positive.    He was noted to have dehydration and worsening creatinine level at 3.08.    He was  given IV fluids.  He was initially admitted to the ICU and started on antibiotics in addition to Decadron.     Nephrology was consulted due to his degree of renal failure.  He was primarily managed with IV fluid hydration with much improvement in renal function over the past few days.  He was able to come out of the ICU transfer to the Covid unit on the third floor.  His oxygen saturations have overall been stable.  He did complete a full course of remdesivir which completed on 10/30/2020.  He has been maintained on aspirin in addition to Lovenox.      Patient does have significant generalized weakness and was started on physical and Occupational Therapy.  He also seems to be having underlying mild cognitive dysfunction and early dementia.  Patient does need significant assistance to get out of bed and initially the family was approached for possible short-term rehabilitation placement.  Patient's family initially agreed but then subsequently changed their mind and wanted to take him home with home health.  In the last 12 hours, patient has been off oxygen saturating in the low 90s.  He will be discharged home with home health today.  I will also place him on nasal cannula oxygen as needed at home.  His renal function has significantly improved back to normal.  His oral intake is slowly improving.  He would benefit from nutritional supplements at home.  I have discussed the patient's condition and discharge plan with his son Oral over the phone.  Patient has been advised to follow-up with primary care provider in 1 week.  Patient would benefit from continued quarantine at home for 7 more days.    Vital Signs:    Temp:  [97.9 °F (36.6 °C)-98.7 °F (37.1 °C)] 97.9 °F (36.6 °C)  Heart Rate:  [] 98  Resp:  [18-20] 18  BP: ()/(61-93) 120/71    Physical Exam:    General Appearance:  Alert and cooperative, not in any acute distress.   Head:  Atraumatic and normocephalic, without obvious abnormality.   Eyes:           PERRLA, conjunctivae and sclerae normal, no icterus. No pallor. Extraocular movements are within normal limits.   Ears:  Ears appear intact with no abnormalities noted.   Throat: No oral lesions, no thrush, oral mucosa moist.   Neck: Supple, trachea midline, no thyromegaly, no carotid bruit.   Back:   No kyphoscoliosis present. No tenderness to palpation,   range of motion normal.   Lungs:   Chest shape is normal. Breath sounds heard bilaterally equally.  No crackles or wheezing. No Pleural rub or bronchial breathing.   Heart:  Normal S1 and S2, no murmur, no gallop, no rub. No JVD.   Abdomen:   Normal bowel sounds, no masses, no organomegaly. Soft, nontender, nondistended, no guarding, no rebound tenderness.  Ecchymotic patches noted on the abdominal wall.   Extremities: Moves all extremities, no edema, no cyanosis, no clubbing.   Pulses: Pulses palpable and equal bilaterally.   Skin: No bleeding or rash.  Skin excoriation noted in the right hip area.  No erythema.   Neurologic: Alert but pleasantly confused due to dementia. Moves all four limbs equally but does have generalized weakness. No tremors. No facial asymmetry.     Pertinent Lab Results:     Results from last 7 days   Lab Units 11/02/20 0630 11/01/20  0750 10/31/20  0745 10/30/20  0655 10/29/20  0600   SODIUM mmol/L 143 141 138 137 138   POTASSIUM mmol/L 4.2 4.2 4.3 4.4 4.2   CHLORIDE mmol/L 110* 107 106 105 109*   CO2 mmol/L 24.4 24.0 23.2 22.3 23.4   BUN mg/dL 43* 40* 24* 31* 37*   CREATININE mg/dL 0.98 1.06 1.06 1.09 1.03   CALCIUM mg/dL 7.2* 7.3* 7.4* 7.2* 7.2*   BILIRUBIN mg/dL  --   --  0.7 0.6 0.5   ALK PHOS U/L  --   --  46 47 37*   ALT (SGPT) U/L  --   --  36 31 28   AST (SGOT) U/L  --   --  33 32 23   GLUCOSE mg/dL 117* 96 94 91 94     Results from last 7 days   Lab Units 11/02/20 0630 11/01/20  0750 10/31/20  0725   WBC 10*3/mm3 13.99* 11.88* 9.60   HEMOGLOBIN g/dL 12.0* 13.5 13.7   HEMATOCRIT % 37.5 40.7 40.6   PLATELETS 10*3/mm3 200  196 187         Results from last 7 days   Lab Units 10/27/20  1250   TROPONIN T ng/mL <0.010     Pertinent Radiology Results:    Imaging Results (All)     Procedure Component Value Units Date/Time    CT Head Without Contrast [[id number]] Collected: 10/28/20 1847     Updated: 10/28/20 1848    Narrative:      FINAL REPORT    TECHNIQUE:  Routine axial images through the head were obtained without  contrast.    CLINICAL HISTORY:  Mental status change, unknown cause    FINDINGS:  The ventricles are dilated, proportionate to the degree of  generalized atrophy.  Periventricular and deep white matter  low-attenuation areas are consistent with chronic small vessel  ischemia.  There is no mass or shift in midline structures.  There is no intracranial hemorrhage.  There is no acute sinus or  osseous abnormality.      Impression:      No acute intracranial abnormality.    Authenticated by Ricky St M.D. on 10/28/2020 06:47:11 PM    XR Chest 1 View [[id number]] Collected: 10/28/20 0757     Updated: 10/28/20 0800    Narrative:      PORTABLE CHEST     INDICATION: Pneumonia.     FINDINGS: Single frontal portable chest. Comparison with 10/23/2020. EKG  leads overlie the chest. Cardiac silhouette is unchanged. Scattered  vascular calcifications. Degenerative changes in the spine and  shoulders. No pneumothorax. Bilateral parenchymal infiltrates appear  slightly improved. No other interval change. Gaseous distention of bowel  in the right upper abdomen.       Impression:      Slight improvement in aeration. Continued follow-up  recommended.     This report was finalized on 10/28/2020 7:58 AM by Jeffery Zaidi MD.    XR Chest 1 View [[id number]] Collected: 10/24/20 0831     Updated: 10/24/20 0834    Narrative:      PROCEDURE: XR CHEST 1 VW-     INDICATION:  resp distress     FINDINGS:  A portable view of the chest was obtained.  There is no prior  exam for comparison.  Heart size is normal. Lung volumes are low. There  are patchy, upper  lobe predominant, left greater than right bilateral  opacities concerning for bilateral pneumonia. There is no pneumothorax.       Impression:      Left greater than right patchy opacities concerning for  bilateral pneumonia. Recommend radiographic follow-up.     This report was finalized on 10/24/2020 8:31 AM by Montserrat Castano MD.    CT Chest Without Contrast [[id number]] Collected: 10/23/20 1752     Updated: 10/23/20 1753    Narrative:      FINAL REPORT    TECHNIQUE:  Axial imaging of the chest was obtained without contrast.  Reformatted images were also obtained and reviewed.This study  was performed with techniques to keep radiation doses as low as  reasonably achievable, (ALARA). Individualized dose reduction  technique using automated exposure control or adjustment of mA  and/or kV according to the patient's size were employed.    CLINICAL HISTORY:  + covid    FINDINGS:  There is no axillary adenopathy. There is no hilar or  mediastinal mass or adenopathy. Heart size is normal. There is  no pericardial or pleural effusion. Limited images of the upper  abdomen are unremarkable.  There is left lower lobe  consolidation.  Patchy ground glass opacities are seen within  the upper lobes.      Impression:      Multifocal pneumonia which may be viral or bacterial    Authenticated by REBECCA West MD on 10/23/2020 05:52:27 PM    CT Head Without Contrast [[id number]] Collected: 10/23/20 1743     Updated: 10/23/20 1744    Narrative:      FINAL REPORT    TECHNIQUE:  Axial images were performed through the brain.This study was  performed with techniques to keep radiation doses as low as  reasonably achievable, (ALARA). Individualized dose reduction  techniques using automated exposure control or adjustment of mA  and/or kV according to the patient''s size were employed.    CLINICAL HISTORY:  aphasia, difficulty walking    FINDINGS:  There is advanced global atrophy and chronic microvascular  ischemic change.   The  ventricles are normal in size for the  degree of atrophy.  There is no extra-axial fluid or midline  shift.  There is no evidence of acute hemorrhage or mass.      Impression:      Atrophy.  No acute intracranial process.    Authenticated by REBECCA West MD on 10/23/2020 05:43:38 PM        Condition on Discharge:      Stable.    Code status during the hospital stay:    Code Status and Medical Interventions:   Ordered at: 10/23/20 3584     Code Status:    CPR     Medical Interventions (Level of Support Prior to Arrest):    Full     [place] Dis[place] [facility] [place] Medications:       Discharge Medications      Continue These Medications      Instructions Start Date   acetaminophen 325 MG tablet  Commonly known as: TYLENOL   650 mg, Oral, Every 6 Hours PRN      albuterol sulfate  (90 Base) MCG/ACT inhaler  Commonly known as: PROVENTIL HFA;VENTOLIN HFA;PROAIR HFA   2 puffs, Inhalation, Every 4 Hours PRN      amLODIPine 10 MG tablet  Commonly known as: NORVASC   TAKE 1 TABLET BY MOUTH EVERY DAY      aspirin 81 MG chewable tablet   Take 81 mg by mouth daily.        budesonide-formoterol 160-4.5 MCG/ACT inhaler  Commonly known as: SYMBICORT   2 puffs, Inhalation, 2 Times Daily - RT      cyanocobalamin 1000 MCG/ML injection   1,000 mcg, Intramuscular, Once, Due on October 28       lisinopril 40 MG tablet  Commonly known as: PRINIVIL,ZESTRIL   40 mg, Oral, Daily      montelukast 10 MG tablet  Commonly known as: SINGULAIR   10 mg, Oral, Nightly      Nitrostat 0.4 MG SL tablet  Generic drug: nitroglycerin   No dose, route, or frequency recorded.      omeprazole 20 MG capsule  Commonly known as: priLOSEC   TAKE 1 CAPSULE BY MOUTH DAILY. REPLACES FAMOTIDINE      terazosin 5 MG capsule  Commonly known as: HYTRIN   5 mg, Oral, Nightly           Discharge Diet:     Diet Instructions     Diet: Dysphagia; Nectar / Syrup Thick Liquids; Pureed      Discharge Diet: Dysphagia    Fluid Consistency:  Nectar / Syrup Thick Liquids    Pureed Options: Pureed        Activity at Discharge:     Activity Instructions     Activity as Tolerated          Follow-up Appointments:    Additional Instructions for the Follow-ups that You Need to Schedule     Ambulatory Referral to Home Health   As directed      Face to Face Visit Date: 11/2/2020    Follow-up provider for Plan of Care?: I treated the patient in an acute care facility and will not continue treatment after discharge.    Follow-up provider: DEVIAK FOY [[id number]]    Reason/Clinical Findings: COPD, COVID 19 Pneumonia, Hypoxia    Describe mobility limitations that make leaving home difficult: Generalized weakness, Fall risk    Nursing/Therapeutic Services Requested: Skilled Nursing Physical Therapy Occupational Therapy    Skilled nursing orders: Medication education O2 instruction Mini-nebs COPD management Cardiopulmonary assessments    PT orders: Therapeutic exercise Gait Training Transfer training Strengthening    Weight Bearing Status: As Tolerated    Occupational orders: Activities of daily living Strengthening    Frequency: 1 Week 1           Follow-up Information     Devika Foy, RACHID Follow up in 1 week(s).    Specialty: Family Medicine  Contact information:  [address] [place] [address] [phone]                 Test Results Pending at Discharge:    None.       Sushil Shane MD  11/02/20  15:12 EST    Time: I spent 25 minutes on this discharge activity which included: face-to-face encounter with the patient, reviewing the data in the system, coordination of the care with the nursing staff as well as consultants, documentation, and entering orders.     Dictated utilizing Dragon dictation.

## 2020-11-02 NOTE — PROGRESS NOTES
Continued Stay Note  LUIS EDUARDO Merino     Patient Name: Noe Martel  MRN: [id number]  Today's Date: 11/2/2020    Admit Date: 10/23/2020    Discharge Plan     Row Name 11/02/20 1051       Plan    Plan  called [facility] [phone] no answer        Discharge Codes    No documentation.       Expected Discharge Date and Time     Expected Discharge Date Expected Discharge Time    Oct 31, 2020             Annabel Morin RN

## 2020-11-02 NOTE — PROGRESS NOTES
Continued Stay Note  [facility]     Patient Name: Noe Martel  MRN: [id number]  Today's Date: 11/2/2020    Admit Date: 10/23/2020    Discharge Plan     Row Name 11/02/20 1051       Plan    Plan  [facility]; wife stated son can transport but don't know if pt strong enough based on what she is told and very concerned; [facility] Matilda Molina @[phone], unknown extension and would not give, info given to  and they took message about possible home health and oxygen through [facility]; spoke with Annamarie in PT, stated Jeni working on patient and informed Dr. Shane of concerns; will continue to follow  12:14 EST  Spoke with Jeni/PT, stated pt mod/mas assist with transfers, called and spoke to wife/son, stated before patient got sick was able to walk self to bathroom with walker and feed self, etc.; stated would consider rehab facility to get him back on his feet, chose [facility] or [facility], if they will not accept can try [facility], since pt Covid positive   12:25 EST  Spoke to [facility] and stated would consider taking a positive Covid if they are 10 days from covid positive test, 3 days symptom free and on no medication for symptoms like fever reducer, decongestant, stool meds, etc.; informed dr. Shane  12:34 ESTsent consult sent to [facility], informed Corin, awaiting information from dr. Shane if meets their guidelines  13:45 EST  Received voicemail from son, stated him and his mother decided that they want to bring patient home instead of rehab; attempted to call back no answer; will [facility], awaiting return call;[facility] and stated need [facility] for Dr. Copeland; informed md will need order to fax to [phone],  to [facility]eland, for home health/o2  referral   15:36 EST  [facility] Shane informed   15:55  PANFILO PERSAUD/Bharathi called back, I informed her order should be there now, successful fax received, stated would look at but would be tomorrow before can confirm approval, given Mitali rebolledo        Discharge Codes    No documentation.       Expected Discharge Date and Time     Expected Discharge Date Expected Discharge Time    Oct 31, 2020             Annabel Morin RN

## 2020-11-02 NOTE — DISCHARGE PLACEMENT REQUEST
"Positive Covid pt dcing possibly rehab at [facility]  Annabel Morin  [phone]          Tahmina Priti JAMES (85 y.o. Male)     Date of Birth Social Security Number Address Home Phone MRN    1935  [address]   [facility] [address] [phone] [id number]    Judaism Marital Status          None        Admission Date Admission Type Admitting Provider Attending Provider Department, Room/Bed    10/23/20 Emergency Sushil Shane MD Pais, Roshan, MD [facility] SURG  3, 324/1    Discharge Date Discharge Disposition Discharge Destination                       Attending Provider: Sushil Shane MD    Allergies: Sulfa Antibiotics, Sulfur    Isolation: Enh Drop/Con, Contact Air   Infection: COVID (confirmed) (10/23/20)   Code Status: CPR    Ht: 175.3 cm (69\")   Wt: 79.9 kg (176 lb 2.4 oz)    Admission Cmt: None   Principal Problem: Acute respiratory failure with hypoxia (CMS/HCC) [J96.01]                 Active Insurance as of 10/23/2020     Primary Coverage     Payor Plan Insurance Group Employer/Plan Group    HUMANA MEDICARE REPLACEMENT HUMANA MEDICARE REPLACEMENT [id number]     Payor Plan Address Payor Plan Phone Number Payor Plan Fax Number Effective Dates    [address] [address] [phone]  1/1/2013 - None Entered    [facility] [id number]       Subscriber Name Subscriber Birth Date Member ID       PRITI ZAVALA 1935 [id number]           Secondary Coverage     Payor Plan Insurance Group Employer/Plan Group    [facility] [place] DEPT [id number]      Payor Plan Address Payor Plan Phone Number Payor Plan Fax Number Effective Dates    [facility] OFFICE OF COMMUNITY CARE [phone]  10/21/2020 - None Entered    [address] [address]       [facility] [address]       Subscriber Name Subscriber Birth Date Member ID       PRITI ZAVALA ERIKA 1935 [id number]                 Emergency Contacts      (Rel.) Home Phone Work Phone Mobile Phone    TahminaOral (Son) [phone] " -- --    PATRICK MARTEL (Spouse) [phone] -- --    Jalen Martel (Son) -- -- [phone]               History & Physical      Miguel A Santos DO at 10/26/20 0713                [facility]    Name:  Noe Martel   Age:  85 y.o.  Sex:  male  :  1935  MRN:  [id number]   Visit Number:  [id number]  Admission Date:  10/23/2020  Date Of Service:  10/26/20  Primary Care Physician:  Norma Reyes APRN     LOS: 3 days :  Patient Care Team:  Norma Reyes APRN as PCP - General  Provider, No Known as PCP - Family Medicine:    Chief Complaint:      Respiratory failure    Subjective / Interval History:     Patient is seen and evaluated today at bedside.  PPE was worn.  He appears more alert and is conversant.  He has been off of his BiPAP.  He denies any pain or breathing difficulties.       HX:  85 male history of CAD, COPD, HTN, HLD who was brought to the ED by family after they noted that he was progressively weak and aphasic today.    Patient son had recently tested positive for COVID-19. Evaluation upon arrival to the hospital indicated multifocal pneumonia as seen on chest CT. CT of the head showed atrophy with no acute intracranial process.  COVID-19 testing was positive. Patient also had evidence of SANG, renal consulted.  He was admitted to the ICU and initiated on Decadron and antibiotics.    Review of Systems:     A full 10 point review of systems was performed and all pertinent positives and negatives are noted in the HPI.      Vital Signs:    Temp:  [97.3 °F (36.3 °C)-97.9 °F (36.6 °C)] 97.7 °F (36.5 °C)  Heart Rate:  [] 108  Resp:  [13-21] 18  BP: ()/(49-84) 92/73    Intake and output:    I/O last 3 completed shifts:  In: 4047.1 [P.O.:240; I.V.:3807.1]  Out: 1250 [Urine:1250]  No intake/output data recorded.    Physical Examination: Examined again today  General: No acute distress, resting in bed  HEENT: EOMI, NC/AT, nasal cannula  Heart: Regular  on the monitor  Lungs: Nonlabored  Abdomen: Soft, nondistended, nontender  Extremities: No edema, or cyanosis  Neurological: Awake, alert, oriented to person, place, not time, moves extremities  Psychological: Good eye contact  Skin: warm, dry    Laboratory results:    Results from last 7 days   Lab Units 10/26/20  0549 10/25/20  0535 10/24/20  0431 10/23/20  1531   SODIUM mmol/L 146* 144 151* 148*   POTASSIUM mmol/L 4.4 4.1 4.8 5.3*   CHLORIDE mmol/L 114* 112* 116* 109*   CO2 mmol/L 17.8* 21.4* 18.8* 22.8   BUN mg/dL 73* 101* 110* 105*   CREATININE mg/dL 1.67* 2.31* 2.82* 3.08*   CALCIUM mg/dL 7.6* 7.5* 7.7* 8.7   BILIRUBIN mg/dL 0.6  --  0.5 1.0   ALK PHOS U/L 43  --  43 50   ALT (SGPT) U/L 38  --  53* 64*   AST (SGOT) U/L 31  --  59* 62*   GLUCOSE mg/dL 201* 224* 249* 164*     Results from last 7 days   Lab Units 10/26/20  0549 10/25/20  0535 10/24/20  0432   WBC 10*3/mm3 18.53* 18.34* 18.03*   HEMOGLOBIN g/dL 14.9 13.7 15.3   HEMATOCRIT % 45.0 43.8 47.6   PLATELETS 10*3/mm3 151 178 176         Results from last 7 days   Lab Units 10/23/20  1531   TROPONIN T ng/mL 0.027     Results from last 7 days   Lab Units 10/23/20  1720   BLOODCX  No growth at 2 days     Results from last 7 days   Lab Units 10/23/20  1549   PH, ARTERIAL pH units 7.469   PO2 ART mm Hg 157.0*   PCO2, ARTERIAL mm Hg 28.7*   HCO3 ART mmol/L 20.8*       I have reviewed the patient's laboratory results.    Radiology results:    Imaging Results (Last 24 Hours)     ** No results found for the last 24 hours. **          I have reviewed the patient's radiology reports.    Medication Review:     I have reviewed the patients active and prn medications.       Acute respiratory failure with hypoxia (CMS/HCC)    Hypertension    CAD (coronary artery disease)    GERD (gastroesophageal reflux disease)    COVID-19    Sepsis, unspecified organism (CMS/HCC)      Assessment:    Sepsis  Acute hypoxemic respiratory failure POA  COVID-19 pneumonia  Superimposed  "community-acquired pneumonia with risk factors for MDR gram-negative raheem POA  Acute metabolic encephalopathy POA  Acute kidney injury versus CKD  Elevated troponin  Chronic: COPD, CAD s/p PCI, HTN, HLD, GERD    Plan:     -Clinically improving, now off BiPAP, on 4 L nasal cannula, mentation has improved as well; stepped out to telemetry  -Continue IV Rocephin, Levaquin, decrease Decadron to daily dosing  -Since his renal function has improved, may consider remdesivir repeat doses, await nephrology recommendations  -supplemental oxygen as necessary to maintain appropriate saturation >88%  -Speech eval  -A1c 6.2, decrease Levemir  -I have reviewed the meds, labs, imaging, and discussed case with nursing staff at bedside; Lovenox for DVT PPX    Miguel A Santos DO  10/26/20  07:14 EDT    Dictated utilizing Dragon dictation.      Electronically signed by Miguel A Santos DO at 10/26/20 0776     Miguel A Santos DO at 10/23/20 2764              [facility]      Name:  Noe Martel   Age:  85 y.o.  Sex:  male  :  1935  MRN:  [id number]   Visit Number:  [id number]  Admission Date:  10/23/2020  Date Of Service:  10/23/20  Primary Care Physician:  Norma Reyes APRN    Chief Complaint:     \"He was so weak and just would not talk today\"    History Of Presenting Illness:      85 male history of CAD, COPD, HTN, HLD who was brought to the ED by family after they noted that he was progressively weak and aphasic today.  The son has reportedly been sick as well and diagnosed with Covid recently.  The patient has been feeling bad for the past week and not been walking.  Today, when he was not talking, the family grew concerned and brought into the ER.  He had a SPO2 of 68% on room air and 90% with nonrebreather.  At the time of my evaluation, he is on BiPAP requiring an FiO2 of 70%  backup rate 16.  The patient is unable to contribute reliable history, as he falls back asleep 1 " physically stimulated.    Review Of Systems:     Unable to obtain due to altered mental status     Past Medical History:    Past Medical History:   Diagnosis Date   • BPH (benign prostatic hyperplasia)    • CAD (coronary artery disease)     status post drug-eluting stent deployment in the ostium of the right coronary artery and balloon angioplasty of the obtuse marginal branch of the left circumflex, 07/29/2015.  Ejection fraction 60%.   • Chronic back pain    • COPD (chronic obstructive pulmonary disease) (CMS/[facility])    • Dyslipidemia      untreated.    • GERD (gastroesophageal reflux disease)    • Hyperlipidemia    • Hypertension        Past Surgical history:    Past Surgical History:   Procedure Laterality Date   • CARDIAC SURGERY      STENT PLACEMENT   • CHOLECYSTECTOMY     • HERNIA REPAIR      bilateral       Social History:    Social History     Socioeconomic History   • Marital status:      Spouse name: Not on file   • Number of children: Not on file   • Years of education: Not on file   • Highest education level: Not on file   Occupational History     Employer: RETIRED   Tobacco Use   • Smoking status: Never Smoker   Substance and Sexual Activity   • Alcohol use: No       Family History:    History reviewed. No pertinent family history.    Allergies:      Sulfa antibiotics and Sulfur    Home Medications:    Prior to Admission Medications     Prescriptions Last Dose Informant Patient Reported? Taking?    acetaminophen (TYLENOL) 325 MG tablet   Yes Yes    Take 650 mg by mouth Every 6 (Six) Hours As Needed for Mild Pain .    albuterol sulfate  (90 Base) MCG/ACT inhaler   Yes Yes    Inhale 2 puffs Every 4 (Four) Hours As Needed for Wheezing.    amLODIPine (NORVASC) 10 MG tablet   Yes Yes    TAKE 1 TABLET BY MOUTH EVERY DAY    aspirin 81 MG chewable tablet   Yes Yes    Take 81 mg by mouth daily.      budesonide-formoterol (SYMBICORT) 160-4.5 MCG/ACT inhaler   Yes Yes    Inhale 2 puffs 2 (Two) Times a  Day.    lisinopril (PRINIVIL,ZESTRIL) 40 MG tablet   Yes Yes    Take 40 mg by mouth Daily.    montelukast (SINGULAIR) 10 MG tablet   Yes Yes    Take 10 mg by mouth Every Night.    nitroglycerin (NITROSTAT) 0.4 MG SL tablet   Yes Yes    omeprazole (PriLOSEC) 20 MG capsule   Yes Yes    TAKE 1 CAPSULE BY MOUTH DAILY. REPLACES FAMOTIDINE    terazosin (HYTRIN) 5 MG capsule   Yes Yes    Take 5 mg by mouth Every Night.             ED Medications:    Medications   ipratropium-albuterol (DUO-NEB) nebulizer solution 3 mL (has no administration in time range)   enoxaparin (LOVENOX) syringe 40 mg (has no administration in time range)   dexamethasone (DECADRON) injection 6 mg (has no administration in time range)   remdesivir 200 mg in sodium chloride 0.9 % 250 mL IVPB (powder vial) (has no administration in time range)   Pharmacy Consult - Remdesivir (has no administration in time range)   methylPREDNISolone sodium succinate (SOLU-Medrol) injection 125 mg (125 mg Intravenous Given 10/23/20 1547)   sodium chloride 0.9 % bolus 1,000 mL (0 mL Intravenous Stopped 10/23/20 1759)   AZITHROMYCIN 500 MG/250 ML 0.9% NS IVPB (vial-mate) (500 mg Intravenous New Bag 10/23/20 1715)   cefTRIAXone (ROCEPHIN) IVPB 1 g/50ml dextrose (premix) (0 g Intravenous Stopped 10/23/20 1759)       Vital Signs:    Temp:  [100.1 °F (37.8 °C)] 100.1 °F (37.8 °C)  Heart Rate:  [105-117] 105  Resp:  [30-40] 30  BP: (111-113)/(76-80) 113/76        10/23/20  1512   Weight: 81.6 kg (180 lb)       Body mass index is 26.58 kg/m².    Physical Exam:  General: Lethargic appearing, asleep, arousable, labored breathing  HEENT:  NC/AT, BiPAP  Heart: Tachycardic  Lungs: Labored on FiO2 70% BiPAP  Abdomen: Soft, nondistended, nontender  Extremities: No edema, or cyanosis  Neurological: Arousable to physical stimulation, falls back asleep, unable to discern orientation  Psychological: Unable to assess  Skin: warm, dry    Laboratory data:    I have reviewed the labs done in  the emergency room.    Results from last 7 days   Lab Units 10/23/20  1531   SODIUM mmol/L 148*   POTASSIUM mmol/L 5.3*   CHLORIDE mmol/L 109*   CO2 mmol/L 22.8   BUN mg/dL 105*   CREATININE mg/dL 3.08*   CALCIUM mg/dL 8.7   BILIRUBIN mg/dL 1.0   ALK PHOS U/L 50   ALT (SGPT) U/L 64*   AST (SGOT) U/L 62*   GLUCOSE mg/dL 164*     Results from last 7 days   Lab Units 10/23/20  1531   WBC 10*3/mm3 16.28*   HEMOGLOBIN g/dL 17.0   HEMATOCRIT % 52.5*   PLATELETS 10*3/mm3 227         Results from last 7 days   Lab Units 10/23/20  1531   TROPONIN T ng/mL 0.027     Results from last 7 days   Lab Units 10/23/20  1531   PROBNP pg/mL 988.6         Results from last 7 days   Lab Units 10/23/20  1531   LIPASE U/L 18     Results from last 7 days   Lab Units 10/23/20  1549   PH, ARTERIAL pH units 7.469   PO2 ART mm Hg 157.0*   PCO2, ARTERIAL mm Hg 28.7*   HCO3 ART mmol/L 20.8*     Results from last 7 days   Lab Units 10/23/20  1543   COLOR UA  Dark Yellow*   GLUCOSE UA  Negative   KETONES UA  Negative   LEUKOCYTES UA  Negative   PH, URINE  <=5.0   BILIRUBIN UA  Small (1+)*   UROBILINOGEN UA  1.0 E.U./dL     Pain Management Panel     There is no flowsheet data to display.              EKG:      Reviewed by myself reveals sinus tachycardia, rate 121, QTc 346, no acute ST segment or ischemic changes    Radiology:    Imaging Results (Last 72 Hours)     Procedure Component Value Units Date/Time    CT Chest Without Contrast [[id number]] Collected: 10/23/20 1752     Updated: 10/23/20 1753    Narrative:      FINAL REPORT    TECHNIQUE:  Axial imaging of the chest was obtained without contrast.  Reformatted images were also obtained and reviewed.This study  was performed with techniques to keep radiation doses as low as  reasonably achievable, (ALARA). Individualized dose reduction  technique using automated exposure control or adjustment of mA  and/or kV according to the patient's size were employed.    CLINICAL HISTORY:  +  covid    FINDINGS:  There is no axillary adenopathy. There is no hilar or  mediastinal mass or adenopathy. Heart size is normal. There is  no pericardial or pleural effusion. Limited images of the upper  abdomen are unremarkable.  There is left lower lobe  consolidation.  Patchy ground glass opacities are seen within  the upper lobes.      Impression:      Multifocal pneumonia which may be viral or bacterial    Authenticated by REBECCA West MD on 10/23/2020 05:52:27 PM    CT Head Without Contrast [[id number]] Collected: 10/23/20 1743     Updated: 10/23/20 1744    Narrative:      FINAL REPORT    TECHNIQUE:  Axial images were performed through the brain.This study was  performed with techniques to keep radiation doses as low as  reasonably achievable, (ALARA). Individualized dose reduction  techniques using automated exposure control or adjustment of mA  and/or kV according to the patient''s size were employed.    CLINICAL HISTORY:  aphasia, difficulty walking    FINDINGS:  There is advanced global atrophy and chronic microvascular  ischemic change.   The ventricles are normal in size for the  degree of atrophy.  There is no extra-axial fluid or midline  shift.  There is no evidence of acute hemorrhage or mass.      Impression:      Atrophy.  No acute intracranial process.    Authenticated by REBECCA West MD on 10/23/2020 05:43:38 PM    XR Chest 1 View [[id number]] Resulted: 10/23/20 1706     Updated: 10/23/20 1706            Acute respiratory failure with hypoxia (CMS/HCC)    Hypertension    CAD (coronary artery disease)    GERD (gastroesophageal reflux disease)    COVID-19    Sepsis, unspecified organism (CMS/HCC)    I personally reviewed the CT chest and CXR from the ED which revealed bilateral infiltrates consistent with pneumonia  I personally discussed the case with the ED physician    Assessment:  Sepsis  Community-acquired pneumonia with risk factors for MDR gram-negative raheem POA  COVID-19  pneumonia  Acute hypoxemic respiratory failure  Acute metabolic encephalopathy  Acute kidney injury versus CKD  Elevated troponin  Chronic: COPD, CAD s/p PCI, HTN, HLD, GERD    Plan:    -I do not believe he is a candidate for the 30 cc/kg bolus given his severe bilateral infiltrates and risk of developing ARDS  -Cultures have been obtained  -Given he is being admitted to the ICU, start IV Rocephin and Levaquin, Decadron, Lovenox  as an antithrombotic  -Tough call with remdesivir given his CKD, but will try a one-time dose for now and monitor his renal function closely  -Consult nephrology  -He is very high risk for further clinical deterioration and decline; prognosis is guarded.  Family initially was indicating that he would like to be DNR, and change her mind to full code for now      Miguel A Santos DO  10/23/20  19:08 EDT    Dictated utilizing Dragon dictation.      Electronically signed by Miguel A Santos DO at 10/23/20 1965       Oxygen Therapy (last 2 days)     Date/Time   SpO2   Device (Oxygen Therapy)   Flow (L/min)   Oxygen Concentration (%)   ETCO2 (mmHg)    11/02/20 0845   --   room air   --   --   --    11/02/20 0344   92   --   --   --   --    11/02/20 0300   92   --   --   --   --    11/02/20 0250   92   --   --   --   --    11/02/20 0240   90   --   --   --   --    11/02/20 0230   94   --   --   --   --    11/02/20 0220   90   --   --   --   --    11/02/20 0210   92   --   --   --   --    11/02/20 0200   (!) 89   room air   --   --   --    11/02/20 0150   (!) 89   --   --   --   --    11/02/20 0140   92   --   --   --   --    11/02/20 0130   92   --   --   --   --    11/02/20 0120   93   --   --   --   --    11/02/20 0110   93   --   --   --   --    11/02/20 0100   95   --   --   --   --    11/02/20 0042   93   --   --   --   --    11/01/20 2015   --   room air   --   --   --    11/01/20 1955   94   --   --   --   --    11/01/20 1611   95   --   --   --   --    11/01/20 1059   96   --   --   --   --     11/01/20 0831   94   --   --   --   --    11/01/20 0800   --   room air   --   --   --    11/01/20 0431   97   --   --   --   --    11/01/20 0022   94   room air   --   --   --    10/31/20 2015   --   room air   --   --   --    10/31/20 2000   96   --   --   --   --    10/31/20 1637   92   --   --   --   --    10/31/20 1147   96   --   --   --   --    10/31/20 0800   --   nasal cannula   2   --   --    10/31/20 0620   90   nasal cannula   2   --   --    10/31/20 0600   --   room air   --   --   --              Current Facility-Administered Medications   Medication Dose Route Frequency Provider Last Rate Last Dose   • albuterol sulfate HFA (PROVENTIL HFA;VENTOLIN HFA;PROAIR HFA) inhaler 2 puff  2 puff Inhalation Q4H PRN Miguel A Santos DO       • aspirin chewable tablet 81 mg  81 mg Oral Daily Miguel A Santos DO   81 mg at 11/02/20 0846   • budesonide-formoterol (SYMBICORT) 160-4.5 MCG/ACT inhaler 2 puff  2 puff Inhalation BID - RT Miguel A Santos DO   2 puff at 11/02/20 0846   • dextrose (D50W) 25 g/ 50mL Intravenous Solution 25 g  25 g Intravenous Q15 Min PRN Miguel A Santos, DO       • dextrose (GLUTOSE) oral gel 1 tube  1 tube Oral Q15 Min PRN Miguel A Santos,        • enoxaparin (LOVENOX) syringe 40 mg  40 mg Subcutaneous Q24H Miguel A Santos DO   40 mg at 11/01/20 1804   • glucagon (human recombinant) (GLUCAGEN DIAGNOSTIC) injection 1 mg  1 mg Subcutaneous PRN Miguel A Santos, DO       • insulin aspart (novoLOG) injection 0-7 Units  0-7 Units Subcutaneous TID AC Miguel A Santos DO   2 Units at 10/28/20 1150   • mirtazapine (REMERON) tablet 15 mg  15 mg Oral Nightly Magadlene Reis, DO   15 mg at 11/01/20 2128   • montelukast (SINGULAIR) tablet 10 mg  10 mg Oral Nightly Miguel A Santos DO   10 mg at 11/01/20 2128   • nystatin (MYCOSTATIN) 416062 UNIT/ML suspension 500,000 Units  5 mL Oral 4x Daily Magdalene Reis DO   500,000 Units at 11/02/20 0846   • pantoprazole (PROTONIX) EC tablet 40 mg  40 mg Oral ANKITA Santos  Umar, DO   40 mg at 20 0621   • Pharmacy Consult - Remdesivir   Does not apply Continuous PRN Magdalene Reis DO       • PRO-STAT oral liquid 30 mL  30 mL Oral BID Magdalene Reis, DO   30 mL at 20 0846   • sodium chloride 0.9 % flush 10 mL  10 mL Intravenous Q12H Danielle, Umar, DO   10 mL at 20 0846   • sodium chloride 0.9 % flush 10 mL  10 mL Intravenous PRN Danielle, Umar, DO   10 mL at 10/26/20 0856   • terazosin (HYTRIN) capsule 5 mg  5 mg Oral Nightly Danielle, Umar, DO   5 mg at 20 2128        Physician Progress Notes (last 48 hours) (Notes from 10/31/20 1232 through 20 1232)      Magdalene Reis DO at 20 1048                [facility]    Name:  Noe Martel   Age:  85 y.o.  Sex:  male  :  1935  MRN:  [id number]   Visit Number:  [id number]  Admission Date:  10/23/2020  Date Of Service:  20  Primary Care Physician:  Devika Foy APRN     LOS: 9 days :  Patient Care Team:  Devika Foy APRN as PCP - General (Family Medicine):    Chief Complaint:      Follow-up on renal failure, COVID-19, dehydration, weakness    Subjective / Interval History:     Patient with no significant changes noted overnight.  Per nursing staff, has had increased oral intake.  Was able to do more with physical therapy with only 1 person assist yesterday.  He has no acute concerns.  He denies any shortness of breath.  He has had issues with incontinence for a while he states.  Denies any fevers or chills.    The patient is an 85-year-old gentleman with multiple medical comorbidities including hypertension, HLD, COPD, CAD who had been admitted on 10/23/2020 with multiple complaints including weakness, Covid-19, aphasia.  His initial work-up in the emergency room demonstrated mild hypoxia with multifocal pneumonia on chest CT.  He had a negative head CT for any acute intracranial process.  COVID-19 testing was positive.  He  had no evidence of significant acute kidney injury.  Initially admitted to the ICU and started on antibiotics in addition to Decadron.    Nephrology was consulted due to his degree of renal failure.  He was primarily managed with IV fluid hydration with much improvement in renal function over the past few days.  He was able to come out of the ICU and is currently in the Covid unit on the third floor.  His oxygen saturations have overall been stable.  He did complete a full course of remdesivir which completed on 10/30/2020.  He has been maintained on aspirin in addition to Lovenox.  Has slowly improved with physical therapy particularly over the last 24 to 48 hours.  His oral intake is somewhat better.  Was started on Remeron yesterday.    Review of Systems:     General ROS: Patient denies any fevers, chills or loss of consciousness.  Respiratory ROS: Denies cough or shortness of breath.  Cardiovascular ROS: Denies chest pain or palpitations. No history of exertional chest pain.  Gastrointestinal ROS: Denies nausea and vomiting. Denies any abdominal pain. No diarrhea.  Neurological ROS: Denies any focal weakness. No loss of consciousness. Denies any numbness.  Dermatological ROS: Denies any redness or pruritis.    Vital Signs:    Temp:  [96.7 °F (35.9 °C)-98.3 °F (36.8 °C)] 98.3 °F (36.8 °C)  Heart Rate:  [] 103  Resp:  [16-18] 18  BP: ()/(59-90) 97/59    Intake and output:    I/O last 3 completed shifts:  In: 1318 [P.O.:240; I.V.:1078]  Out: -   I/O this shift:  In: 60 [P.O.:60]  Out: -     Physical Examination:    General Appearance:  Alert and cooperative, not in any acute distress.  Generally frail appearing elderly male.   Head:  Atraumatic and normocephalic, without obvious abnormality.   Eyes:          PERRLA, conjunctivae and sclerae normal, no Icterus. No pallor. Extraocular movements are within normal limits.   Neck: Supple, trachea midline, no thyromegaly.   Lungs:   Breath sounds heard  bilaterally equally.  No crackles or wheezing. No Pleural rub or bronchial breathing.   Heart:  Normal S1 and S2, no murmur, no gallop, no rub. No JVD   Abdomen:   Normal bowel sounds, no masses, no organomegaly. Soft, non-tender, non-distended, no guarding, no rebound tenderness.   Extremities: Moves all extremities well, no edema, no cyanosis, no clubbing.   Skin: No bleeding, bruising or rash.   Neurologic: Awake, alert and oriented times 3. Moves all 4 extremities equally.  Generalized weakness again noted.     Laboratory results:    Results from last 7 days   Lab Units 11/01/20  0750 10/31/20  0745 10/30/20  0655 10/29/20  0600   SODIUM mmol/L 141 138 137 138   POTASSIUM mmol/L 4.2 4.3 4.4 4.2   CHLORIDE mmol/L 107 106 105 109*   CO2 mmol/L 24.0 23.2 22.3 23.4   BUN mg/dL 40* 24* 31* 37*   CREATININE mg/dL 1.06 1.06 1.09 1.03   CALCIUM mg/dL 7.3* 7.4* 7.2* 7.2*   BILIRUBIN mg/dL  --  0.7 0.6 0.5   ALK PHOS U/L  --  46 47 37*   ALT (SGPT) U/L  --  36 31 28   AST (SGOT) U/L  --  33 32 23   GLUCOSE mg/dL 96 94 91 94     Results from last 7 days   Lab Units 11/01/20  0750 10/31/20  0725 10/30/20  0655   WBC 10*3/mm3 11.88* 9.60 9.78   HEMOGLOBIN g/dL 13.5 13.7 13.3   HEMATOCRIT % 40.7 40.6 40.7   PLATELETS 10*3/mm3 196 187 169         Results from last 7 days   Lab Units 10/27/20  1250   TROPONIN T ng/mL <0.010               I have reviewed the patient's laboratory results.    Radiology results:    Imaging Results (Last 24 Hours)     ** No results found for the last 24 hours. **          I have reviewed the patient's radiology reports.    Medication Review:     I have reviewed the patients active and prn medications.       Acute respiratory failure with hypoxia (CMS/HCC)    Hypertension    CAD (coronary artery disease)    GERD (gastroesophageal reflux disease)    COVID-19    Sepsis, unspecified organism (CMS/HCC)      Assessment:    Sepsis  Acute hypoxemic respiratory failure POA  COVID-19 pneumonia  Superimposed  community-acquired pneumonia with risk factors for MDR gram-negative raheem POA  Acute metabolic encephalopathy POA  Acute kidney injury versus CKD  Elevated troponin  Chronic: COPD, CAD s/p PCI, HTN, HLD, GERD    Plan:    Patient's vitals reviewed and he continues to remain stable on low oxygen requirements.  Rest of his lab work-up is largely unremarkable.  Albumin noted to be low still.  IV fluids were discontinued yesterday.  Was started on appetite stimulant yesterday evening.  Continue to work with physical therapy today.  Continue on VTE prophylaxis.    Anticipate if patient's intake can improve and he can do better with therapy, he can potentially go home as early as tomorrow with home health with physical therapy.  Family has talked with case management and seems agreeable to this plan of care.    Magdalene Reis DO  20  10:48 EST    Dictated utilizing Dragon dictation.    Electronically signed by Magdalene Reis DO at 20 1052       Consult Notes (last 48 hours) (Notes from 10/31/20 1232 through 20 1232)    No notes of this type exist for this encounter.          Physical Therapy Notes (last 48 hours) (Notes from 10/31/20 1232 through 20 1232)      Angela Mcelroy PT at 20 1115  Version 1 of 1         Problem: Adult Inpatient Plan of Care  Goal: Plan of Care Review  Recent Flowsheet Documentation  Taken 2020 1115 by Angela Mcelroy PT  Progress: no change  Plan of Care Reviewed With: patient  Outcome Summary: PT treatment completed this am with pt very lethargic and inconsistent with LE ther ex (see flowsheet for details) due to falling asleep during ther ex. Held sitting on EOB and transfers due to pt's decreased alertness/fatigue. Cont with current PT POC.       Electronically signed by Angela Mcelroy PT at 20 1150     Angela Mcelroy PT at 20 1115  Version 1 of 1         Patient Name: Noe Martel  : 1935    MRN: [id number]                               Today's Date: 11/1/2020       Admit Date: 10/23/2020    Visit Dx:     ICD-10-CM ICD-9-CM   1. COVID-19  U07.1 079.89   2. Pneumonia of both lungs due to infectious organism, unspecified part of lung  J18.9 483.8   3. Acute on chronic respiratory failure with hypoxia (CMS/[facility])  J96.21 518.84     799.02   4. Acute renal failure, unspecified acute renal failure type (CMS/[facility])  N17.9 584.9   5. Transaminitis  R74.01 790.4     Patient Active Problem List   Diagnosis   • Benign prostatic hyperplasia   • Hypertension   • CAD (coronary artery disease)   • Dyslipidemia   • Chronic back pain   • GERD (gastroesophageal reflux disease)   • COPD (chronic obstructive pulmonary disease) (CMS/[facility])   • COVID-19   • Acute respiratory failure with hypoxia (CMS/[facility])   • Sepsis, unspecified organism (CMS/[facility])     Past Medical History:   Diagnosis Date   • BPH (benign prostatic hyperplasia)    • CAD (coronary artery disease)     status post drug-eluting stent deployment in the ostium of the right coronary artery and balloon angioplasty of the obtuse marginal branch of the left circumflex, 07/29/2015.  Ejection fraction 60%.   • Chronic back pain    • COPD (chronic obstructive pulmonary disease) (CMS/[facility])    • Dyslipidemia      untreated.    • GERD (gastroesophageal reflux disease)    • Hyperlipidemia    • Hypertension    • Impaired functional mobility, balance, gait, and endurance      Past Surgical History:   Procedure Laterality Date   • CARDIAC SURGERY      STENT PLACEMENT   • CHOLECYSTECTOMY     • HERNIA REPAIR      bilateral     General Information     Row Name 11/01/20 1115          Physical Therapy Time and Intention    Document Type  therapy note (daily note)  -     Mode of Treatment  physical therapy  -TW     Row Name 11/01/20 1115          Cognition    Orientation Status (Cognition)  oriented to;person;situation  -TW     Row Name 11/01/20 1115          Safety Issues, Functional Mobility    Comment, Safety  Issues/Impairments (Mobility)  For PT treatment this date pt kept falling asleep during ther ex and due to decreased alertness therapist held sitting EOB or standing.  -TW       User Key  (r) = Recorded By, (t) = Taken By, (c) = Cosigned By    Initials Name Provider Type    Angela Lynn PT Physical Therapist        Mobility     Row Name 11/01/20 [facility]5          Bed Mobility    Supine-Sit [place] (Bed Mobility)  unable to assess  -TW     Sit-Supine [place] (Bed Mobility)  unable to assess  -TW     Row Name 11/01/20 [facility]5          Bed-Chair Transfer    Bed-Chair [place] (Transfers)  unable to assess  -TW     Row Name 11/01/20 [facility]          Sit-Stand Transfer    Sit-Stand [place] (Transfers)  unable to assess  -TW       User Key  (r) = Recorded By, (t) = Taken By, (c) = Cosigned By    Initials Name Provider Type    Angela Lynn PT Physical Therapist        Obj/Interventions     Row Name 11/01/20 [facility]5          Motor Skills    Therapeutic Exercise  knee;hip;ankle  -     Row Name 11/01/20 [facility]5          Hip (Therapeutic Exercise)    Hip (Therapeutic Exercise)  -- B hip PROM to AAROM heelslides, hip abd/add x 10  -     Row Name 11/01/20 [facility]5          Knee (Therapeutic Exercise)    Knee (Therapeutic Exercise)  -- Pt kept falling asleep during quad sets performing 5 B; pt able to perform SAQs B partial range with limited eccentric control.  -     Row Name 11/01/20 1115          Ankle (Therapeutic Exercise)    Ankle (Therapeutic Exercise)  -- B PROM for dorsiflexion with dorsiflexion stretch B x 5 post 10 PROM  -TW       User Key  (r) = Recorded By, (t) = Taken By, (c) = Cosigned By    Initials Name Provider Type    Angela Lynn PT Physical Therapist        Goals/Plan    No documentation.       Clinical Impression     Row Name 11/01/20 1115          Pain Scale: Numbers Pre/Post-Treatment    Pretreatment Pain Rating  0/10 - no pain  -TW     Posttreatment Pain Rating  0/10 - no pain  -TW      Row Name 11/01/20 1115          Plan of Care Review    Plan of Care Reviewed With  patient  -TW     Progress  no change  -TW     Outcome Summary  PT treatment completed this am with pt very lethargic and inconsistent with LE ther ex (see flowsheet for details) due to falling asleep during ther ex. Held sitting on EOB and transfers due to pt's decreased alertness/fatigue. Cont with current PT POC.  -TW     Row Name 11/01/20 1115          Vital Signs    Pretreatment Heart Rate (beats/min)  90  -TW     Posttreatment Heart Rate (beats/min)  95  -TW     Pre SpO2 (%)  94  -TW     O2 Delivery Pre Treatment  -- 4 L  -TW     Intra SpO2 (%)  93  -TW     O2 Delivery Intra Treatment  supplemental O2 4 L  -TW     Pre Patient Position  Supine  -TW     Intra Patient Position  Supine  -TW     Post Patient Position  Supine  -TW     Row Name 11/01/20 1115          Positioning and Restraints    Pre-Treatment Position  in bed  -TW     Post Treatment Position  bed  -TW     In Bed  supine;call light within reach;encouraged to call for assist;side rails up x3;legs elevated  -TW       User Key  (r) = Recorded By, (t) = Taken By, (c) = Cosigned By    Initials Name Provider Type    TW Angela Mcelroy, PT Physical Therapist        Outcome Measures     Row Name 11/01/20 1115          How much help from another person do you currently need...    Turning from your back to your side while in flat bed without using bedrails?  3  -TW     Moving from lying on back to sitting on the side of a flat bed without bedrails?  3  -TW     Moving to and from a bed to a chair (including a wheelchair)?  3  -TW     Standing up from a chair using your arms (e.g., wheelchair, bedside chair)?  2  -TW     Climbing 3-5 steps with a railing?  1  -TW     Row Name 11/01/20 1115          Functional Assessment    Outcome Measure Options  AM-PAC 6 Clicks Basic Mobility (PT)  -TW       User Key  (r) = Recorded By, (t) = Taken By, (c) = Cosigned By    Initials Name Provider  Type    TW Angela Mcelroy, PT Physical Therapist        Physical Therapy Education                 Title: PT OT SLP Therapies (Done)     Topic: Physical Therapy (Done)     Point: Mobility training (Done)     Learning Progress Summary           Patient Acceptance, D,E, DU,NR by  at 10/31/2020 1022    Comment: Pt education for LE ther ex technique with pt needing visual cues to perform correctly. Pt education also with rolling walker for transfer technique    Acceptance, E,TB, VU by  at 10/29/2020 1514    Comment: Purpose of PT; ther ex for HEP.    Acceptance, E,TB, VU by  at 10/28/2020 1600    Comment: Purpose of PT/POC.                   Point: Home exercise program (Done)     Learning Progress Summary           Patient Acceptance, E,D, DU,NR by  at 11/1/2020 1115    Comment: Pt education on LE ther ex technique. Pt falling asleep during session and will need reinforcement of ther ex.    Acceptance, D,E, DU,NR by  at 10/31/2020 1022    Comment: Pt education for LE ther ex technique with pt needing visual cues to perform correctly. Pt education also with rolling walker for transfer technique    Acceptance, E,TB, VU by  at 10/29/2020 1514    Comment: Purpose of PT; ther ex for HEP.    Acceptance, E,TB, VU by  at 10/28/2020 1600    Comment: Purpose of PT/POC.                   Point: Precautions (Done)     Learning Progress Summary           Patient Acceptance, E,TB, VU by  at 10/29/2020 1514    Comment: Purpose of PT; ther ex for HEP.    Acceptance, E,TB, VU by  at 10/28/2020 1600    Comment: Purpose of PT/POC.                               User Key     Initials Effective Dates Name Provider Type Discipline     04/03/18 -  Jeni Gracia, PT Physical Therapist PT     03/26/19 -  Angela Mcelroy PT Physical Therapist PT              PT Recommendation and Plan     Plan of Care Reviewed With: patient  Progress: no change  Outcome Summary: PT treatment completed this am with pt very lethargic and  inconsistent with LE ther ex (see flowsheet for details) due to falling asleep during ther ex. Held sitting on EOB and transfers due to pt's decreased alertness/fatigue. Cont with current PT POC.     Time Calculation:   PT Charges     Row Name 11/01/20 1115             Time Calculation    Start Time  1104  -TW      Stop Time  1115  -TW      Time Calculation (min)  11 min  -TW         Timed Charges    72700 - PT Therapeutic Exercise Minutes  11  -TW        User Key  (r) = Recorded By, (t) = Taken By, (c) = Cosigned By    Initials Name Provider Type    TW Angela Mcelroy PT Physical Therapist        Therapy Charges for Today     Code Description Service Date Service Provider Modifiers Qty    98650012700 HC PT NEUROMUSC RE EDUCATION EA 15 MIN 10/31/2020 Angela Mcelroy PT GP 1    48402374210 HC PT THER PROC EA 15 MIN 10/31/2020 Angela Mcelroy, PT GP 1    24629968525 HC PT THER PROC EA 15 MIN 11/1/2020 Angela Mcelroy PT GP 1          PT G-Codes  Outcome Measure Options: AM-PAC 6 Clicks Basic Mobility (PT)  AM-PAC 6 Clicks Score (PT): 13    Angela Mcelroy PT  11/1/2020      Electronically signed by Angela Mcelroy PT at 11/01/20 1151       Occupational Therapy Notes (last 48 hours) (Notes from 10/31/20 1232 through 11/02/20 1232)    No notes exist for this encounter.

## 2020-11-02 NOTE — PLAN OF CARE
Goal Outcome Evaluation:  Plan of Care Reviewed With: patient  Progress: improving  Outcome Summary: Patient has been fidgety stating that he wants to go home, even attempting to get up out of bed on own, bed alarm continues to be used.  Restless while in bed causing telemetry and other readings to be very difficult to read and be accurate.  A couple V runs that were asymptomatic events.  Refused much of his snack and nectar water intake this evening.  Continues to be on room air.  May be able to go home with home health today or within a day or two.  Continue to monitor and provide care.

## 2020-11-02 NOTE — PROGRESS NOTES
Adult Nutrition  Assessment/PES    Patient Name:  Noe Martel  YOB: 1935  MRN: [id number]  Admit Date:  10/23/2020    Assessment Date:  11/2/2020    Comments:    Recommend:  1. Continue current diet order as medically appropriate and tolerated.  2. Encourage PO intake. Average intake ~44% x 9 meals.   3. Continue Prostat BID, Boost Pudding BID, and Magic Cup daily.   4. Consider a multivitamin with minerals daily.     RD to follow pt and available PRN.      Reason for Assessment     Row Name 11/02/20 1459          Reason for Assessment    Reason For Assessment  follow-up protocol     Diagnosis  cardiac disease;infection/sepsis;pulmonary disease;renal disease CAD, COPD, HTN, HLD, GERD, Sepsis, COVID, PNA, Grace             Labs/Tests/Procedures/Meds     Row Name 11/02/20 1452          Labs/Procedures/Meds    Lab Results Reviewed  reviewed, pertinent     Lab Results Comments  Low: Alb, Phos; High: Cl, BUN        Medications    Pertinent Medications Reviewed  reviewed, pertinent     Pertinent Medications Comments  Novolog, Remeron, Protonix         Physical Findings     Row Name 11/02/20 1452          Physical Findings    Skin  pressure injury;other (see comments) Bilateral gluteal and right heel pressure injuries, MASD           Nutrition Prescription Ordered     Row Name 11/02/20 1450          Nutrition Prescription PO    Current PO Diet  Pureed     Fluid Consistency  Nectar/syrup thick     Supplement  Boost Pudding (Ensure Pudding);Magic Cup;ProStat     Common Modifiers  Cardiac         Evaluation of Received Nutrient/Fluid Intake     Row Name 11/02/20 1455          PO Evaluation    Number of Days PO Intake Evaluated  3 days     Number of Meals  9     % PO Intake  44               Problem/Interventions:  Problem 1     Row Name 11/02/20 1453          Nutrition Diagnoses Problem 1    Problem 1  Increased Nutrient Needs     Macronutrient  Kcal;Fluid;Protein     Etiology (related to)  Medical Diagnosis      Infectious Disease  Sepsis     Pulmonary/Critical Care  Pneumonia;COPD;Other (comment) COVID     Skin  Pressure injury     Signs/Symptoms (evidenced by)  Report/Observation     Reported/Observed By  MD         Problem 2     Row Name 11/02/20 1454          Nutrition Diagnoses Problem 2    Problem 2  Altered Nutrition Related to Labs     Etiology (related to)  Medical Diagnosis     Endocrine  Hyperglycemia     Renal  SANG     Signs/Symptoms (evidenced by)  Biochemical     Specific Labs Noted  BUN         Problem 3     Row Name 11/02/20 1454          Nutrition Diagnoses Problem 3    Problem 3  Inadequate Intake/Infusion     Inadequate Intake Type  Oral     Macronutrient  Kcal;Fat;Fluid;Fiber;Carbohydrate;Protein     Micronutrient  Vitamin;Mineral     Etiology (related to)  Factors Affecting Nutrition     Signs/Symptoms (evidenced by)  PO Intake     Percent (%) intake recorded  44 %     Over number of meals  9           Intervention Goal     Row Name 11/02/20 1454          Intervention Goal    General  Improved nutrition related lab(s);Meet nutritional needs for age/condition     PO  Meet estimated needs;Tolerate PO;PO intake (%);Increase intake     PO Intake %  50 %     Weight  Maintain weight         Nutrition Intervention     Row Name 11/02/20 1459          Nutrition Intervention    RD/Tech Action  Follow Tx progress;Encourage intake         Nutrition Prescription     Row Name 11/02/20 1458          Nutrition Prescription PO    PO Prescription  Other (comment) Continue diet and supplement as tolerated     New PO Prescription Ordered?  No, recommended        Other Orders    Obtain Weight  Daily     Obtain Weight Ordered?  No, recommended     Supplement  Vitamin mineral supplement     Supplement Ordered?  No, recommended         Education/Evaluation     Row Name 11/02/20 0452          Education    Education  Education not appropriate at this time     Please explain  Other (comment) pt isolation status         Monitor/Evaluation    Monitor  Per protocol;PO intake;I&O;Supplement intake;Pertinent labs;Weight;Skin status           Electronically signed by:  Julia Saxena RD  11/02/20 14:55 EST

## 2020-11-02 NOTE — PLAN OF CARE
Goal Outcome Evaluation:  Plan of Care Reviewed With: patient  Progress: no change  Outcome Summary: VSS. pt to be discharged home today

## 2020-11-03 ENCOUNTER — APPOINTMENT (OUTPATIENT)
Dept: CT IMAGING | Facility: HOSPITAL | Age: 85
End: 2020-11-03

## 2020-11-03 ENCOUNTER — APPOINTMENT (OUTPATIENT)
Dept: GENERAL RADIOLOGY | Facility: HOSPITAL | Age: 85
End: 2020-11-03

## 2020-11-03 LAB
ALBUMIN SERPL-MCNC: 2.4 G/DL (ref 3.5–5.2)
ANION GAP SERPL CALCULATED.3IONS-SCNC: 11.2 MMOL/L (ref 5–15)
BUN SERPL-MCNC: 97 MG/DL (ref 8–23)
BUN/CREAT SERPL: 65.5 (ref 7–25)
C DIFF GDH STL QL: NEGATIVE
CALCIUM SPEC-SCNC: 7.5 MG/DL (ref 8.6–10.5)
CHLORIDE SERPL-SCNC: 113 MMOL/L (ref 98–107)
CO2 SERPL-SCNC: 20.8 MMOL/L (ref 22–29)
CREAT SERPL-MCNC: 1.48 MG/DL (ref 0.76–1.27)
DEPRECATED RDW RBC AUTO: 44.9 FL (ref 37–54)
ERYTHROCYTE [DISTWIDTH] IN BLOOD BY AUTOMATED COUNT: 13.4 % (ref 12.3–15.4)
GFR SERPL CREATININE-BSD FRML MDRD: 45 ML/MIN/1.73
GLUCOSE BLDC GLUCOMTR-MCNC: 153 MG/DL (ref 70–130)
GLUCOSE BLDC GLUCOMTR-MCNC: 165 MG/DL (ref 70–130)
GLUCOSE BLDC GLUCOMTR-MCNC: 204 MG/DL (ref 70–130)
GLUCOSE BLDC GLUCOMTR-MCNC: 223 MG/DL (ref 70–130)
GLUCOSE BLDC GLUCOMTR-MCNC: 245 MG/DL (ref 70–130)
GLUCOSE SERPL-MCNC: 248 MG/DL (ref 65–99)
HCT VFR BLD AUTO: 31.1 % (ref 37.5–51)
HGB BLD-MCNC: 10.1 G/DL (ref 13–17.7)
MCH RBC QN AUTO: 29.8 PG (ref 26.6–33)
MCHC RBC AUTO-ENTMCNC: 32.5 G/DL (ref 31.5–35.7)
MCV RBC AUTO: 91.7 FL (ref 79–97)
PHOSPHATE SERPL-MCNC: 3.1 MG/DL (ref 2.5–4.5)
PLATELET # BLD AUTO: 221 10*3/MM3 (ref 140–450)
PMV BLD AUTO: 12.3 FL (ref 6–12)
POTASSIUM SERPL-SCNC: 4.6 MMOL/L (ref 3.5–5.2)
PROCALCITONIN SERPL-MCNC: 0.28 NG/ML (ref 0–0.25)
RBC # BLD AUTO: 3.39 10*6/MM3 (ref 4.14–5.8)
SODIUM SERPL-SCNC: 145 MMOL/L (ref 136–145)
WBC # BLD AUTO: 21.89 10*3/MM3 (ref 3.4–10.8)

## 2020-11-03 PROCEDURE — 87324 CLOSTRIDIUM AG IA: CPT | Performed by: INTERNAL MEDICINE

## 2020-11-03 PROCEDURE — 85027 COMPLETE CBC AUTOMATED: CPT | Performed by: INTERNAL MEDICINE

## 2020-11-03 PROCEDURE — 63710000001 INSULIN ASPART PER 5 UNITS: Performed by: EMERGENCY MEDICINE

## 2020-11-03 PROCEDURE — 87449 NOS EACH ORGANISM AG IA: CPT | Performed by: INTERNAL MEDICINE

## 2020-11-03 PROCEDURE — 86900 BLOOD TYPING SEROLOGIC ABO: CPT

## 2020-11-03 PROCEDURE — 74176 CT ABD & PELVIS W/O CONTRAST: CPT

## 2020-11-03 PROCEDURE — 80069 RENAL FUNCTION PANEL: CPT | Performed by: INTERNAL MEDICINE

## 2020-11-03 PROCEDURE — 25010000002 ENOXAPARIN PER 10 MG: Performed by: EMERGENCY MEDICINE

## 2020-11-03 PROCEDURE — 25010000002 CYANOCOBALAMIN PER 1000 MCG: Performed by: INTERNAL MEDICINE

## 2020-11-03 PROCEDURE — 82962 GLUCOSE BLOOD TEST: CPT

## 2020-11-03 PROCEDURE — 84145 PROCALCITONIN (PCT): CPT | Performed by: EMERGENCY MEDICINE

## 2020-11-03 PROCEDURE — 86901 BLOOD TYPING SEROLOGIC RH(D): CPT

## 2020-11-03 PROCEDURE — 99232 SBSQ HOSP IP/OBS MODERATE 35: CPT | Performed by: INTERNAL MEDICINE

## 2020-11-03 PROCEDURE — 71045 X-RAY EXAM CHEST 1 VIEW: CPT

## 2020-11-03 RX ORDER — SODIUM CHLORIDE, SODIUM LACTATE, POTASSIUM CHLORIDE, CALCIUM CHLORIDE 600; 310; 30; 20 MG/100ML; MG/100ML; MG/100ML; MG/100ML
500 INJECTION, SOLUTION INTRAVENOUS CONTINUOUS
Status: ACTIVE | OUTPATIENT
Start: 2020-11-03 | End: 2020-11-03

## 2020-11-03 RX ORDER — CYANOCOBALAMIN 1000 UG/ML
1000 INJECTION, SOLUTION INTRAMUSCULAR; SUBCUTANEOUS ONCE
Status: COMPLETED | OUTPATIENT
Start: 2020-11-03 | End: 2020-11-03

## 2020-11-03 RX ORDER — SODIUM CHLORIDE 9 MG/ML
125 INJECTION, SOLUTION INTRAVENOUS CONTINUOUS
Status: DISCONTINUED | OUTPATIENT
Start: 2020-11-03 | End: 2020-11-04

## 2020-11-03 RX ADMIN — ASPIRIN 81 MG CHEWABLE TABLET 81 MG: 81 TABLET CHEWABLE at 08:30

## 2020-11-03 RX ADMIN — METOPROLOL TARTRATE 25 MG: 25 TABLET, FILM COATED ORAL at 08:30

## 2020-11-03 RX ADMIN — SODIUM CHLORIDE 500 ML: 9 INJECTION, SOLUTION INTRAVENOUS at 08:29

## 2020-11-03 RX ADMIN — SODIUM CHLORIDE 125 ML/HR: 9 INJECTION, SOLUTION INTRAVENOUS at 08:32

## 2020-11-03 RX ADMIN — SODIUM CHLORIDE 125 ML/HR: 9 INJECTION, SOLUTION INTRAVENOUS at 18:05

## 2020-11-03 RX ADMIN — NYSTATIN 500000 UNITS: 500000 SUSPENSION ORAL at 12:26

## 2020-11-03 RX ADMIN — SODIUM CHLORIDE 500 ML: 9 INJECTION, SOLUTION INTRAVENOUS at 15:15

## 2020-11-03 RX ADMIN — Medication 30 ML: at 08:30

## 2020-11-03 RX ADMIN — NYSTATIN 500000 UNITS: 500000 SUSPENSION ORAL at 18:00

## 2020-11-03 RX ADMIN — ENOXAPARIN SODIUM 40 MG: 40 INJECTION SUBCUTANEOUS at 18:00

## 2020-11-03 RX ADMIN — CYANOCOBALAMIN 1000 MCG: 1000 INJECTION, SOLUTION INTRAMUSCULAR at 15:14

## 2020-11-03 RX ADMIN — NYSTATIN 500000 UNITS: 500000 SUSPENSION ORAL at 08:30

## 2020-11-03 RX ADMIN — PANTOPRAZOLE SODIUM 40 MG: 40 TABLET, DELAYED RELEASE ORAL at 06:31

## 2020-11-03 RX ADMIN — INSULIN ASPART 2 UNITS: 100 INJECTION, SOLUTION INTRAVENOUS; SUBCUTANEOUS at 17:59

## 2020-11-03 RX ADMIN — BUDESONIDE AND FORMOTEROL FUMARATE DIHYDRATE 2 PUFF: 160; 4.5 AEROSOL RESPIRATORY (INHALATION) at 08:30

## 2020-11-03 RX ADMIN — SODIUM CHLORIDE, POTASSIUM CHLORIDE, SODIUM LACTATE AND CALCIUM CHLORIDE 999 ML/HR: 600; 310; 30; 20 INJECTION, SOLUTION INTRAVENOUS at 21:55

## 2020-11-03 RX ADMIN — SODIUM CHLORIDE, PRESERVATIVE FREE 10 ML: 5 INJECTION INTRAVENOUS at 08:31

## 2020-11-03 RX ADMIN — INSULIN ASPART 3 UNITS: 100 INJECTION, SOLUTION INTRAVENOUS; SUBCUTANEOUS at 12:25

## 2020-11-03 NOTE — PROGRESS NOTES
Discharge Planning Assessment   [place]     Patient Name: Noe Martel  MRN: [id number]  Today's Date: 11/3/2020    Admit Date: 10/23/2020    Discharge Needs Assessment    No documentation.       Discharge Plan     Row Name 11/03/20 1144       Plan    Plan Comments  Patient not medically ready for discharge today per Dr Shane        Continued Care and Services - Admitted Since 10/23/2020     Destination     Service Provider Request Status Selected Services Address Phone Fax    [facility]  Pending - Request Sent N/A [address] [place] [address] [phone]              Expected Discharge Date and Time     Expected Discharge Date Expected Discharge Time    Nov 4, 2020         Demographic Summary    No documentation.       Functional Status    No documentation.       Psychosocial    No documentation.       Abuse/Neglect    No documentation.       Legal    No documentation.       Substance Abuse    No documentation.       Patient Forms    No documentation.           Angeline Molina RN

## 2020-11-03 NOTE — PROGRESS NOTES
[facility]    Name:  Noe Martel   Age:  85 y.o.  Sex:  male  :  1935  MRN:  [id number]   Visit Number:  [id number]  Admission Date:  10/23/2020  Date Of Service:  20  Primary Care Physician:  Devika Foy APRN     LOS: 11 days :  Patient Care Team:  Devika Foy APRN as PCP - General (Family Medicine):    Chief Complaint:      Generalized weakness.    Subjective / Interval History:     Mr. Martel was seen and examined this morning.  He is currently lying down on the bed and seems to be comfortable at rest.  He opens eyes on call but does not want to talk much.  According to the nursing staff, he has not been eating much.  Unfortunately, he was noted to have elevated creatinine level this morning.  I have started him on IV fluids after a bolus of normal saline 500 mL.  He was initially planned for discharge yesterday with home health services but the discharge was canceled as home health could not be set up in time.  Patient has been in the hospital for the last 10 days and has received treatment for COVID-19 pneumonia with remdesivir.  According to the nurse, patient has had some diarrhea today.  Prior to admission, patient lived with his wife and son.    Review of Systems:     Does not seem to be in any distress.  Unable to obtain review of systems as he is confused.    Vital Signs:    Temp:  [97.5 °F (36.4 °C)-98.7 °F (37.1 °C)] 98.7 °F (37.1 °C)  Heart Rate:  [] 96  Resp:  [19-20] 20  BP: ()/(55-99) 90/55    Intake and output:    I/O last 3 completed shifts:  In: 240 [P.O.:240]  Out: -   I/O this shift:  In: 120 [P.O.:120]  Out: -     Physical Examination:    General Appearance:  Alert and cooperative, not in any acute distress.   Head:  Atraumatic and normocephalic, without obvious abnormality.   Eyes:          PERRLA, conjunctivae and sclerae normal, no Icterus. No pallor. Extraocular movements are within normal limits.   Neck:  Supple, trachea midline, no thyromegaly, no carotid bruit.   Lungs:   Chest shape is normal. Breath sounds heard bilaterally equally.  No crackles or wheezing. No pleural rub or bronchial breathing.   Heart:  Normal S1 and S2, no murmur, no gallop, no rub. No JVD   Abdomen:   Normal bowel sounds, no masses, no organomegaly. Soft, nontender, nondistended, no guarding, no rebound tenderness. Ecchymotic patches noted on the abdominal wall.   Extremities: Moves all extremities, no edema, no cyanosis, no clubbing.   Skin: No bleeding. Skin excoriation noted in the right hip area.  No erythema.   Neurologic: Awake, alert mostly nonverbal and confused at times. Moves all 4 extremities equally but does have generalized weakness.     Laboratory results:    Results from last 7 days   Lab Units 11/03/20  0628 11/02/20  0630 11/01/20  0750 10/31/20  0745 10/30/20  0655 10/29/20  0600   SODIUM mmol/L 145 143 141 138 137 138   POTASSIUM mmol/L 4.6 4.2 4.2 4.3 4.4 4.2   CHLORIDE mmol/L 113* 110* 107 106 105 109*   CO2 mmol/L 20.8* 24.4 24.0 23.2 22.3 23.4   BUN mg/dL 97* 43* 40* 24* 31* 37*   CREATININE mg/dL 1.48* 0.98 1.06 1.06 1.09 1.03   CALCIUM mg/dL 7.5* 7.2* 7.3* 7.4* 7.2* 7.2*   BILIRUBIN mg/dL  --   --   --  0.7 0.6 0.5   ALK PHOS U/L  --   --   --  46 47 37*   ALT (SGPT) U/L  --   --   --  36 31 28   AST (SGOT) U/L  --   --   --  33 32 23   GLUCOSE mg/dL 248* 117* 96 94 91 94     Results from last 7 days   Lab Units 11/03/20  0627 11/02/20 0630 11/01/20  0750   WBC 10*3/mm3 21.89* 13.99* 11.88*   HEMOGLOBIN g/dL 10.1* 12.0* 13.5   HEMATOCRIT % 31.1* 37.5 40.7   PLATELETS 10*3/mm3 221 200 196                     I have reviewed the patient's laboratory results.    Radiology results:    Imaging Results (Last 24 Hours)     ** No results found for the last 24 hours. **        Medication Review:     I have reviewed the patient's active and prn medications.       Acute respiratory failure with hypoxia (CMS/HCC)     Hypertension    CAD (coronary artery disease)    GERD (gastroesophageal reflux disease)    COVID-19    Sepsis, unspecified organism (CMS/[facility])    Assessment:    1.  Acute hypoxic respiratory failure secondary to #2, present on admission.  2.  Patchy bilateral COVID-19 pneumonia, present on admission, improved.  3.  Acute renal failure, present on admission, not related to sepsis, resolved.  4.  Sepsis secondary to #2, present on admission, resolved.  5.  Essential hypertension.  6.  Coronary artery disease.  7.  Gastroesophageal reflux disease.    Plan:    Mr. Martel unfortunately has become worse today with regards to his blood pressures and renal function.  He is slightly hypotensive and tachycardic.  We will give him 1 L of normal saline bolus today and continue him on normal saline at 125 mL/h.  Discussed with nursing staff.    He does have renal failure likely secondary to poor oral intake.  He also has diarrhea and with his elevated white count, there is suspicion of C. difficile colitis.  We will obtain stool C. difficile toxin.  We will hold the metoprolol if his systolic blood pressure less than 100.    Patient may have secondary bacterial infection but denies any cough at this time.  We will get a chest x-ray.  Until we get stool C. difficile toxin we will hold off on antibiotic therapy.  He does not have any evidence of hypoglycemia at this time.    Overall, patient's prognosis seems to be poor due to his multiple comorbidities and advanced age and renal failure.  I had a long discussion with his wife Silvia over the phone.  She does not want him to go to short-term rehab or nursing home.  She does not seem to be understanding the prognosis of the patient and his progressive decline.  I discussed the patient's condition with his son Oral over the phone and explained to him the patient's worsening condition.  He was agreeable for patient to go to short-term rehabilitation if needed.  I discussed advanced  directives with the patient's wife and unfortunately she was not able to give me any answer and she stated that she did not discuss this with the patient himself.    Sushil Shnae MD  11/03/20  14:35 EST    Dictated utilizing Dragon dictation.

## 2020-11-03 NOTE — PLAN OF CARE
Goal Outcome Evaluation:  Plan of Care Reviewed With: patient  Progress: declining  Outcome Summary: Blood pressure low, 500 bolus given, BUN elevated. Poor nutrition, MD notified. Will continue to monitor.

## 2020-11-04 ENCOUNTER — APPOINTMENT (OUTPATIENT)
Dept: CARDIOLOGY | Facility: HOSPITAL | Age: 85
End: 2020-11-04

## 2020-11-04 LAB
A-A DO2: 58.8 MMHG
ABO GROUP BLD: NORMAL
ABO GROUP BLD: NORMAL
ALBUMIN SERPL-MCNC: 2.1 G/DL (ref 3.5–5.2)
ALBUMIN/GLOB SERPL: 1 G/DL
ALP SERPL-CCNC: 42 U/L (ref 39–117)
ALT SERPL W P-5'-P-CCNC: 20 U/L (ref 1–41)
ANION GAP SERPL CALCULATED.3IONS-SCNC: 14 MMOL/L (ref 5–15)
ARTERIAL PATENCY WRIST A: POSITIVE
AST SERPL-CCNC: 18 U/L (ref 1–40)
ATMOSPHERIC PRESS: 740 MMHG
BACTERIA UR QL AUTO: ABNORMAL /HPF
BASE EXCESS BLDA CALC-SCNC: -7 MMOL/L (ref 0–2)
BDY SITE: ABNORMAL
BILIRUB SERPL-MCNC: 0.4 MG/DL (ref 0–1.2)
BILIRUB UR QL STRIP: NEGATIVE
BLD GP AB SCN SERPL QL: NEGATIVE
BUN SERPL-MCNC: 115 MG/DL (ref 8–23)
BUN/CREAT SERPL: 47.5 (ref 7–25)
CALCIUM SPEC-SCNC: 6.8 MG/DL (ref 8.6–10.5)
CHLORIDE SERPL-SCNC: 116 MMOL/L (ref 98–107)
CK SERPL-CCNC: 245 U/L (ref 20–200)
CLARITY UR: ABNORMAL
CO2 SERPL-SCNC: 18 MMOL/L (ref 22–29)
COHGB MFR BLD: 0.8 % (ref 0–2)
COLOR UR: YELLOW
CREAT SERPL-MCNC: 2.42 MG/DL (ref 0.76–1.27)
D-LACTATE SERPL-SCNC: 1.9 MMOL/L (ref 0.5–2)
D-LACTATE SERPL-SCNC: 2.8 MMOL/L (ref 0.5–2)
DEPRECATED RDW RBC AUTO: 46.7 FL (ref 37–54)
ERYTHROCYTE [DISTWIDTH] IN BLOOD BY AUTOMATED COUNT: 13.7 % (ref 12.3–15.4)
GFR SERPL CREATININE-BSD FRML MDRD: 26 ML/MIN/1.73
GLOBULIN UR ELPH-MCNC: 2.1 GM/DL
GLUCOSE BLDC GLUCOMTR-MCNC: 161 MG/DL (ref 70–130)
GLUCOSE BLDC GLUCOMTR-MCNC: 175 MG/DL (ref 70–130)
GLUCOSE BLDC GLUCOMTR-MCNC: 178 MG/DL (ref 70–130)
GLUCOSE SERPL-MCNC: 150 MG/DL (ref 65–99)
GLUCOSE UR STRIP-MCNC: NEGATIVE MG/DL
HBA1C MFR BLD: 6.7 % (ref 4.8–5.6)
HCO3 BLDA-SCNC: 15.8 MMOL/L (ref 22–28)
HCT VFR BLD AUTO: 24.8 % (ref 37.5–51)
HCT VFR BLD AUTO: 31.1 % (ref 37.5–51)
HCT VFR BLD CALC: 22.5 %
HGB BLD-MCNC: 10.4 G/DL (ref 13–17.7)
HGB BLD-MCNC: 8 G/DL (ref 13–17.7)
HGB UR QL STRIP.AUTO: ABNORMAL
HYALINE CASTS UR QL AUTO: ABNORMAL /LPF
INHALED O2 CONCENTRATION: 21 %
KETONES UR QL STRIP: NEGATIVE
LACTATE HOLD SPECIMEN: NORMAL
LEUKOCYTE ESTERASE UR QL STRIP.AUTO: NEGATIVE
LIPASE SERPL-CCNC: 27 U/L (ref 13–60)
MAXIMAL PREDICTED HEART RATE: 136 BPM
MCH RBC QN AUTO: 29.9 PG (ref 26.6–33)
MCHC RBC AUTO-ENTMCNC: 32.3 G/DL (ref 31.5–35.7)
MCV RBC AUTO: 92.5 FL (ref 79–97)
METHGB BLD QL: 0.5 % (ref 0–1.5)
MODALITY: ABNORMAL
MRSA DNA SPEC QL NAA+PROBE: NORMAL
NITRITE UR QL STRIP: NEGATIVE
NOTE: ABNORMAL
OXYHGB MFR BLDV: 92.1 % (ref 94–99)
PCO2 BLDA: 22.1 MM HG (ref 35–45)
PCO2 TEMP ADJ BLD: ABNORMAL MM[HG]
PH BLDA: 7.46 PH UNITS (ref 7.3–7.5)
PH UR STRIP.AUTO: <=5 [PH] (ref 5–8)
PH, TEMP CORRECTED: ABNORMAL
PLATELET # BLD AUTO: 208 10*3/MM3 (ref 140–450)
PMV BLD AUTO: 12 FL (ref 6–12)
PO2 BLDA: 61.8 MM HG (ref 75–100)
PO2 TEMP ADJ BLD: ABNORMAL MM[HG]
POTASSIUM SERPL-SCNC: 4.6 MMOL/L (ref 3.5–5.2)
PROT SERPL-MCNC: 4.2 G/DL (ref 6–8.5)
PROT UR QL STRIP: ABNORMAL
RBC # BLD AUTO: 2.68 10*6/MM3 (ref 4.14–5.8)
RBC # UR: ABNORMAL /HPF
REF LAB TEST METHOD: ABNORMAL
RH BLD: POSITIVE
RH BLD: POSITIVE
SAO2 % BLDCOA: 93.3 % (ref 94–100)
SODIUM SERPL-SCNC: 148 MMOL/L (ref 136–145)
SP GR UR STRIP: 1.02 (ref 1–1.03)
SQUAMOUS #/AREA URNS HPF: ABNORMAL /HPF
STRESS TARGET HR: 116 BPM
T&S EXPIRATION DATE: NORMAL
UROBILINOGEN UR QL STRIP: ABNORMAL
VENTILATOR MODE: ABNORMAL
WBC # BLD AUTO: 36.85 10*3/MM3 (ref 3.4–10.8)
WBC UR QL AUTO: ABNORMAL /HPF

## 2020-11-04 PROCEDURE — 87641 MR-STAPH DNA AMP PROBE: CPT | Performed by: EMERGENCY MEDICINE

## 2020-11-04 PROCEDURE — 83605 ASSAY OF LACTIC ACID: CPT | Performed by: EMERGENCY MEDICINE

## 2020-11-04 PROCEDURE — 63710000001 INSULIN ASPART PER 5 UNITS: Performed by: EMERGENCY MEDICINE

## 2020-11-04 PROCEDURE — 87040 BLOOD CULTURE FOR BACTERIA: CPT | Performed by: INTERNAL MEDICINE

## 2020-11-04 PROCEDURE — 94799 UNLISTED PULMONARY SVC/PX: CPT

## 2020-11-04 PROCEDURE — 02HV33Z INSERTION OF INFUSION DEVICE INTO SUPERIOR VENA CAVA, PERCUTANEOUS APPROACH: ICD-10-PCS | Performed by: EMERGENCY MEDICINE

## 2020-11-04 PROCEDURE — 99291 CRITICAL CARE FIRST HOUR: CPT | Performed by: EMERGENCY MEDICINE

## 2020-11-04 PROCEDURE — 36600 WITHDRAWAL OF ARTERIAL BLOOD: CPT

## 2020-11-04 PROCEDURE — 86900 BLOOD TYPING SEROLOGIC ABO: CPT

## 2020-11-04 PROCEDURE — 86850 RBC ANTIBODY SCREEN: CPT | Performed by: EMERGENCY MEDICINE

## 2020-11-04 PROCEDURE — 99222 1ST HOSP IP/OBS MODERATE 55: CPT | Performed by: SURGERY

## 2020-11-04 PROCEDURE — 81001 URINALYSIS AUTO W/SCOPE: CPT | Performed by: INTERNAL MEDICINE

## 2020-11-04 PROCEDURE — 82550 ASSAY OF CK (CPK): CPT | Performed by: INTERNAL MEDICINE

## 2020-11-04 PROCEDURE — 85027 COMPLETE CBC AUTOMATED: CPT | Performed by: INTERNAL MEDICINE

## 2020-11-04 PROCEDURE — 80053 COMPREHEN METABOLIC PANEL: CPT | Performed by: INTERNAL MEDICINE

## 2020-11-04 PROCEDURE — 36556 INSERT NON-TUNNEL CV CATH: CPT | Performed by: EMERGENCY MEDICINE

## 2020-11-04 PROCEDURE — 86901 BLOOD TYPING SEROLOGIC RH(D): CPT | Performed by: EMERGENCY MEDICINE

## 2020-11-04 PROCEDURE — 36430 TRANSFUSION BLD/BLD COMPNT: CPT

## 2020-11-04 PROCEDURE — 86920 COMPATIBILITY TEST SPIN: CPT

## 2020-11-04 PROCEDURE — 63710000001 INSULIN REGULAR HUMAN PER 5 UNITS: Performed by: EMERGENCY MEDICINE

## 2020-11-04 PROCEDURE — 82805 BLOOD GASES W/O2 SATURATION: CPT

## 2020-11-04 PROCEDURE — B548ZZA ULTRASONOGRAPHY OF SUPERIOR VENA CAVA, GUIDANCE: ICD-10-PCS | Performed by: EMERGENCY MEDICINE

## 2020-11-04 PROCEDURE — 25010000002 PIPERACILLIN SOD-TAZOBACTAM PER 1 G: Performed by: EMERGENCY MEDICINE

## 2020-11-04 PROCEDURE — 83036 HEMOGLOBIN GLYCOSYLATED A1C: CPT | Performed by: EMERGENCY MEDICINE

## 2020-11-04 PROCEDURE — 85018 HEMOGLOBIN: CPT | Performed by: INTERNAL MEDICINE

## 2020-11-04 PROCEDURE — 83690 ASSAY OF LIPASE: CPT | Performed by: EMERGENCY MEDICINE

## 2020-11-04 PROCEDURE — 93306 TTE W/DOPPLER COMPLETE: CPT

## 2020-11-04 PROCEDURE — 83050 HGB METHEMOGLOBIN QUAN: CPT

## 2020-11-04 PROCEDURE — 82375 ASSAY CARBOXYHB QUANT: CPT

## 2020-11-04 PROCEDURE — 82962 GLUCOSE BLOOD TEST: CPT

## 2020-11-04 PROCEDURE — 99233 SBSQ HOSP IP/OBS HIGH 50: CPT | Performed by: INTERNAL MEDICINE

## 2020-11-04 PROCEDURE — 85014 HEMATOCRIT: CPT | Performed by: INTERNAL MEDICINE

## 2020-11-04 PROCEDURE — 86900 BLOOD TYPING SEROLOGIC ABO: CPT | Performed by: EMERGENCY MEDICINE

## 2020-11-04 PROCEDURE — P9016 RBC LEUKOCYTES REDUCED: HCPCS

## 2020-11-04 PROCEDURE — 93306 TTE W/DOPPLER COMPLETE: CPT | Performed by: INTERNAL MEDICINE

## 2020-11-04 PROCEDURE — 25010000002 PIPERACILLIN SOD-TAZOBACTAM PER 1 G: Performed by: INTERNAL MEDICINE

## 2020-11-04 RX ORDER — SODIUM CHLORIDE, SODIUM LACTATE, POTASSIUM CHLORIDE, CALCIUM CHLORIDE 600; 310; 30; 20 MG/100ML; MG/100ML; MG/100ML; MG/100ML
200 INJECTION, SOLUTION INTRAVENOUS CONTINUOUS
Status: DISCONTINUED | OUTPATIENT
Start: 2020-11-04 | End: 2020-11-04

## 2020-11-04 RX ORDER — PANTOPRAZOLE SODIUM 40 MG/10ML
80 INJECTION, POWDER, LYOPHILIZED, FOR SOLUTION INTRAVENOUS ONCE
Status: COMPLETED | OUTPATIENT
Start: 2020-11-04 | End: 2020-11-04

## 2020-11-04 RX ORDER — PANTOPRAZOLE SODIUM 40 MG/10ML
40 INJECTION, POWDER, LYOPHILIZED, FOR SOLUTION INTRAVENOUS EVERY 12 HOURS SCHEDULED
Status: DISCONTINUED | OUTPATIENT
Start: 2020-11-04 | End: 2020-11-10

## 2020-11-04 RX ORDER — NOREPINEPHRINE BIT/0.9 % NACL 8 MG/250ML
.02-.3 INFUSION BOTTLE (ML) INTRAVENOUS
Status: DISCONTINUED | OUTPATIENT
Start: 2020-11-04 | End: 2020-11-06

## 2020-11-04 RX ORDER — DEXTROSE AND SODIUM CHLORIDE 5; .9 G/100ML; G/100ML
100 INJECTION, SOLUTION INTRAVENOUS CONTINUOUS
Status: DISCONTINUED | OUTPATIENT
Start: 2020-11-04 | End: 2020-11-06

## 2020-11-04 RX ADMIN — PANTOPRAZOLE SODIUM 80 MG: 40 INJECTION, POWDER, FOR SOLUTION INTRAVENOUS at 02:01

## 2020-11-04 RX ADMIN — HUMAN INSULIN 2 UNITS: 100 INJECTION, SOLUTION SUBCUTANEOUS at 17:00

## 2020-11-04 RX ADMIN — INSULIN ASPART 2 UNITS: 100 INJECTION, SOLUTION INTRAVENOUS; SUBCUTANEOUS at 06:46

## 2020-11-04 RX ADMIN — SODIUM CHLORIDE, POTASSIUM CHLORIDE, SODIUM LACTATE AND CALCIUM CHLORIDE 200 ML/HR: 600; 310; 30; 20 INJECTION, SOLUTION INTRAVENOUS at 04:41

## 2020-11-04 RX ADMIN — Medication 0.02 MCG/KG/MIN: at 04:22

## 2020-11-04 RX ADMIN — NYSTATIN 500000 UNITS: 500000 SUSPENSION ORAL at 20:37

## 2020-11-04 RX ADMIN — TAZOBACTAM SODIUM AND PIPERACILLIN SODIUM 3.38 G: 375; 3 INJECTION, SOLUTION INTRAVENOUS at 02:03

## 2020-11-04 RX ADMIN — PANTOPRAZOLE SODIUM 40 MG: 40 INJECTION, POWDER, FOR SOLUTION INTRAVENOUS at 13:20

## 2020-11-04 RX ADMIN — TAZOBACTAM SODIUM AND PIPERACILLIN SODIUM 3.38 G: 375; 3 INJECTION, SOLUTION INTRAVENOUS at 07:24

## 2020-11-04 RX ADMIN — NYSTATIN 500000 UNITS: 500000 SUSPENSION ORAL at 11:08

## 2020-11-04 RX ADMIN — Medication 0.18 MCG/KG/MIN: at 17:45

## 2020-11-04 RX ADMIN — PANTOPRAZOLE SODIUM 40 MG: 40 INJECTION, POWDER, FOR SOLUTION INTRAVENOUS at 20:37

## 2020-11-04 RX ADMIN — DEXTROSE AND SODIUM CHLORIDE 100 ML/HR: 5; 900 INJECTION, SOLUTION INTRAVENOUS at 13:21

## 2020-11-04 RX ADMIN — SODIUM CHLORIDE, PRESERVATIVE FREE 10 ML: 5 INJECTION INTRAVENOUS at 20:38

## 2020-11-04 RX ADMIN — NYSTATIN 500000 UNITS: 500000 SUSPENSION ORAL at 09:16

## 2020-11-04 RX ADMIN — NYSTATIN 500000 UNITS: 500000 SUSPENSION ORAL at 17:00

## 2020-11-04 RX ADMIN — TAZOBACTAM SODIUM AND PIPERACILLIN SODIUM 3.38 G: 375; 3 INJECTION, SOLUTION INTRAVENOUS at 15:42

## 2020-11-04 RX ADMIN — BUDESONIDE AND FORMOTEROL FUMARATE DIHYDRATE 2 PUFF: 160; 4.5 AEROSOL RESPIRATORY (INHALATION) at 08:59

## 2020-11-04 RX ADMIN — SODIUM CHLORIDE, PRESERVATIVE FREE 10 ML: 5 INJECTION INTRAVENOUS at 08:57

## 2020-11-04 RX ADMIN — Medication 0.22 MCG/KG/MIN: at 10:47

## 2020-11-04 RX ADMIN — HUMAN INSULIN 2 UNITS: 100 INJECTION, SOLUTION SUBCUTANEOUS at 11:08

## 2020-11-04 NOTE — THERAPY TREATMENT NOTE
Central line placed today. Hold PT d/t decline in medical status. PT will follow up with patient tomorrow and progress activity as tolerated and appropriate.

## 2020-11-04 NOTE — THERAPY TREATMENT NOTE
PT treatment held today per RN.  Patient has had low BP and is generally not feeling well.  PT will attempt again tomorrow.

## 2020-11-04 NOTE — PROGRESS NOTES
[facility]PITALIST    PROGRESS NOTE    Name:  Noe Martel   Age:  85 y.o.  Sex:  male  :  1935  MRN:  [id number]   Visit Number:  [id number]  Admission Date:  10/23/2020  Date Of Service:  20  Primary Care Physician:  Devika Foy APRN     LOS: 12 days :  Patient Care Team:  Devika Foy APRN as PCP - General (Family Medicine):    Chief Complaint:      Generalized weakness, hypotension and hypothermia.    Subjective:     Mr. Martel was seen and examined this morning.  Unfortunately, patient developed maroon-colored stools as well as hypotension through the night and had to be transferred to ICU.  He was evaluated by the on-call hospitalist and a right femoral central line was placed due to hypotension.  Patient was ordered 2 units of packed red blood cells and was started on Levophed drip for hypotension.  He was also placed on Protonix drip.  Dr. Hernandez from surgical services was consulted and she did evaluate the patient this morning and did not feel that the patient needs emergency endoscopy which I agree.  Patient was also thought to have septic shock and was started on broad-spectrum IV antibiotic therapy with vancomycin and Zosyn.  Patient currently responds to verbal stimuli but does not want to talk.  He opens eyes on sternal rub.  He was also noted to have hypothermia and was placed on warming blanket.    Hospital course:    Mr. Martel is an 85-year-old male with history of coronary artery disease, COPD, hypertension was brought to the emergency room by his family on 10/23/2020 with progressive weakness and decreased word output.  Patient's son reportedly has been sick and was diagnosed with Covid recently.  Patient has been feeling weak and has not been walking for a few days.  He was noted to have hypoxia in the emergency room at 68% and was placed on nonrebreather mask followed by BiPAP therapy.  His COVID-19 test was positive.  Patient was admitted to the ICU  and was placed on IV antibiotics therapy with Rocephin and Levaquin as well as IV dexamethasone.  He was started on remdesivir.  Due to his renal failure, nephrology was consulted.  Patient was placed on IV fluids for dehydration and renal failure.  His initial CT of the head was negative for acute intracranial abnormalities but showed chronic ischemic microvascular disease.  Patient has not been mostly non-verbal throughout the hospital stay but was able to eat at least 50% of his meals later in the hospital course.    Once the renal function improved, he did complete the course of remdesivir.  He also completed a course of Rocephin and Levaquin.  He was transferred out of ICU to the Covid floor.  He was started on physical and Occupational Therapy due to significant generalized weakness.  Case management was consulted for possible rehab placement but the family refused rehabilitation.  Patient was planned for discharge on 11/3/2020 with home health services but could not be discharged that day due to inability to arrange home health.  Patient continued to develop hypotension the next day and was noted to have worsening renal failure.  He was also noted to have dark-colored stools and diarrhea.  He had to be transferred back to the ICU and was suspected of septic shock possibly secondary to urinary tract infection.  He was also thought to have GI bleeding and was transfused with 2 units of packed red blood cells.  He was placed on IV Protonix.  Dr. Hernandez was consulted from surgical services.  Patient had to be placed on Levophed drip to maintain blood pressures and a right femoral central venous line was placed.    Review of Systems:     Does not seem to be in any distress.  Unable to obtain review of systems as he is confused.    Vital Signs:    Temp:  [96.5 °F (35.8 °C)-97.9 °F (36.6 °C)] 97.3 °F (36.3 °C)  Heart Rate:  [] 106  Resp:  [15-22] 17  BP: ()/(40-97) 101/72    Intake and output:    I/O last  3 completed shifts:  In: 4557 [P.O.:180; I.V.:4327; IV Piggyback:50]  Out: -   I/O this shift:  In: 2494 [I.V.:1781; Blood:713]  Out: 225 [Urine:225]    Physical Examination:    General Appearance:  Alert on call but does not talk, does not seem to be in any acute distress.   Head:  Atraumatic and normocephalic, without obvious abnormality.   Eyes:          PERRLA, conjunctivae and sclerae normal, no Icterus. No pallor.   Neck: Supple, trachea midline, no thyromegaly, no carotid bruit.   Lungs:   Chest shape is normal. Breath sounds heard bilaterally equally.  No wheezing.  Occasional basal crackles heard.  No pleural rub or bronchial breathing.   Heart:  Normal S1 and S2, no murmur, no gallop, no rub. No JVD   Abdomen:   Normal bowel sounds, no masses, no organomegaly. Soft, nontender, nondistended, no guarding, no rebound.  Condom catheter is in place.  Tenderness. Ecchymotic patches noted on the abdominal wall.   Extremities: Moves all extremities, no edema, no cyanosis, no clubbing.  Right femoral central venous line noted.   Skin: No bleeding. Skin excoriation noted in the right hip area.  No erythema.   Neurologic: Alert on call and sternal rub but mostly nonverbal and confused. Moves all 4 extremities on painful stimulus equally but does have generalized weakness.     Laboratory results:    Results from last 7 days   Lab Units 11/04/20  0033 11/03/20  0628 11/02/20  0630  10/31/20  0745 10/30/20  0655   SODIUM mmol/L 148* 145 143   < > 138 137   POTASSIUM mmol/L 4.6 4.6 4.2   < > 4.3 4.4   CHLORIDE mmol/L 116* 113* 110*   < > 106 105   CO2 mmol/L 18.0* 20.8* 24.4   < > 23.2 22.3   BUN mg/dL 115* 97* 43*   < > 24* 31*   CREATININE mg/dL 2.42* 1.48* 0.98   < > 1.06 1.09   CALCIUM mg/dL 6.8* 7.5* 7.2*   < > 7.4* 7.2*   BILIRUBIN mg/dL 0.4  --   --   --  0.7 0.6   ALK PHOS U/L 42  --   --   --  46 47   ALT (SGPT) U/L 20  --   --   --  36 31   AST (SGOT) U/L 18  --   --   --  33 32   GLUCOSE mg/dL 150* 248* 117*    < > 94 91    < > = values in this interval not displayed.     Results from last 7 days   Lab Units 11/04/20  1202 11/04/20  0033 11/03/20  0627 11/02/20  0630   WBC 10*3/mm3  --  36.85* 21.89* 13.99*   HEMOGLOBIN g/dL 10.4* 8.0* 10.1* 12.0*   HEMATOCRIT % 31.1* 24.8* 31.1* 37.5   PLATELETS 10*3/mm3  --  208 221 200         Results from last 7 days   Lab Units 11/04/20  0033   CK TOTAL U/L 245*         Results from last 7 days   Lab Units 11/04/20  0337   PH, ARTERIAL pH units 7.463   PO2 ART mm Hg 61.8*   PCO2, ARTERIAL mm Hg 22.1*   HCO3 ART mmol/L 15.8*     I have reviewed the patient's laboratory results.    Radiology results:    Imaging Results (Last 24 Hours)     Procedure Component Value Units Date/Time    CT Abdomen Pelvis Without Contrast [[id number]] Collected: 11/03/20 2344     Updated: 11/03/20 2345    Narrative:      FINAL REPORT    TECHNIQUE:  Axial images through the abdomen and pelvis were performed  without contrast. This study was performed with techniques to  keep radiation doses as low as reasonably achievable, (ALARA).  Individualized dose reduction techniques using automated  exposure control or adjustment of mA and/or kV according to the  patient's size were employed.    CLINICAL HISTORY:  diarrhea    FINDINGS:  ABDOMEN: Imaging of the lungs demonstrate an 8mm nodule in the  medial right lower lobe, a 5 mm lingular nodule and a 10 mm  nodule in the right lower lobe.  There is bibasilar atelectasis.  The heart size is normal.  Patient is status post  cholecystectomy.  Limited images of the liver are unremarkable.  The spleen is normal.  No adrenal mass is identified.  There is  mild peripancreatic inflammatory change.  The aorta is normal in  caliber.  There is no significant free fluid or adenopathy.  There is bilateral perinephric stranding. There is no  nephrolithiasis.  There is no hydronephrosis.  PELVIS: The  appendix is not identified.  There are diverticula of the  sigmoid colon without  evidence of diverticulitis.  Fecal  impaction is seen at the rectal vault. The urinary bladder is  unremarkable.  There is no significant free fluid or adenopathy.      Impression:      Multiple bilateral pulmonary nodules.  Recommend three-month  followup chest CT.  Questionable, mild pancreatitis.  Fecal  impaction at the rectal vault.    Authenticated by Anastacio Bridges MD on 11/03/2020 11:44:10 PM    XR Chest 1 View [[id number]] Collected: 11/03/20 1552     Updated: 11/03/20 1600    Narrative:      PROCEDURE: XR CHEST 1 VW-     HISTORY: Leukocytosis; U07.1-COVID-19; J18.9-Pneumonia, unspecified  organism; J96.21-Acute and chronic respiratory failure with hypoxia;  N17.9-Acute kidney failure, unspecified; R74.01-Elevation of levels of  liver transaminase levels; J96.01-Acute respiratory failure with  hypoxia; J44.9-Chronic obstructive pulmonary disease, unspecified     COMPARISON: 10/28/2020.     FINDINGS: The heart is normal in size. The mediastinum is unremarkable.  Opacities in the right greater than left lung apices are unchanged..  There are no effusions. There is no pneumothorax.  There are no acute  osseous abnormalities.       Impression:      Right greater than left apical opacities which are  unchanged.     Continued followup is recommended.     This report was finalized on 11/3/2020 3:58 PM by Jolene Stratton M.D..        Medication Review:     I have reviewed the patient's active and prn medications.       Acute respiratory failure with hypoxia (CMS/HCC)    Hypertension    CAD (coronary artery disease)    GERD (gastroesophageal reflux disease)    COVID-19    Sepsis, unspecified organism (CMS/HCC)    Assessment:    1.  Acute hypoxic respiratory failure secondary to #2, present on admission.  2.  Patchy bilateral COVID-19 pneumonia, present on admission, improved.  3.  Acute renal failure, present on admission, not related to sepsis, resolved.  4.  Sepsis secondary to #2, present on admission,  resolved.  5.  Septic shock developed on 11/3/2020, not present on admission.  6.  Suspected bilateral pyelonephritis.  7.  Acute blood loss anemia likely secondary to upper GI bleeding, status post 2 units PRBC.  8.  Essential hypertension.  9.  Coronary artery disease.  10.  Gastroesophageal reflux disease.  11.  Suspected underlying dementia.    Plan:    Mr. Martel unfortunately has taken a turn for the worse with hypotension, GI bleed and hypothermia.  He has likely developed septic shock from urinary tract infection.  I will get blood cultures as well as urine analysis.  He will be continued on Zosyn and vancomycin as well as Levophed drip.  I will place him on dextrose normal saline IV fluids.    Patient's renal function has worsened.  CT of the abdomen and pelvis done last night did not show any evidence of obstructive uropathy.  Hopefully with continued IV hydration and treatment of septic shock his renal function should improve.    Patient did have diarrhea yesterday but his C. difficile toxin is negative.  Unfortunately he has significant alleviation of WBCs.  Chest x-ray done yesterday was reviewed by me and it does not show any significant infiltrates.    Overall, patient's prognosis seems to be poor due to his multiple comorbidities and advanced age and renal failure.  Palliative care services have been consulted.  Patient likely has underlying Alzheimer's versus multi-infarct dementia.  At this time no focal neurological deficits can be identified.  He did have a CT of the head on 10/28/2020 which showed chronic microvascular disease.  Patient may benefit from changing his CODE STATUS to DNR.  Discussed with nursing staff and multidisciplinary team.    Addendum:    I had a long discussion in person with the patient's wife Silvia and son Oral in the ICU this evening.  I explained to them patient's multiple comorbidities including renal failure and septic shock.  According to them, prior to admission  patient was able to verbalize and was using walker to ambulate.  I discussed advanced directives in the presence of palliative care nurse.  Family is unable to make decision at this time and the patient will be kept full code.  I have discussed with regards to making decision regarding at least no CPR especially in view of his advanced age and poor quality of life.  Discussed and updated with nursing staff.    Total time spent in patient care and counseling of the family was 45 minutes.      Sushil Shane MD  11/04/20  12:44 EST    Dictated utilizing Dragon dictation.

## 2020-11-04 NOTE — PLAN OF CARE
Goal Outcome Evaluation:  Plan of Care Reviewed With: patient  Progress: declining   Seen today for wound consult related to deep tissue injury to right heel, deep tissue injury to right buttock and unstageable area to coccyx. Family meeting schedule today to determine further treatment.  Wound care orders in place, care plan and education in place to assist with wound healing.

## 2020-11-04 NOTE — PROGRESS NOTES
Son provided contact information for [facility] in [facility] (rehab center).  He reported they have a niece that works there.  Admission Coordinator, Tiffanie: [phone]  , Malaika:   [phone] Ext [phone]    Dr Shane informed son during the meeting that pt is unable to tolerate rehab at this time however if he improves, he will recommend short term rehab.    Informed Anne lugo that son found a short term rehab for pt when pt was ready and I would document it in the chart when I received the info.

## 2020-11-04 NOTE — PROGRESS NOTES
Continued Stay Note  LUIS EDUARDO Merino     Patient Name: Noe Martel  MRN: [id number]  Today's Date: 11/4/2020    Admit Date: 10/23/2020    Discharge Plan     Row Name 11/04/20 1438       Plan    Plan  Follow up for discharge planning, palliative meeting with family today. Will assist with discharge needs as appropriate.        Discharge Codes    No documentation.       Expected Discharge Date and Time     Expected Discharge Date Expected Discharge Time    Nov 4, 2020             Anne Siddiqi LCSW

## 2020-11-04 NOTE — PROGRESS NOTES
Called and notified by nursing throughout the night regarding patient's low BP. He has been given IVF boluses intermittently.  CT a/p was obtained given his persistent dark diarrhea and it revealed possible pancreatitis.  C. difficile screen earlier in the day was negative. AM labs were drawn earlier to assess his kidney function and CBC which revealed a marked and persistent decrease in the hemoglobin as well as an incredible rise in BUN and worsening kidney function.  I also personally examined the patient at bedside, he is lethargic, is arousable, however falls back asleep and does not follow commands.  His abdomen exam is benign.    Acute blood loss anemia, suspected upper gastrointestinal bleeding  Shock, hemorrhagic  Lactic acidosis  Leukocytosis    -stop his Lovenox  -transfuse 2 units PRBCs now, administer Protonix, and general surgery consult; I personally discussed the case with Dr. Hernandez regarding urgent endoscopy and the plan is proceed in the morning.  -Imaging reveals pneumonia as well and in the context of his rising WBC, start IV vancomycin and Zosyn  -he may need to be intubated, but currently protecting his airway  -I attempted to contact family regarding GOC, but unable to reach them.    CC 53 min immediately bedside, performing chart review, coordinating medication administration, consulting colleagues, with the intent to reduce morbidity mortality, exclusive of procedural time

## 2020-11-04 NOTE — PROCEDURES
Central Venous Catheter Insertion Procedure Note    Date of Procedure:  November 4, 2020     Type: Right femoral*Central Venous Line placement    Reason: vascular access.  GI bleed, shock    Consent: Informed consent was obtained for the procedure, including sedation. Consent was obtained from the Patient's family after explanation of the risks and benefits of the procedure. Risks including but not limited to damage to the overlying structures, pneumothorax, bleeding, infection, worsening of respiratory status, requirement for chest tube placement among others were explained.    Patient's family understood the risks and benefits and agreed to proceed with the procedure    Time Out: Time out was done with the RN at the bedside after confirmation of the name and date of birth with the patient's ID band    Details of the procedure:   Patient was appropriately positioned and the site was prepared. Maximum sterile technique was used including usual patient drapes, antiseptic technique and physician sterile garments.    The Right side of the groin was prepared in a sterile fashion and the site was inspected with ultrasound under sterile technique.     1% local anesthesia was applied to the skin and subcutaneous tissues with lidocaine 1%. The needle was then inserted into the vein. A guide wire was then easily passed through the needle. Central line was then inserted into the vessel over the guide wire. The catheter was sutured into place and dressed following sterile protocol and sterile dressing applied.    Central line was immediately accessed    Dressing change and nursing care, to be done as per nursing protocol.      Findings:  There were no changes to vital signs.   All catheter ports were flushed with saline.   Patient did tolerate procedure well.       Complications:  No immediate complications noted.      Recommendations:  Since he is a CoVid 19 rule out and ultrasound was used to place the central line, we will  go ahead and start using the line and obtain chest x-ray tomorrow morning, as a part of routine work-up.      It should be noted that routine chest x-ray has been shown, in multiple studies, to be unnecessary after ultrasound-guided central line placement.    No chest x-ray ordered if this was a femoral vein.    Daily assessment of the continued need of the Central Line to be assessed by ordering provider/attending physician.    @[facility]@

## 2020-11-04 NOTE — CONSULTS
"General Surgery Consult     Name:Noe Martel  Age: 85 y.o.  Gender: male  : 1935  MRN: [id number]  Visit Number: [id number]  Admit Date: 10/23/2020  Date of Service: 20    Patient Care Team:  Devika Foy APRN as PCP - General (Family Medicine)    Reason for Consultation: Acute blood loss anemia, suspected upper GI bleeding    Chief complaint : Black stool      History of Present Illness:     Noe Martel is a 85 y.o. male patient with multiple medical comorbidities including hypertension, COPD, coronary artery disease, and dyslipidemia, who was admitted by the hospitalist service on 10/23/2020 after he presented with progressive weakness and aphasia shortly after being diagnosed with COVID-19.  He was initially admitted to the intensive care unit and treated with broad-spectrum IV antibiotic therapy, IV steroids, and IV fluids.  He also received a full course of remdesivir, which completed on 10/30/2020.  He had been improving and was on the regular floor pending discharge home with home health, when he developed hypotension, and persistent, \"dark-colored\" diarrhea.  Apparently the diarrhea had been ongoing and C. difficile testing earlier in the day yesterday had been negative.  A full set of labs was obtained overnight to due to his change in status and it was notable for a decline in his hemoglobin to 8, from 10.1 the day prior, 12 two days prior, and normal on admission.  Notably, his white blood cell count increased from 21.8 to 36.8.  Unfortunately, no differential was done.  His lactic acid level was normal.  At his comprehensive metabolic panel revealed a worsening BUN and creatinine, a bicarb of 18, elevated sodium, elevated chloride, and a decreased calcium level.  His procalcitonin was slightly elevated at 0.28.    A chest x-ray revealed right greater than left apical opacities that were felt to be unchanged.  CT scan of the abdomen pelvis done without contrast revealed multiple " bilateral pulmonary nodules with bibasilar atelectasis.  Limited images of the liver, spleen, adrenal, were unremarkable.  There was very mild peripancreatic inflammatory change.  No free fluid, adenopathy, or free air was seen.  There was noted to be bilateral perinephric stranding.  No nephrolithiasis.  The colon was notable for diverticulosis in the sigmoid without diverticulitis.  Fecal impaction was noted in the rectal vault.  The bladder was unremarkable.    The patient was ultimately transferred to the intensive care unit, and was placed on a Protonix infusion.  2 units of packed red blood cells were ordered, and he was started on broad-spectrum antibiotic therapy with IV Zosyn and vancomycin.  Due to the drop in his hemoglobin, and the reported dark stools, I was asked to see the patient consultation due to concern for an upper GI bleed.    The overnight nurse reports that the patient had 2 dark black stools overnight.  He is unfortunately just now receiving his first unit of packed red blood cells due to difficulties with IV access.  The patient has been on Levophed due to his persistent hypotension while awaiting blood transfusion.  He is now maxed out.  Per the nursing staff, his blood pressure has been marginal, with systolics only in the 90s despite Levophed.  He is also noted to become more hemodynamically unstable with turning.    Patient Active Problem List   Diagnosis   • Benign prostatic hyperplasia   • Hypertension   • CAD (coronary artery disease)   • Dyslipidemia   • Chronic back pain   • GERD (gastroesophageal reflux disease)   • COPD (chronic obstructive pulmonary disease) (CMS/HCC)   • COVID-19   • Acute respiratory failure with hypoxia (CMS/HCC)   • Sepsis, unspecified organism (CMS/HCC)         Past Medical History:   Diagnosis Date   • BPH (benign prostatic hyperplasia)    • CAD (coronary artery disease)     status post drug-eluting stent deployment in the ostium of the right coronary  artery and balloon angioplasty of the obtuse marginal branch of the left circumflex, 07/29/2015.  Ejection fraction 60%.   • Chronic back pain    • COPD (chronic obstructive pulmonary disease) (CMS/HCC)    • Dyslipidemia      untreated.    • GERD (gastroesophageal reflux disease)    • Hyperlipidemia    • Hypertension    • Impaired functional mobility, balance, gait, and endurance        Past Surgical History:   Procedure Laterality Date   • CARDIAC SURGERY      STENT PLACEMENT   • CHOLECYSTECTOMY     • HERNIA REPAIR      bilateral       History reviewed. No pertinent family history.    Social History     Socioeconomic History   • Marital status:      Spouse name: Not on file   • Number of children: Not on file   • Years of education: Not on file   • Highest education level: Not on file   Occupational History     Employer: RETIRED   Tobacco Use   • Smoking status: Never Smoker   Substance and Sexual Activity   • Alcohol use: No         Current Facility-Administered Medications:   •  albuterol sulfate HFA (PROVENTIL HFA;VENTOLIN HFA;PROAIR HFA) inhaler 2 puff, 2 puff, Inhalation, Q4H PRN, Miguel A Santos, DO  •  aspirin chewable tablet 81 mg, 81 mg, Oral, Daily, Miguel A Santos, DO, 81 mg at 11/03/20 0830  •  budesonide-formoterol (SYMBICORT) 160-4.5 MCG/ACT inhaler 2 puff, 2 puff, Inhalation, BID - RT, Miguel A Santos, DO, 2 puff at 11/03/20 0830  •  dextrose (D50W) 25 g/ 50mL Intravenous Solution 25 g, 25 g, Intravenous, Q15 Min PRN, Jasen Santosar, DO  •  dextrose (GLUTOSE) oral gel 1 tube, 1 tube, Oral, Q15 Min PRN, Miguel A Santos, DO  •  glucagon (human recombinant) (GLUCAGEN DIAGNOSTIC) injection 1 mg, 1 mg, Subcutaneous, PRN, Jasen Santosar, DO  •  insulin aspart (novoLOG) injection 0-7 Units, 0-7 Units, Subcutaneous, TID AC, Miguel A Santos, DO, 2 Units at 11/04/20 0646  •  lactated ringers infusion, 200 mL/hr, Intravenous, Continuous, Miguel A Santos, DO, Last Rate: 200 mL/hr at 11/04/20 0441, 200 mL/hr at 11/04/20 0441  •   metoprolol tartrate (LOPRESSOR) injection 2.5 mg, 2.5 mg, Intravenous, Q6H PRN, Danielle, Umar, DO  •  metoprolol tartrate (LOPRESSOR) tablet 25 mg, 25 mg, Oral, Q12H, Danielle, Umar, DO, 25 mg at 11/03/20 0830  •  mirtazapine (REMERON) tablet 15 mg, 15 mg, Oral, Nightly, Danielle, Umar, DO, 15 mg at 11/02/20 2115  •  montelukast (SINGULAIR) tablet 10 mg, 10 mg, Oral, Nightly, Danielle, Umar, DO, 10 mg at 11/02/20 2114  •  norepinephrine (LEVOPHED) 8 mg in 250 mL NS infusion (premix), 0.02-0.3 mcg/kg/min, Intravenous, Titrated, Danielle, Umar, DO, Last Rate: 44.9 mL/hr at 11/04/20 0630, 0.3 mcg/kg/min at 11/04/20 0630  •  nystatin (MYCOSTATIN) 110634 UNIT/ML suspension 500,000 Units, 5 mL, Oral, 4x Daily, Danielle, Umar, DO, 500,000 Units at 11/03/20 1800  •  Pharmacy Consult - Remdesivir, , Does not apply, Continuous PRN, Danielle, Umar, DO  •  Pharmacy to dose vancomycin, , Does not apply, Continuous PRN, Danielle, Umar, DO  •  piperacillin-tazobactam (ZOSYN) 3.375 g in iso-osmotic dextrose 50 ml (premix), 3.375 g, Intravenous, Q8H, Danielle, Umar, DO, 3.375 g at 11/04/20 0724  •  PRO-STAT oral liquid 30 mL, 30 mL, Oral, BID, Danielle, Umar, DO, 30 mL at 11/03/20 0830  •  sodium chloride 0.9 % flush 10 mL, 10 mL, Intravenous, Q12H, Danielle, Umar, DO, 10 mL at 11/03/20 0831  •  sodium chloride 0.9 % flush 10 mL, 10 mL, Intravenous, PRN, Danielle, Umar, DO, 10 mL at 10/26/20 0856  •  terazosin (HYTRIN) capsule 5 mg, 5 mg, Oral, Nightly, Miguel A Santos DO, 5 mg at 11/02/20 2114  •  [START ON 11/5/2020] vancomycin 1000 mg in sodium chloride 0.9% 250 mL IVPB, 1,000 mg, Intravenous, Q48H, Miguel A Santos DO    Medications Prior to Admission   Medication Sig Dispense Refill Last Dose   • acetaminophen (TYLENOL) 325 MG tablet Take 650 mg by mouth Every 6 (Six) Hours As Needed for Mild Pain .      • albuterol sulfate  (90 Base) MCG/ACT inhaler Inhale 2 puffs Every 4 (Four) Hours As Needed for Wheezing.      • amLODIPine (NORVASC) 10 MG  tablet TAKE 1 TABLET BY MOUTH EVERY DAY      • aspirin 81 MG chewable tablet Take 81 mg by mouth daily.        • budesonide-formoterol (SYMBICORT) 160-4.5 MCG/ACT inhaler Inhale 2 puffs 2 (Two) Times a Day.      • cyanocobalamin 1000 MCG/ML injection Inject 1,000 mcg into the appropriate muscle as directed by prescriber 1 (One) Time. Due on October 28 9/28/2020   • lisinopril (PRINIVIL,ZESTRIL) 40 MG tablet Take 40 mg by mouth Daily.      • montelukast (SINGULAIR) 10 MG tablet Take 10 mg by mouth Every Night.      • nitroglycerin (NITROSTAT) 0.4 MG SL tablet       • omeprazole (PriLOSEC) 20 MG capsule TAKE 1 CAPSULE BY MOUTH DAILY. REPLACES FAMOTIDINE      • terazosin (HYTRIN) 5 MG capsule Take 5 mg by mouth Every Night.          Allergies   Allergen Reactions   • Sulfa Antibiotics    • Sulfur        Review of Systems   Unable to perform ROS: Patient unresponsive       OBJECTIVE:     Vital Signs  Temp:  [96.5 °F (35.8 °C)-98.7 °F (37.1 °C)] 97.7 °F (36.5 °C)  Heart Rate:  [] 112  Resp:  [15-22] 15  BP: ()/(40-97) 121/95    No intake/output data recorded.  I/O last 3 completed shifts:  In: 4557 [P.O.:180; I.V.:4327; IV Piggyback:50]  Out: -       Physical Exam:     General Appearance:   Lying in bed, lethargic, turns head to voice, but does not open eyes or answer questions.   Head:    Normocephalic, without obvious abnormality, atraumatic   Eyes:            Lids and lashes normal   Ears:    Ears appear intact with no abnormalities noted       Lungs:     No respiratory distress, respirations regular, even and                  unlabored    Heart:    Regular rhythm and normal rate       Abdomen:    Soft, nontender, nondistended, no peritoneal signs   Rectal:     Deferred due to patient's hemodynamic status and noted worsening of his hemodynamics with turning   Extremities:  No cyanosis.   Neurologic:  Lethargic, does not follow commands, does not open eyes.  Moves extremities spontaneously, turns head to  voice.       Results Review:  I have reviewed the entirety of the patient's clinical lab results.  I have also personally reviewed the patient's imaging    Ct Abdomen Pelvis Without Contrast    Result Date: 11/3/2020  FINAL REPORT TECHNIQUE: Axial images through the abdomen and pelvis were performed without contrast. This study was performed with techniques to keep radiation doses as low as reasonably achievable, (ALARA). Individualized dose reduction techniques using automated exposure control or adjustment of mA and/or kV according to the patient's size were employed. CLINICAL HISTORY: diarrhea FINDINGS: ABDOMEN: Imaging of the lungs demonstrate an 8mm nodule in the medial right lower lobe, a 5 mm lingular nodule and a 10 mm nodule in the right lower lobe.  There is bibasilar atelectasis. The heart size is normal.  Patient is status post cholecystectomy.  Limited images of the liver are unremarkable. The spleen is normal.  No adrenal mass is identified.  There is mild peripancreatic inflammatory change.  The aorta is normal in caliber.  There is no significant free fluid or adenopathy. There is bilateral perinephric stranding. There is no nephrolithiasis.  There is no hydronephrosis.  PELVIS: The appendix is not identified.  There are diverticula of the sigmoid colon without evidence of diverticulitis.  Fecal impaction is seen at the rectal vault. The urinary bladder is unremarkable.  There is no significant free fluid or adenopathy.     Multiple bilateral pulmonary nodules.  Recommend three-month followup chest CT.  Questionable, mild pancreatitis.  Fecal impaction at the rectal vault. Authenticated by Anastacio Bridges MD on 11/03/2020 11:44:10 PM        Xr Chest 1 View    Result Date: 11/3/2020  PROCEDURE: XR CHEST 1 VW-  HISTORY: Leukocytosis; U07.1-COVID-19; J18.9-Pneumonia, unspecified organism; J96.21-Acute and chronic respiratory failure with hypoxia; N17.9-Acute kidney failure, unspecified;  R74.01-Elevation of levels of liver transaminase levels; J96.01-Acute respiratory failure with hypoxia; J44.9-Chronic obstructive pulmonary disease, unspecified  COMPARISON: 10/28/2020.  FINDINGS: The heart is normal in size. The mediastinum is unremarkable. Opacities in the right greater than left lung apices are unchanged.. There are no effusions. There is no pneumothorax.  There are no acute osseous abnormalities.      Right greater than left apical opacities which are unchanged.  Continued followup is recommended.  This report was finalized on 11/3/2020 3:58 PM by Jolene Stratton M.D..          Lab Results (last 72 hours)     Procedure Component Value Units Date/Time    POC Glucose Once [[id number]]  (Abnormal) Collected: 11/04/20 0642    Specimen: Blood Updated: 11/04/20 0719     Glucose 178 mg/dL      Comment: Serial Number: [id number]:  [id number]       Lactic Acid, Reflex [[id number]]  (Normal) Collected: 11/04/20 0426    Specimen: Blood Updated: 11/04/20 0445     Lactate 1.9 mmol/L     MRSA Screen, PCR (Inpatient) - Swab, Nares [[id number]] Collected: 11/04/20 0431    Specimen: Swab from Nares Updated: 11/04/20 0440    Lactic Acid, Reflex Timer (This will reflex a repeat order 3-3:15 hours after ordered.) [[id number]] Collected: 11/04/20 0033    Specimen: Blood Updated: 11/04/20 0415     Hold Tube Hold for add-ons.     Comment: Auto resulted.       Blood Gas, Arterial With Co-Ox [[id number]]  (Abnormal) Collected: 11/04/20 0337    Specimen: Arterial Blood Updated: 11/04/20 0338     Site Right Radial     Isidro's Test Positive     pH, Arterial 7.463 pH units      Comment: 83 Value above reference range        pCO2, Arterial 22.1 mm Hg      Comment: 84 Value below reference range        pO2, Arterial 61.8 mm Hg      Comment: 84 Value below reference range        HCO3, Arterial 15.8 mmol/L      Comment: 84 Value below reference range        Base Excess, Arterial -7.0 mmol/L      Comment: 84 Value below  reference range        O2 Saturation, Arterial 93.3 %      Comment: 84 Value below reference range        Hematocrit, Blood Gas 22.5 %      Comment: 84 Value below reference range        Oxyhemoglobin 92.1 %      Comment: 84 Value below reference range        Methemoglobin 0.50 %      Carboxyhemoglobin 0.8 %      A-a Gradiant 58.8 mmHg      Barometric Pressure for Blood Gas 740 mmHg      Modality N/A     FIO2 21 %      Ventilator Mode NA     Comment: Meter: [id number]     :  [id number]        Note --     pH, Temp Corrected --     pCO2, Temperature Corrected --     pO2, Temperature Corrected --    Lipase [[id number]]  (Normal) Collected: 11/04/20 0033    Specimen: Blood Updated: 11/04/20 0337     Lipase 27 U/L     Hemoglobin A1c [[id number]]  (Abnormal) Collected: 11/04/20 0033    Specimen: Blood Updated: 11/04/20 0138     Hemoglobin A1C 6.70 %     Narrative:      Hemoglobin A1C Ranges:    Increased Risk for Diabetes  5.7% to 6.4%  Diabetes                     >= 6.5%  Diabetic Goal                < 7.0%    Lactic Acid, Plasma [[id number]]  (Abnormal) Collected: 11/04/20 0033    Specimen: Blood Updated: 11/04/20 0111     Lactate 2.8 mmol/L     Comprehensive Metabolic Panel [[id number]]  (Abnormal) Collected: 11/04/20 0033    Specimen: Blood Updated: 11/04/20 0100     Glucose 150 mg/dL       mg/dL      Creatinine 2.42 mg/dL      Sodium 148 mmol/L      Potassium 4.6 mmol/L      Chloride 116 mmol/L      CO2 18.0 mmol/L      Calcium 6.8 mg/dL      Total Protein 4.2 g/dL      Albumin 2.10 g/dL      ALT (SGPT) 20 U/L      AST (SGOT) 18 U/L      Alkaline Phosphatase 42 U/L      Total Bilirubin 0.4 mg/dL      eGFR Non African Amer 26 mL/min/1.73      Globulin 2.1 gm/dL      A/G Ratio 1.0 g/dL      BUN/Creatinine Ratio 47.5     Anion Gap 14.0 mmol/L     Narrative:      GFR Normal >60  Chronic Kidney Disease <60  Kidney Failure <15      CK [[id number]]  (Abnormal) Collected: 11/04/20 0033    Specimen: Blood  "Updated: 11/04/20 0055     Creatine Kinase 245 U/L     CBC (No Diff) [[id number]]  (Abnormal) Collected: 11/04/20 0033    Specimen: Blood Updated: 11/04/20 0047     WBC 36.85 10*3/mm3      RBC 2.68 10*6/mm3      Hemoglobin 8.0 g/dL      Hematocrit 24.8 %      MCV 92.5 fL      MCH 29.9 pg      MCHC 32.3 g/dL      RDW 13.7 %      RDW-SD 46.7 fl      MPV 12.0 fL      Platelets 208 10*3/mm3     Procalcitonin [[id number]]  (Abnormal) Collected: 11/03/20 2146    Specimen: Blood from Arm, Left Updated: 11/03/20 2238     Procalcitonin 0.28 ng/mL     Narrative:      As a Marker for Sepsis (Non-Neonates):   1. <0.5 ng/mL represents a low risk of severe sepsis and/or septic shock.  1. >2 ng/mL represents a high risk of severe sepsis and/or septic shock.    As a Marker for Lower Respiratory Tract Infections that require antibiotic therapy:  PCT on Admission     Antibiotic Therapy             6-12 Hrs later  > 0.5                Strongly Recommended            >0.25 - <0.5         Recommended  0.1 - 0.25           Discouraged                   Remeasure/reassess PCT  <0.1                 Strongly Discouraged          Remeasure/reassess PCT      As 28 day mortality risk marker: \"Change in Procalcitonin Result\" (> 80 % or <=80 %) if Day 0 (or Day 1) and Day 4 values are available. Refer to [email or web address]   Change in PCT <=80 %   A decrease of PCT levels below or equal to 80 % defines a positive change in PCT test result representing a higher risk for 28-day all-cause mortality of patients diagnosed with severe sepsis or septic shock.  Change in PCT > 80 %   A decrease of PCT levels of more than 80 % defines a negative change in PCT result representing a lower risk for 28-day all-cause mortality of patients diagnosed with severe sepsis or septic shock.                Results may be falsely decreased if patient taking Biotin.     POC Glucose Once [[id number]]  (Abnormal) Collected: 11/03/20 2106    Specimen: " Blood Updated: 11/03/20 2109     Glucose 153 mg/dL      Comment: Serial Number: [id number]:  [id number]       Clostridium Difficile Toxin - Stool, Per Rectum [[id number]]  (Normal) Collected: 11/03/20 1514    Specimen: Stool from Per Rectum Updated: 11/03/20 1616    Narrative:      The following orders were created for panel order Clostridium Difficile Toxin - Stool, Per Rectum.  Procedure                               Abnormality         Status                     ---------                               -----------         ------                     Clostridium Difficile EI...[[id number]]  Normal              Final result                 Please view results for these tests on the individual orders.    Clostridium Difficile EIA - Stool, Per Rectum [[id number]]  (Normal) Collected: 11/03/20 1514    Specimen: Stool from Per Rectum Updated: 11/03/20 1616     C Diff GDH / Toxin Negative    POC Glucose Once [[id number]]  (Abnormal) Collected: 11/03/20 1609    Specimen: Blood Updated: 11/03/20 1614     Glucose 165 mg/dL      Comment: Serial Number: [id number]:  [id number]       POC Glucose Once [[id number]]  (Abnormal) Collected: 11/03/20 1115    Specimen: Blood Updated: 11/03/20 1151     Glucose 223 mg/dL      Comment: Serial Number: [id number]:  [id number]       Renal Function Panel [[id number]]  (Abnormal) Collected: 11/03/20 0628    Specimen: Blood Updated: 11/03/20 0743     Glucose 248 mg/dL      BUN 97 mg/dL      Creatinine 1.48 mg/dL      Sodium 145 mmol/L      Potassium 4.6 mmol/L      Chloride 113 mmol/L      CO2 20.8 mmol/L      Calcium 7.5 mg/dL      Albumin 2.40 g/dL      Phosphorus 3.1 mg/dL      Anion Gap 11.2 mmol/L      BUN/Creatinine Ratio 65.5     eGFR Non African Amer 45 mL/min/1.73     Narrative:      GFR Normal >60  Chronic Kidney Disease <60  Kidney Failure <15      CBC (No Diff) [[id number]]  (Abnormal) Collected: 11/03/20 0627    Specimen: Blood Updated: 11/03/20 0721     WBC 21.89 10*3/mm3       RBC 3.39 10*6/mm3      Hemoglobin 10.1 g/dL      Hematocrit 31.1 %      MCV 91.7 fL      MCH 29.8 pg      MCHC 32.5 g/dL      RDW 13.4 %      RDW-SD 44.9 fl      MPV 12.3 fL      Platelets 221 10*3/mm3     POC Glucose Once [[id number]]  (Abnormal) Collected: 11/03/20 0640    Specimen: Blood Updated: 11/03/20 0648     Glucose 204 mg/dL      Comment: Serial Number: [id number]:  [id number]       POC Glucose Once [[id number]]  (Abnormal) Collected: 11/03/20 0605    Specimen: Blood Updated: 11/03/20 0611     Glucose 245 mg/dL      Comment: Serial Number: [id number]:  [id number]       POC Glucose Once [[id number]]  (Normal) Collected: 11/02/20 2003    Specimen: Blood Updated: 11/02/20 2009     Glucose 124 mg/dL      Comment: Serial Number: [id number]:  [id number]       POC Glucose Once [[id number]]  (Normal) Collected: 11/02/20 1552    Specimen: Blood Updated: 11/02/20 1601     Glucose 116 mg/dL      Comment: Serial Number: [id number]:  [id number]       POC Glucose Once [[id number]]  (Normal) Collected: 11/02/20 1147    Specimen: Blood Updated: 11/02/20 1237     Glucose 109 mg/dL      Comment: Serial Number: [id number]:  [id number]       Renal Function Panel [[id number]]  (Abnormal) Collected: 11/02/20 0630    Specimen: Blood Updated: 11/02/20 0709     Glucose 117 mg/dL      BUN 43 mg/dL      Creatinine 0.98 mg/dL      Sodium 143 mmol/L      Potassium 4.2 mmol/L      Chloride 110 mmol/L      CO2 24.4 mmol/L      Calcium 7.2 mg/dL      Albumin 2.30 g/dL      Phosphorus 2.3 mg/dL      Anion Gap 8.6 mmol/L      BUN/Creatinine Ratio 43.9     eGFR Non African Amer 73 mL/min/1.73     Narrative:      GFR Normal >60  Chronic Kidney Disease <60  Kidney Failure <15      CBC (No Diff) [[id number]]  (Abnormal) Collected: 11/02/20 0630    Specimen: Blood Updated: 11/02/20 0647     WBC 13.99 10*3/mm3      RBC 4.12 10*6/mm3      Hemoglobin 12.0 g/dL      Hematocrit 37.5 %      MCV 91.0 fL      MCH 29.1 pg      MCHC 32.0  g/dL      RDW 13.2 %      RDW-SD 44.2 fl      MPV 11.9 fL      Platelets 200 10*3/mm3     POC Glucose Once [[id number]]  (Normal) Collected: 11/02/20 0604    Specimen: Blood Updated: 11/02/20 0607     Glucose 109 mg/dL      Comment: Serial Number: [id number]:  [id number]       POC Glucose Once [[id number]]  (Normal) Collected: 11/01/20 1608    Specimen: Blood Updated: 11/01/20 2043     Glucose 118 mg/dL      Comment: Serial Number: [id number]:  [id number]       POC Glucose Once [[id number]]  (Abnormal) Collected: 11/01/20 1954    Specimen: Blood Updated: 11/01/20 1959     Glucose 144 mg/dL      Comment: Serial Number: [id number]:  [id number]       POC Glucose Once [[id number]]  (Abnormal) Collected: 11/01/20 1053    Specimen: Blood Updated: 11/01/20 1105     Glucose 150 mg/dL      Comment: Serial Number: [id number]:  [id number]       Renal Function Panel [[id number]]  (Abnormal) Collected: 11/01/20 0750    Specimen: Blood Updated: 11/01/20 0815     Glucose 96 mg/dL      BUN 40 mg/dL      Creatinine 1.06 mg/dL      Sodium 141 mmol/L      Potassium 4.2 mmol/L      Chloride 107 mmol/L      CO2 24.0 mmol/L      Calcium 7.3 mg/dL      Albumin 2.40 g/dL      Phosphorus 2.3 mg/dL      Anion Gap 10.0 mmol/L      BUN/Creatinine Ratio 37.7     eGFR Non African Amer 67 mL/min/1.73     Narrative:      GFR Normal >60  Chronic Kidney Disease <60  Kidney Failure <15                            ASSESSMENT/PLAN:      Acute respiratory failure with hypoxia (CMS/HCC)    Hypertension    CAD (coronary artery disease)    GERD (gastroesophageal reflux disease)    COVID-19    Sepsis, unspecified organism (CMS/[facility])    Mr. Martel is an 85-year-old gentleman admitted with acute hypoxic respiratory failure due to patchy, bilateral COVID-19 pneumonia, transferred to the ICU overnight with an overall worsening of his clinical condition.  His overall clinical picture would suggest sepsis with shock, likely due to his underlying Covid  pneumonia.  I am very concerned about his markedly elevated white blood cell count, tachycardia, hypotension, worsening renal failure, and elevated pro-Alphonso levels.  Although he has dropped his hemoglobin, and has had a few dark stools, which would suggest an upper GI bleed, I do not think this is the singular process driving his overall decline.  Certainly, he needs transfusion of packed red blood cells as quickly as possible for resuscitation purposes.  Additionally, I would continue the Protonix infusion and keep him n.p.o.  I would agree with broad-spectrum antibiotic coverage.  I am happy to perform an upper endoscopy at the bedside in the intensive care unit for further investigation of a potential upper GI bleed, however, I think this is best deferred until the patient can be stabilized and properly resuscitated given that there is no evidence of zuleima hemorrhage.  I have discussed this with the patient's attending hospitalist, who is in agreement that EGD should be deferred for now.  I will continue to follow the patient with you for now.  Please call with any acute change in his status with regards to GI bleeding.        Mayte Hernandez MD  11/04/20  08:02 EST

## 2020-11-04 NOTE — NURSING NOTE
Was seen today for wound consult. Noted to have right heel deep tissue injury that recommend keeping bordered foam dressing in place and use of heel lift boots to protect from further skin breakdown and wound healing. Especially with use of vasoconstricting medications. Noted to also have deep tissue area to right buttock and unstageable coccyx area, Initially documented as bilateral gluteal pressure injury. Staff reports is having incontinence of stool.  Recommend keeping these areas clean then applying barrier ointment after incontinent episodes. Nutrition has seen patient and made recommendations. However patient does not interact and does not present as alert enough for PO intake. Wound care and pressure reduction are recommended based on best practices. However until nutrition improves healing will be difficulty to obtain. Thank you for the consult. If you have further needs in relation to this patient I would be glad to assist.

## 2020-11-04 NOTE — PLAN OF CARE
Goal Outcome Evaluation:  Plan of Care Reviewed With: patient, spouse, son  Progress: improving  Outcome Summary: Pt received 2 units [id number] H&H was stable, no further bleeding noted; Pt opens eyes and follows minimal commands, opened eyes while family visited, but did not talk with them. Afebrile, VSS, weaning down levophed drip as BP tolerated.

## 2020-11-04 NOTE — PROGRESS NOTES
PC Nurse consult received for Goals of Care Discussion/ACP.  Pt is currently in COVID 19 isolation.  He is a FULL Code and does not have an Advance Directive.    Admitted 10/23/2020 with progressive weakness and aphasic.  Son was COVID positive.  O2 sats 68% RA and 90% on NRM.  Admission Assessment:  Sepsis, Community Acquired PN, COVID -19 PN, Acuter Hypoxic resp failure, Acute Metabolic Encephalopathy, elevated Troponin, SANG vs CKD.     Hx:  CAD, COPD, HTN, HLD, GERD.    Pt had improved and was moved to a Med/Surg unit.  On 11/4/2020 pt experienced episode of hypotension, dark diarrhea. CT revealed poss pancreatitis.  Pt is currently on Levophed drip--maxed out.    Dr Shane requested meeting with family to discuss pt's current condition and Code Status.  Meeting scheduled for 3pm.  PC nurse met with pt's spouse and son briefly before Dr Shane arrived.  I explained I was with the Palliative Care team and our job is to make sure pts are comfortable and to make certain we are following their wishes--not doing more and not doing less than what they would want.    Before the discussion I asked the spouse and son a little about the pt.  Spouse is [place] and defers to son answering at times.  I encourage her to ask us to repeat things if she cannot hear.  They related pt was able to walk with a walker prior to illness.  They reported he did not have any problems communicating.    Pt's son reported if they still recommended short term rehab they found a facility that had a bed.  [facility], [facility].  I informed him I would get the information after the meeting.    I explained when I referred to following a pt's wishes I referred to medical treatment, specifically if his heart was to stop or he stopped breathing.  The question presented was would the pt want to be resuscitated in that event--specifically compressions on his chest.  Spouse stated she thinks he would want that.  I explained typically if the heart  "stops, that means the body is failing and with his other issues, if he survived he would not likely be in any better shape then he is now and possibly worse.    I explained this is something for them to think about BEFORE a situation arises when it is very difficult to make a decision.  Son agreed they needed to talk about it.    Dr Shane joined the meeting.  He reviewed with them pt's current condition.  He informed them that pt is not improving and possibly worse.   He explained when a pt is as sick as he is and considering his age, it was time to consider what would pt want if his heart stops.  Would he want to be resuscitated with compressions that might break his ribs?  Spouse related \"maybe it won't break his ribs\".    Dr Shane informed them if pt was to stop breathing or needed a breathing tube, he felt it would be appropriate to place a breathing tube since pt has pneumonia and can be treated.  He stressed he was only referred to if his heart stopped.   He informed them they did not need to make a decision tonight but encouraged them to discuss this at home.  Pt's son related there is another brother, Noe Martel III, and they would include him in the conversation.    Dr Shane checked and the nurse would assist spouse and son to put PPE on to visit pt. I updated Eva RN, pt's primary nurse.      "

## 2020-11-05 LAB
ALBUMIN SERPL-MCNC: 2.3 G/DL (ref 3.5–5.2)
ALBUMIN/GLOB SERPL: 1.4 G/DL
ALP SERPL-CCNC: 58 U/L (ref 39–117)
ALT SERPL W P-5'-P-CCNC: 19 U/L (ref 1–41)
ANION GAP SERPL CALCULATED.3IONS-SCNC: 9 MMOL/L (ref 5–15)
AST SERPL-CCNC: 16 U/L (ref 1–40)
BH BB BLOOD EXPIRATION DATE: NORMAL
BH BB BLOOD EXPIRATION DATE: NORMAL
BH BB BLOOD TYPE BARCODE: 5100
BH BB BLOOD TYPE BARCODE: 5100
BH BB DISPENSE STATUS: NORMAL
BH BB DISPENSE STATUS: NORMAL
BH BB PRODUCT CODE: NORMAL
BH BB PRODUCT CODE: NORMAL
BH BB UNIT NUMBER: NORMAL
BH BB UNIT NUMBER: NORMAL
BILIRUB SERPL-MCNC: 0.6 MG/DL (ref 0–1.2)
BUN SERPL-MCNC: 75 MG/DL (ref 8–23)
BUN/CREAT SERPL: 36.8 (ref 7–25)
CALCIUM SPEC-SCNC: 6.7 MG/DL (ref 8.6–10.5)
CHLORIDE SERPL-SCNC: 127 MMOL/L (ref 98–107)
CO2 SERPL-SCNC: 17 MMOL/L (ref 22–29)
CREAT SERPL-MCNC: 2.04 MG/DL (ref 0.76–1.27)
CROSSMATCH INTERPRETATION: NORMAL
CROSSMATCH INTERPRETATION: NORMAL
D-LACTATE SERPL-SCNC: 2.8 MMOL/L (ref 0.5–2)
DEPRECATED RDW RBC AUTO: 54.7 FL (ref 37–54)
ERYTHROCYTE [DISTWIDTH] IN BLOOD BY AUTOMATED COUNT: 16.6 % (ref 12.3–15.4)
GFR SERPL CREATININE-BSD FRML MDRD: 31 ML/MIN/1.73
GLOBULIN UR ELPH-MCNC: 1.7 GM/DL
GLUCOSE BLDC GLUCOMTR-MCNC: 101 MG/DL (ref 70–130)
GLUCOSE BLDC GLUCOMTR-MCNC: 121 MG/DL (ref 70–130)
GLUCOSE BLDC GLUCOMTR-MCNC: 128 MG/DL (ref 70–130)
GLUCOSE BLDC GLUCOMTR-MCNC: 132 MG/DL (ref 70–130)
GLUCOSE BLDC GLUCOMTR-MCNC: 137 MG/DL (ref 70–130)
GLUCOSE SERPL-MCNC: 139 MG/DL (ref 65–99)
HCT VFR BLD AUTO: 28.2 % (ref 37.5–51)
HGB BLD-MCNC: 8.9 G/DL (ref 13–17.7)
MCH RBC QN AUTO: 28.8 PG (ref 26.6–33)
MCHC RBC AUTO-ENTMCNC: 31.6 G/DL (ref 31.5–35.7)
MCV RBC AUTO: 91.3 FL (ref 79–97)
PLATELET # BLD AUTO: 188 10*3/MM3 (ref 140–450)
PMV BLD AUTO: 11.9 FL (ref 6–12)
POTASSIUM SERPL-SCNC: 3.8 MMOL/L (ref 3.5–5.2)
PROCALCITONIN SERPL-MCNC: 0.14 NG/ML (ref 0–0.25)
PROT SERPL-MCNC: 4 G/DL (ref 6–8.5)
RBC # BLD AUTO: 3.09 10*6/MM3 (ref 4.14–5.8)
SODIUM SERPL-SCNC: 153 MMOL/L (ref 136–145)
TROPONIN T SERPL-MCNC: 0.04 NG/ML (ref 0–0.03)
UNIT  ABO: NORMAL
UNIT  ABO: NORMAL
UNIT  RH: NORMAL
UNIT  RH: NORMAL
VANCOMYCIN SERPL-MCNC: 9.7 MCG/ML (ref 5–40)
WBC # BLD AUTO: 27.32 10*3/MM3 (ref 3.4–10.8)

## 2020-11-05 PROCEDURE — 97164 PT RE-EVAL EST PLAN CARE: CPT

## 2020-11-05 PROCEDURE — 82962 GLUCOSE BLOOD TEST: CPT

## 2020-11-05 PROCEDURE — 83605 ASSAY OF LACTIC ACID: CPT | Performed by: INTERNAL MEDICINE

## 2020-11-05 PROCEDURE — 94640 AIRWAY INHALATION TREATMENT: CPT

## 2020-11-05 PROCEDURE — 94799 UNLISTED PULMONARY SVC/PX: CPT

## 2020-11-05 PROCEDURE — 99232 SBSQ HOSP IP/OBS MODERATE 35: CPT | Performed by: INTERNAL MEDICINE

## 2020-11-05 PROCEDURE — 85027 COMPLETE CBC AUTOMATED: CPT | Performed by: INTERNAL MEDICINE

## 2020-11-05 PROCEDURE — 80053 COMPREHEN METABOLIC PANEL: CPT | Performed by: INTERNAL MEDICINE

## 2020-11-05 PROCEDURE — 25010000002 PIPERACILLIN SOD-TAZOBACTAM PER 1 G: Performed by: INTERNAL MEDICINE

## 2020-11-05 PROCEDURE — 84484 ASSAY OF TROPONIN QUANT: CPT | Performed by: INTERNAL MEDICINE

## 2020-11-05 PROCEDURE — 25010000002 PIPERACILLIN SOD-TAZOBACTAM PER 1 G: Performed by: EMERGENCY MEDICINE

## 2020-11-05 PROCEDURE — 80202 ASSAY OF VANCOMYCIN: CPT | Performed by: EMERGENCY MEDICINE

## 2020-11-05 PROCEDURE — 84145 PROCALCITONIN (PCT): CPT | Performed by: INTERNAL MEDICINE

## 2020-11-05 RX ORDER — MULTIPLE VITAMINS W/ MINERALS TAB 9MG-400MCG
1 TAB ORAL DAILY
Status: DISCONTINUED | OUTPATIENT
Start: 2020-11-05 | End: 2020-11-12 | Stop reason: HOSPADM

## 2020-11-05 RX ORDER — QUETIAPINE FUMARATE 25 MG/1
25 TABLET, FILM COATED ORAL NIGHTLY
Status: DISCONTINUED | OUTPATIENT
Start: 2020-11-05 | End: 2020-11-09

## 2020-11-05 RX ADMIN — TAZOBACTAM SODIUM AND PIPERACILLIN SODIUM 3.38 G: 375; 3 INJECTION, SOLUTION INTRAVENOUS at 23:47

## 2020-11-05 RX ADMIN — QUETIAPINE FUMARATE 25 MG: 25 TABLET ORAL at 22:03

## 2020-11-05 RX ADMIN — BUDESONIDE AND FORMOTEROL FUMARATE DIHYDRATE 2 PUFF: 160; 4.5 AEROSOL RESPIRATORY (INHALATION) at 08:07

## 2020-11-05 RX ADMIN — BUDESONIDE AND FORMOTEROL FUMARATE DIHYDRATE 2 PUFF: 160; 4.5 AEROSOL RESPIRATORY (INHALATION) at 22:06

## 2020-11-05 RX ADMIN — Medication 30 ML: at 22:07

## 2020-11-05 RX ADMIN — ASPIRIN 81 MG CHEWABLE TABLET 81 MG: 81 TABLET CHEWABLE at 08:06

## 2020-11-05 RX ADMIN — DEXTROSE AND SODIUM CHLORIDE 100 ML/HR: 5; 900 INJECTION, SOLUTION INTRAVENOUS at 17:52

## 2020-11-05 RX ADMIN — Medication 30 ML: at 08:07

## 2020-11-05 RX ADMIN — NYSTATIN 500000 UNITS: 500000 SUSPENSION ORAL at 17:52

## 2020-11-05 RX ADMIN — DEXTROSE AND SODIUM CHLORIDE 100 ML/HR: 5; 900 INJECTION, SOLUTION INTRAVENOUS at 08:06

## 2020-11-05 RX ADMIN — SODIUM CHLORIDE, PRESERVATIVE FREE 10 ML: 5 INJECTION INTRAVENOUS at 22:04

## 2020-11-05 RX ADMIN — NYSTATIN 500000 UNITS: 500000 SUSPENSION ORAL at 11:18

## 2020-11-05 RX ADMIN — PANTOPRAZOLE SODIUM 40 MG: 40 INJECTION, POWDER, FOR SOLUTION INTRAVENOUS at 08:06

## 2020-11-05 RX ADMIN — Medication 0.18 MCG/KG/MIN: at 02:25

## 2020-11-05 RX ADMIN — TAZOBACTAM SODIUM AND PIPERACILLIN SODIUM 3.38 G: 375; 3 INJECTION, SOLUTION INTRAVENOUS at 15:47

## 2020-11-05 RX ADMIN — SODIUM CHLORIDE, PRESERVATIVE FREE 10 ML: 5 INJECTION INTRAVENOUS at 08:07

## 2020-11-05 RX ADMIN — BUDESONIDE AND FORMOTEROL FUMARATE DIHYDRATE 2 PUFF: 160; 4.5 AEROSOL RESPIRATORY (INHALATION) at 02:27

## 2020-11-05 RX ADMIN — NYSTATIN 500000 UNITS: 500000 SUSPENSION ORAL at 08:06

## 2020-11-05 RX ADMIN — PANTOPRAZOLE SODIUM 40 MG: 40 INJECTION, POWDER, FOR SOLUTION INTRAVENOUS at 22:09

## 2020-11-05 RX ADMIN — NYSTATIN 500000 UNITS: 500000 SUSPENSION ORAL at 22:05

## 2020-11-05 RX ADMIN — ALBUTEROL SULFATE 2 PUFF: 90 AEROSOL, METERED RESPIRATORY (INHALATION) at 16:22

## 2020-11-05 RX ADMIN — TAZOBACTAM SODIUM AND PIPERACILLIN SODIUM 3.38 G: 375; 3 INJECTION, SOLUTION INTRAVENOUS at 08:06

## 2020-11-05 RX ADMIN — TAZOBACTAM SODIUM AND PIPERACILLIN SODIUM 3.38 G: 375; 3 INJECTION, SOLUTION INTRAVENOUS at 00:18

## 2020-11-05 NOTE — DISCHARGE PLACEMENT REQUEST
"Priti Martel (85 y.o. Male)     Date of Birth Social Security Number Address Home Phone MRN    1935  [address]   [facility] [id number] [phone] [id number]    [employer] Status          None        Admission Date Admission Type Admitting Provider Attending Provider Department, Room/Bed    10/23/20 Emergency Sushil Shane MD Pais, Roshan, MD [facility] INTENSIVE CARE, I03/1    Discharge Date Discharge Disposition Discharge Destination                       Attending Provider: Sushil Shane MD    Allergies: Sulfa Antibiotics, Sulfur    Isolation: Enh Drop/Con, Contact Air, Spore   Infection: COVID (confirmed) (10/23/20)   Code Status: CPR    Ht: 175.3 cm (69\")   Wt: 78.1 kg (172 lb 2.9 oz)    Admission Cmt: None   Principal Problem: Acute respiratory failure with hypoxia (CMS/HCC) [J96.01]                 Active Insurance as of 10/23/2020     Primary Coverage     Payor Plan Insurance Group Employer/Plan Group    HUMANA MEDICARE REPLACEMENT HUMANA MEDICARE REPLACEMENT [id number]     Payor Plan Address Payor Plan Phone Number Payor Plan Fax Number Effective Dates    [address] [address] [phone]  1/1/2013 - None Entered    [facility] [id number]       Subscriber Name Subscriber Birth Date Member ID       PRITI MARTEL 1935 [id number]           Secondary Coverage     Payor Plan Insurance Group Employer/Plan Group    [facility] DE[id number]      Payor Plan Address Payor Plan Phone Number Payor Plan Fax Number Effective Dates    [facility] OFFICE OF [facility] CARE [phone]  10/21/2020 - None Entered    [address] [address]       [facility] [address]       Subscriber Name Subscriber Birth Date Member ID       PRITI MARTEL 1935 [id number]                 Emergency Contacts      (Rel.) Home Phone Work Phone Mobile Phone    Oral Martel (Son) [phone] -- --    PATRICK MARTEL (Spouse) [phone] -- --    Jalen Martel (Son) -- -- [phone]    "         Emergency Contact Information     Name Relation Home Work Mobile    Oral Martel Son [phone]      PATRICK MARTEL Spouse [phone]      Jalen Martel Son   [phone]          Insurance Information                HUMANA MEDICARE REPLACEMENT/HUMANA MEDICARE REPLACEMENT Phone: [phone]    Subscriber: Noe Martel Subscriber#: [id number]    Group#: [id number] Precert#:         [employer] DE[facility] Phone: [phone]    Subscriber: Noe Martel Subscriber#: [id number]    Group#:  Precert#:              History & Physical      Danielle Miguel A DO at 10/26/20 0713                [facility]    PROGRESS NOTE    Name:  Noe Martel   Age:  85 y.o.  Sex:  male  :  1935  MRN:  [id number]   Visit Number:  [id number]  Admission Date:  10/23/2020  Date Of Service:  10/26/20  Primary Care Physician:  Norma Reyes APRN     LOS: 3 days :  Patient Care Team:  Norma Reyes APRN as PCP - General  Provider, No Known as PCP - Family Medicine:    Chief Complaint:      Respiratory failure    Subjective / Interval History:     Patient is seen and evaluated today at bedside.  PPE was worn.  He appears more alert and is conversant.  He has been off of his BiPAP.  He denies any pain or breathing difficulties.       HX:  85 male history of CAD, COPD, HTN, HLD who was brought to the ED by family after they noted that he was progressively weak and aphasic today.    Patient son had recently tested positive for COVID-19. Evaluation upon arrival to the hospital indicated multifocal pneumonia as seen on chest CT. CT of the head showed atrophy with no acute intracranial process.  COVID-19 testing was positive. Patient also had evidence of SANG, renal consulted.  He was admitted to the ICU and initiated on Decadron and antibiotics.    Review of Systems:     A full 10 point review of systems was performed and all pertinent positives and negatives are noted in the HPI.      Vital  Signs:    Temp:  [97.3 °F (36.3 °C)-97.9 °F (36.6 °C)] 97.7 °F (36.5 °C)  Heart Rate:  [] 108  Resp:  [13-21] 18  BP: ()/(49-84) 92/73    Intake and output:    I/O last 3 completed shifts:  In: 4047.1 [P.O.:240; I.V.:3807.1]  Out: 1250 [Urine:1250]  No intake/output data recorded.    Physical Examination: Examined again today  General: No acute distress, resting in bed  HEENT: EOMI, NC/AT, nasal cannula  Heart: Regular on the monitor  Lungs: Nonlabored  Abdomen: Soft, nondistended, nontender  Extremities: No edema, or cyanosis  Neurological: Awake, alert, oriented to person, place, not time, moves extremities  Psychological: Good eye contact  Skin: warm, dry    Laboratory results:    Results from last 7 days   Lab Units 10/26/20  0549 10/25/20  0535 10/24/20  0431 10/23/20  1531   SODIUM mmol/L 146* 144 151* 148*   POTASSIUM mmol/L 4.4 4.1 4.8 5.3*   CHLORIDE mmol/L 114* 112* 116* 109*   CO2 mmol/L 17.8* 21.4* 18.8* 22.8   BUN mg/dL 73* 101* 110* 105*   CREATININE mg/dL 1.67* 2.31* 2.82* 3.08*   CALCIUM mg/dL 7.6* 7.5* 7.7* 8.7   BILIRUBIN mg/dL 0.6  --  0.5 1.0   ALK PHOS U/L 43  --  43 50   ALT (SGPT) U/L 38  --  53* 64*   AST (SGOT) U/L 31  --  59* 62*   GLUCOSE mg/dL 201* 224* 249* 164*     Results from last 7 days   Lab Units 10/26/20  0549 10/25/20  0535 10/24/20  0432   WBC 10*3/mm3 18.53* 18.34* 18.03*   HEMOGLOBIN g/dL 14.9 13.7 15.3   HEMATOCRIT % 45.0 43.8 47.6   PLATELETS 10*3/mm3 151 178 176         Results from last 7 days   Lab Units 10/23/20  1531   TROPONIN T ng/mL 0.027     Results from last 7 days   Lab Units 10/23/20  1720   BLOODCX  No growth at 2 days     Results from last 7 days   Lab Units 10/23/20  1549   PH, ARTERIAL pH units 7.469   PO2 ART mm Hg 157.0*   PCO2, ARTERIAL mm Hg 28.7*   HCO3 ART mmol/L 20.8*       I have reviewed the patient's laboratory results.    Radiology results:    Imaging Results (Last 24 Hours)     ** No results found for the last 24 hours. **          I  "have reviewed the patient's radiology reports.    Medication Review:     I have reviewed the patients active and prn medications.       Acute respiratory failure with hypoxia (CMS/HCC)    Hypertension    CAD (coronary artery disease)    GERD (gastroesophageal reflux disease)    COVID-19    Sepsis, unspecified organism (CMS/HCC)      Assessment:    Sepsis  Acute hypoxemic respiratory failure POA  COVID-19 pneumonia  Superimposed community-acquired pneumonia with risk factors for MDR gram-negative raheem POA  Acute metabolic encephalopathy POA  Acute kidney injury versus CKD  Elevated troponin  Chronic: COPD, CAD s/p PCI, HTN, HLD, GERD    Plan:     -Clinically improving, now off BiPAP, on 4 L nasal cannula, mentation has improved as well; stepped out to telemetry  -Continue IV Rocephin, Levaquin, decrease Decadron to daily dosing  -Since his renal function has improved, may consider remdesivir repeat doses, await nephrology recommendations  -supplemental oxygen as necessary to maintain appropriate saturation >88%  -Speech eval  -A1c 6.2, decrease Levemir  -I have reviewed the meds, labs, imaging, and discussed case with nursing staff at bedside; Lovenox for DVT PPX    Miguel A Santos DO  10/26/20  07:14 EDT    Dictated utilizing Dragon dictation.      Electronically signed by Miguel A Santos DO at 10/26/20 0746     Miguel A Santos DO at 10/23/20 9656              [facility]      Name:  Noe Martel   Age:  85 y.o.  Sex:  male  :  1935  MRN:  [id number]   Visit Number:  [id number]  Admission Date:  10/23/2020  Date Of Service:  10/23/20  Primary Care Physician:  Norma Reyes APRN    Chief Complaint:     \"He was so weak and just would not talk today\"    History Of Presenting Illness:      85 male history of CAD, COPD, HTN, HLD who was brought to the ED by family after they noted that he was progressively weak and aphasic today.  The son has reportedly been sick as " well and diagnosed with Covid recently.  The patient has been feeling bad for the past week and not been walking.  Today, when he was not talking, the family grew concerned and brought into the ER.  He had a SPO2 of 68% on room air and 90% with nonrebreather.  At the time of my evaluation, he is on BiPAP requiring an FiO2 of 70% 20/11 backup rate 16.  The patient is unable to contribute reliable history, as he falls back asleep 1 physically stimulated.    Review Of Systems:     Unable to obtain due to altered mental status     Past Medical History:    Past Medical History:   Diagnosis Date   • BPH (benign prostatic hyperplasia)    • CAD (coronary artery disease)     status post drug-eluting stent deployment in the ostium of the right coronary artery and balloon angioplasty of the obtuse marginal branch of the left circumflex, 07/29/2015.  Ejection fraction 60%.   • Chronic back pain    • COPD (chronic obstructive pulmonary disease) (CMS/[facility])    • Dyslipidemia      untreated.    • GERD (gastroesophageal reflux disease)    • Hyperlipidemia    • Hypertension        Past Surgical history:    Past Surgical History:   Procedure Laterality Date   • CARDIAC SURGERY      STENT PLACEMENT   • CHOLECYSTECTOMY     • HERNIA REPAIR      bilateral       Social History:    Social History     Socioeconomic History   • Marital status:      Spouse name: Not on file   • Number of children: Not on file   • Years of education: Not on file   • Highest education level: Not on file   Occupational History     Employer: RETIRED   Tobacco Use   • Smoking status: Never Smoker   Substance and Sexual Activity   • Alcohol use: No       Family History:    History reviewed. No pertinent family history.    Allergies:      Sulfa antibiotics and Sulfur    Home Medications:    Prior to Admission Medications     Prescriptions Last Dose Informant Patient Reported? Taking?    acetaminophen (TYLENOL) 325 MG tablet   Yes Yes    Take 650 mg by mouth  Every 6 (Six) Hours As Needed for Mild Pain .    albuterol sulfate  (90 Base) MCG/ACT inhaler   Yes Yes    Inhale 2 puffs Every 4 (Four) Hours As Needed for Wheezing.    amLODIPine (NORVASC) 10 MG tablet   Yes Yes    TAKE 1 TABLET BY MOUTH EVERY DAY    aspirin 81 MG chewable tablet   Yes Yes    Take 81 mg by mouth daily.      budesonide-formoterol (SYMBICORT) 160-4.5 MCG/ACT inhaler   Yes Yes    Inhale 2 puffs 2 (Two) Times a Day.    lisinopril (PRINIVIL,ZESTRIL) 40 MG tablet   Yes Yes    Take 40 mg by mouth Daily.    montelukast (SINGULAIR) 10 MG tablet   Yes Yes    Take 10 mg by mouth Every Night.    nitroglycerin (NITROSTAT) 0.4 MG SL tablet   Yes Yes    omeprazole (PriLOSEC) 20 MG capsule   Yes Yes    TAKE 1 CAPSULE BY MOUTH DAILY. REPLACES FAMOTIDINE    terazosin (HYTRIN) 5 MG capsule   Yes Yes    Take 5 mg by mouth Every Night.             ED Medications:    Medications   ipratropium-albuterol (DUO-NEB) nebulizer solution 3 mL (has no administration in time range)   enoxaparin (LOVENOX) syringe 40 mg (has no administration in time range)   dexamethasone (DECADRON) injection 6 mg (has no administration in time range)   remdesivir 200 mg in sodium chloride 0.9 % 250 mL IVPB (powder vial) (has no administration in time range)   Pharmacy Consult - Remdesivir (has no administration in time range)   methylPREDNISolone sodium succinate (SOLU-Medrol) injection 125 mg (125 mg Intravenous Given 10/23/20 6687)   sodium chloride 0.9 % bolus 1,000 mL (0 mL Intravenous Stopped 10/23/20 1759)   AZITHROMYCIN 500 MG/250 ML 0.9% NS IVPB (vial-mate) (500 mg Intravenous New Bag 10/23/20 1715)   cefTRIAXone (ROCEPHIN) IVPB 1 g/50ml dextrose (premix) (0 g Intravenous Stopped 10/23/20 1759)       Vital Signs:    Temp:  [100.1 °F (37.8 °C)] 100.1 °F (37.8 °C)  Heart Rate:  [105-117] 105  Resp:  [30-40] 30  BP: (111-113)/(76-80) 113/76        10/23/20  1512   Weight: 81.6 kg (180 lb)       Body mass index is 26.58  kg/m².    Physical Exam:  General: Lethargic appearing, asleep, arousable, labored breathing  HEENT:  NC/AT, BiPAP  Heart: Tachycardic  Lungs: Labored on FiO2 70% BiPAP  Abdomen: Soft, nondistended, nontender  Extremities: No edema, or cyanosis  Neurological: Arousable to physical stimulation, falls back asleep, unable to discern orientation  Psychological: Unable to assess  Skin: warm, dry    Laboratory data:    I have reviewed the labs done in the emergency room.    Results from last 7 days   Lab Units 10/23/20  1531   SODIUM mmol/L 148*   POTASSIUM mmol/L 5.3*   CHLORIDE mmol/L 109*   CO2 mmol/L 22.8   BUN mg/dL 105*   CREATININE mg/dL 3.08*   CALCIUM mg/dL 8.7   BILIRUBIN mg/dL 1.0   ALK PHOS U/L 50   ALT (SGPT) U/L 64*   AST (SGOT) U/L 62*   GLUCOSE mg/dL 164*     Results from last 7 days   Lab Units 10/23/20  1531   WBC 10*3/mm3 16.28*   HEMOGLOBIN g/dL 17.0   HEMATOCRIT % 52.5*   PLATELETS 10*3/mm3 227         Results from last 7 days   Lab Units 10/23/20  1531   TROPONIN T ng/mL 0.027     Results from last 7 days   Lab Units 10/23/20  1531   PROBNP pg/mL 988.6         Results from last 7 days   Lab Units 10/23/20  1531   LIPASE U/L 18     Results from last 7 days   Lab Units 10/23/20  1549   PH, ARTERIAL pH units 7.469   PO2 ART mm Hg 157.0*   PCO2, ARTERIAL mm Hg 28.7*   HCO3 ART mmol/L 20.8*     Results from last 7 days   Lab Units 10/23/20  1543   COLOR UA  Dark Yellow*   GLUCOSE UA  Negative   KETONES UA  Negative   LEUKOCYTES UA  Negative   PH, URINE  <=5.0   BILIRUBIN UA  Small (1+)*   UROBILINOGEN UA  1.0 E.U./dL     Pain Management Panel     There is no flowsheet data to display.              EKG:      Reviewed by myself reveals sinus tachycardia, rate 121, QTc 346, no acute ST segment or ischemic changes    Radiology:    Imaging Results (Last 72 Hours)     Procedure Component Value Units Date/Time    CT Chest Without Contrast [[id number]] Collected: 10/23/20 1752     Updated: 10/23/20 1753     Narrative:      FINAL REPORT    TECHNIQUE:  Axial imaging of the chest was obtained without contrast.  Reformatted images were also obtained and reviewed.This study  was performed with techniques to keep radiation doses as low as  reasonably achievable, (ALARA). Individualized dose reduction  technique using automated exposure control or adjustment of mA  and/or kV according to the patient's size were employed.    CLINICAL HISTORY:  + covid    FINDINGS:  There is no axillary adenopathy. There is no hilar or  mediastinal mass or adenopathy. Heart size is normal. There is  no pericardial or pleural effusion. Limited images of the upper  abdomen are unremarkable.  There is left lower lobe  consolidation.  Patchy ground glass opacities are seen within  the upper lobes.      Impression:      Multifocal pneumonia which may be viral or bacterial    Authenticated by REBECCA West MD on 10/23/2020 05:52:27 PM    CT Head Without Contrast [[id number]] Collected: 10/23/20 1743     Updated: 10/23/20 1744    Narrative:      FINAL REPORT    TECHNIQUE:  Axial images were performed through the brain.This study was  performed with techniques to keep radiation doses as low as  reasonably achievable, (ALARA). Individualized dose reduction  techniques using automated exposure control or adjustment of mA  and/or kV according to the patient''s size were employed.    CLINICAL HISTORY:  aphasia, difficulty walking    FINDINGS:  There is advanced global atrophy and chronic microvascular  ischemic change.   The ventricles are normal in size for the  degree of atrophy.  There is no extra-axial fluid or midline  shift.  There is no evidence of acute hemorrhage or mass.      Impression:      Atrophy.  No acute intracranial process.    Authenticated by REBECCA West MD on 10/23/2020 05:43:38 PM    XR Chest 1 View [[id number]] Resulted: 10/23/20 1706     Updated: 10/23/20 1706            Acute respiratory failure with hypoxia (CMS/HCC)     Hypertension    CAD (coronary artery disease)    GERD (gastroesophageal reflux disease)    COVID-19    Sepsis, unspecified organism (CMS/HCC)    I personally reviewed the CT chest and CXR from the ED which revealed bilateral infiltrates consistent with pneumonia  I personally discussed the case with the ED physician    Assessment:  Sepsis  Community-acquired pneumonia with risk factors for MDR gram-negative raheem POA  COVID-19 pneumonia  Acute hypoxemic respiratory failure  Acute metabolic encephalopathy  Acute kidney injury versus CKD  Elevated troponin  Chronic: COPD, CAD s/p PCI, HTN, HLD, GERD    Plan:    -I do not believe he is a candidate for the 30 cc/kg bolus given his severe bilateral infiltrates and risk of developing ARDS  -Cultures have been obtained  -Given he is being admitted to the ICU, start IV Rocephin and Levaquin, Decadron, Lovenox  as an antithrombotic  -Tough call with remdesivir given his CKD, but will try a one-time dose for now and monitor his renal function closely  -Consult nephrology  -He is very high risk for further clinical deterioration and decline; prognosis is guarded.  Family initially was indicating that he would like to be DNR, and change her mind to full code for now      Miguel A Santos DO  10/23/20  19:08 EDT    Dictated utilizing Dragon dictation.      Electronically signed by Miguel A Santos DO at 10/23/20 1908         Current Facility-Administered Medications   Medication Dose Route Frequency Provider Last Rate Last Dose   • albuterol sulfate HFA (PROVENTIL HFA;VENTOLIN HFA;PROAIR HFA) inhaler 2 puff  2 puff Inhalation Q4H PRN Miguel A Santos DO       • aspirin chewable tablet 81 mg  81 mg Oral Daily Miguel A Santos DO   81 mg at 11/05/20 0806   • budesonide-formoterol (SYMBICORT) 160-4.5 MCG/ACT inhaler 2 puff  2 puff Inhalation BID - RT Miguel A Santos DO   2 puff at 11/05/20 0807   • dextrose (D50W) 25 g/ 50mL Intravenous Solution 25 g  25 g Intravenous Q15 Min PRN Miguel A Santos DO        • dextrose (GLUTOSE) oral gel 1 tube  1 tube Oral Q15 Min PRN Miguel A Santos, DO       • dextrose 5 % and sodium chloride 0.9 % infusion  100 mL/hr Intravenous Continuous Sushil Shane  mL/hr at 11/05/20 0806 100 mL/hr at 11/05/20 0806   • glucagon (human recombinant) (GLUCAGEN DIAGNOSTIC) injection 1 mg  1 mg Subcutaneous PRN Danielle, Jasenar, DO       • insulin regular (humuLIN R,novoLIN R) injection 0-7 Units  0-7 Units Subcutaneous Q6H DanielleMiguel A, DO   2 Units at 11/04/20 1700   • metoprolol tartrate (LOPRESSOR) injection 2.5 mg  2.5 mg Intravenous Q6H PRN Miguel A Santos, DO       • multivitamin with minerals 1 tablet  1 tablet Oral Daily Sushil Shane MD       • norepinephrine (LEVOPHED) 8 mg in 250 mL NS infusion (premix)  0.02-0.3 mcg/kg/min Intravenous Titrated Miguel A Santos, DO   Stopped at 11/05/20 1235   • nystatin (MYCOSTATIN) 542869 UNIT/ML suspension 500,000 Units  5 mL Oral 4x Daily DanielleMiguel A olivares, DO   500,000 Units at 11/05/20 1118   • pantoprazole (PROTONIX) injection 40 mg  40 mg Intravenous Q12H Sushil Shane MD   40 mg at 11/05/20 0806   • piperacillin-tazobactam (ZOSYN) 3.375 g in iso-osmotic dextrose 50 ml (premix)  3.375 g Intravenous Q8H Miguel A Santos, DO   3.375 g at 11/05/20 0806   • PRO-STAT oral liquid 30 mL  30 mL Oral BID Miguel A Santos, DO   30 mL at 11/05/20 0807   • sodium chloride 0.9 % flush 10 mL  10 mL Intravenous Q12H Miguel A Satnos, DO   10 mL at 11/05/20 0807   • sodium chloride 0.9 % flush 10 mL  10 mL Intravenous PRN Miguel A Santos, DO   10 mL at 10/26/20 0856        Respiratory Therapy (last 24 hours)      Respiratory Therapy Flowsheet     Row Name 11/05/20 1315 11/05/20 1215 11/05/20 1205 11/05/20 1200 11/05/20 1158       Oxygen Therapy    SpO2  91 %  99 %  --  98 %  98 %    Pulse Oximetry Type  --  --  --  Continuous  --    Device (Oxygen Therapy)  --  --  room air  room air  --       Vital Signs    Pulse  92  --  --  109  110    Heart Rate Source  --  --  --  Monitor  --     Resp  --  --  --  26  --    Resp Rate Source  --  --  --  Visual  --    BP  (!) 88/46  146/87  --  137/73  --    Noninvasive MAP (mmHg)  59  104  --  95  --    BP Location  --  --  --  Right arm  --    BP Method  --  --  --  Automatic  --    Patient Position  --  --  --  Lying  --       Assessment    Respiratory WDL  --  --  WDL except;breath sounds;cough;mucous membranes  --  --    Rhythm/Pattern, Respiratory  --  --  unlabored;pattern regular;depth regular;no shortness of breath reported  --  --    Nailbeds  --  --  no discoloration  --  --    Mucous Membranes  --  --  moist;intact;pink  --  --    Cough Frequency  --  --  infrequent  --  --    Cough Type  --  --  dry;nonproductive  --  --       Breath Sounds    Breath Sounds  --  --  All Fields  --  --    All Lung Fields Breath Sounds  --  --  diminished  --  --       Treatment/Therapy    Cough And Deep Breathing  --  --  done with encouragement  --  --       Care Plan Interventions    Airway/Ventilation Management  --  --  airway patency maintained;pulmonary hygiene promoted  --  --    Supportive Measures  --  --  active listening utilized;verbalization of feelings encouraged;relaxation techniques promoted  --  --    Row Name 11/05/20 1145 11/05/20 1116 11/05/20 1115 11/05/20 1103 11/05/20 1100       Oxygen Therapy    SpO2  97 %  --  98 %  95 %  98 %       Vital Signs    Temp  --  --  --  --  97.7 °F (36.5 °C)    Temp src  --  --  --  --  Infrared    Pulse  117  112  --  (!) 129  --    BP  130/78  --  135/71  --  140/89    Noninvasive MAP (mmHg)  100  --  75  --  95    Row Name 11/05/20 1045 11/05/20 1044 11/05/20 1030 11/05/20 1015 11/05/20 1014       Oxygen Therapy    SpO2  --  96 %  93 %  --  95 %       Vital Signs    Pulse  --  90  96  --  --    BP  121/70  --  109/64  125/72  --    Noninvasive MAP (mmHg)  87  --  80  95  --    Row Name 11/05/20 1000 11/05/20 0945 11/05/20 0944 11/05/20 0930 11/05/20 0915       Oxygen Therapy    SpO2  98 %  --  92 %  91 %  91  %       Vital Signs    Pulse  --  --  88  88  90    BP  132/80  106/53  --  99/55  100/51    Noninvasive MAP (mmHg)  84  72  --  83  72    Row Name 11/05/20 0900 11/05/20 0826 11/05/20 0818 11/05/20 0816 11/05/20 0800       Oxygen Therapy    SpO2  91 %  97 %  97 %  --  96 %    Device (Oxygen Therapy)  --  --  room air  --  room air       Vital Signs    Temp  --  --  --  --  97.9 °F (36.6 °C)    Temp src  --  --  --  --  Infrared    Pulse  96  --  --  --  108    Heart Rate Source  --  --  --  --  Monitor    Resp  --  --  --  --  24    Resp Rate Source  --  --  --  --  Visual    BP  98/48  149/87  138/100  (!) 146/115  136/84    Noninvasive MAP (mmHg)  69  111  120  128  116    BP Location  --  --  --  --  Left arm    BP Method  --  --  --  --  Automatic    Patient Position  --  --  --  --  Lying       Assessment    Respiratory WDL  --  --  WDL except;breath sounds;cough;mucous membranes  --  --    Rhythm/Pattern, Respiratory  --  --  unlabored;pattern regular;depth regular;no shortness of breath reported  --  --    Nailbeds  --  --  no discoloration  --  --    Mucous Membranes  --  --  moist;intact;pink  --  --    Cough Frequency  --  --  infrequent  --  --    Cough Type  --  --  dry;nonproductive  --  --       Breath Sounds    Breath Sounds  --  --  All Fields  --  --    All Lung Fields Breath Sounds  --  --  diminished  --  --       Treatment/Therapy    Administration (IS)  --  --  unable to perform  --  --    Cough And Deep Breathing  --  --  done independently per patient  --  --       Care Plan Interventions    Airway/Ventilation Management  --  --  airway patency maintained;pulmonary hygiene promoted  --  --    Supportive Measures  --  --  verbalization of feelings encouraged  --  --    Device Skin Pressure Protection  --  --  absorbent pad utilized/changed;skin-to-device areas padded;skin-to-skin areas padded  --  --    Row Name 11/05/20 0730 11/05/20 0700 11/05/20 0630 11/05/20 0629 11/05/20 0530       Oxygen  Therapy    SpO2  --  --  98 %  99 %  --       Vital Signs    Temp  --  97.7 °F (36.5 °C)  --  --  --    Temp src  --  Infrared  --  --  --    Pulse  101  108  --  116  --    BP  118/66  138/90  121/88  --  101/57    Noninvasive MAP (mmHg)  81  112  95  --  75    Row Name 11/05/20 0527 11/05/20 0515 11/05/20 0500 11/05/20 0456 11/05/20 0452       Oxygen Therapy    SpO2  99 %  100 %  95 %  94 %  98 %       Vital Signs    Pulse  114  --  --  114  105    BP  --  125/95  111/61  --  --    Noninvasive MAP (mmHg)  --  96  73  --  --    Row Name 11/05/20 0445 11/05/20 0430 11/05/20 0427 11/05/20 0418 11/05/20 0400       Oxygen Therapy    SpO2  --  --  95 %  --  --    Pulse Oximetry Type  --  --  --  --  Continuous    Device (Oxygen Therapy)  --  --  --  --  room air       Vital Signs    Temp  --  --  --  --  97.7 °F (36.5 °C)    Temp src  --  --  --  --  Infrared    Pulse  --  --  106  --  --    Resp  --  --  --  --  20    Resp Rate Source  --  --  --  --  Visual    BP  125/68  117/62  --  129/87  --    Noninvasive MAP (mmHg)  79  69  --  93  --    BP Location  --  --  --  --  Left arm    BP Method  --  --  --  --  Automatic    Patient Position  --  --  --  --  Lying       Assessment    Respiratory WDL  --  --  --  --  WDL except;breath sounds;cough;mucous membranes    Nailbeds  --  --  --  --  no discoloration    Mucous Membranes  --  --  --  --  dry    Cough Frequency  --  --  --  --  infrequent    Cough Type  --  --  --  --  dry;fair;nonproductive       Breath Sounds    Breath Sounds  --  --  --  --  All Fields    All Lung Fields Breath Sounds  --  --  --  --  diminished       Treatment/Therapy    Cough And Deep Breathing  --  --  --  --  done independently per patient       Care Plan Interventions    Airway/Ventilation Management  --  --  --  --  airway patency maintained;pulmonary hygiene promoted    Supportive Measures  --  --  --  --  positive reinforcement provided    Device Skin Pressure Protection  --  --  --  --   absorbent pad utilized/changed;adhesive use limited;skin-to-device areas padded;skin-to-skin areas padded    Row Name 11/05/20 0356 11/05/20 0330 11/05/20 0329 11/05/20 0315 11/05/20 0300       Oxygen Therapy    SpO2  --  --  93 %  95 %  --       Vital Signs    Pulse  111  --  105  --  102    BP  138/73  128/81  --  146/94  146/82    Noninvasive MAP (mmHg)  100  105  --  101  102    Row Name 11/05/20 0245 11/05/20 0230 11/05/20 0215 11/05/20 0200 11/05/20 0159       Oxygen Therapy    SpO2  96 %  --  93 %  --  97 %       Vital Signs    Pulse  93  --  --  --  103    BP  134/66  134/78  117/89  137/96  --    Noninvasive MAP (mmHg)  83  96  104  121  --    Row Name 11/05/20 0145 11/05/20 0130 11/05/20 0129 11/05/20 0115 11/05/20 0100       Oxygen Therapy    SpO2  93 %  --  94 %  94 %  --       Vital Signs    Pulse  91  --  89  86  92    BP  131/69  128/66  --  124/66  116/67    Noninvasive MAP (mmHg)  93  86  --  86  81    Row Name 11/05/20 0045 11/05/20 0032 11/05/20 0031 11/05/20 0030 11/05/20 0017       Oxygen Therapy    SpO2  93 %  95 %  --  --  100 %       Vital Signs    Pulse  104  --  102  --  105    BP  128/82  --  --  (!) 136/106  --    Noninvasive MAP (mmHg)  88  --  --  114  --    Row Name 11/05/20 0015 11/05/20 0000 11/04/20 2359 11/04/20 2345 11/04/20 2332       Oxygen Therapy    SpO2  --  --  97 %  93 %  94 %    Pulse Oximetry Type  --  Continuous  --  --  --    Device (Oxygen Therapy)  --  room air  --  --  --       Vital Signs    Temp  --  97.7 °F (36.5 °C)  --  --  --    Temp src  --  Infrared  --  --  --    Pulse  --  93  90  93  93    Resp  --  20  --  --  --    Resp Rate Source  --  Visual  --  --  --    BP  116/82  130/83  --  115/66  --    Noninvasive MAP (mmHg)  92  99  --  80  --    BP Location  --  Left arm  --  --  --    BP Method  --  Automatic  --  --  --    Patient Position  --  Lying  --  --  --       Assessment    Respiratory WDL  --  WDL except;breath sounds;cough;mucous membranes  --  --   --    Nailbeds  --  no discoloration  --  --  --    Mucous Membranes  --  dry  --  --  --    Cough Frequency  --  infrequent  --  --  --    Cough Type  --  dry;fair;nonproductive  --  --  --       Breath Sounds    Breath Sounds  --  All Fields  --  --  --    All Lung Fields Breath Sounds  --  diminished  --  --  --       Treatment/Therapy    Cough And Deep Breathing  --  done independently per patient  --  --  --       Care Plan Interventions    Airway/Ventilation Management  --  airway patency maintained;pulmonary hygiene promoted  --  --  --    Supportive Measures  --  positive reinforcement provided;verbalization of feelings encouraged  --  --  --    Device Skin Pressure Protection  --  absorbent pad utilized/changed;adhesive use limited;skin-to-device areas padded;skin-to-skin areas padded  --  --  --    Row Name 11/04/20 2330 11/04/20 2316 11/04/20 2315 11/04/20 2302 11/04/20 2300       Oxygen Therapy    SpO2  --  97 %  --  96 %  --       Vital Signs    Pulse  98  105  --  109  --    BP  114/68  --  132/84  --  125/80    Noninvasive MAP (mmHg)  79  --  93  --  99    Row Name 11/04/20 2245 11/04/20 2230 11/04/20 2215 11/04/20 2145 11/04/20 2130       Oxygen Therapy    SpO2  --  --  97 %  --  --       Vital Signs    Pulse  --  112  --  98  --    BP  106/70  (!) 134/102  131/82  128/80  123/71    Noninvasive MAP (mmHg)  80  116  95  100  79    Row Name 11/04/20 2115 11/04/20 2100 11/04/20 2030 11/04/20 2000 11/04/20 1945       Oxygen Therapy    SpO2  96 %  --  --  91 %  --    Device (Oxygen Therapy)  --  --  --  room air  --       Vital Signs    Pulse  --  --  101  107  --    BP  124/71  134/60  126/73  119/98  114/55    Noninvasive MAP (mmHg)  92  68  81  99  77       Assessment    Respiratory WDL  --  --  --  WDL except;breath sounds;cough;mucous membranes  --    Nailbeds  --  --  --  no discoloration  --    Mucous Membranes  --  --  --  dry  --    Cough Frequency  --  --  --  infrequent  --    Cough Type  --   --  --  dry;fair;nonproductive  --       Breath Sounds    Breath Sounds  --  --  --  All Fields  --    All Lung Fields Breath Sounds  --  --  --  diminished  --       Treatment/Therapy    Cough And Deep Breathing  --  --  --  done independently per patient  --       Rotation Therapy     Therapy Type  --  --  --  rotation therapy  --       Care Plan Interventions    Airway/Ventilation Management  --  --  --  airway patency maintained;pulmonary hygiene promoted  --    Device Skin Pressure Protection  --  --  --  absorbent pad utilized/changed;adhesive use limited;skin-to-device areas padded;skin-to-skin areas padded  --    Row Name 11/04/20 1944 11/04/20 1930 11/04/20 1915 11/04/20 1914 11/04/20 1900       Oxygen Therapy    SpO2  95 %  96 %  --  95 %  --       Vital Signs    Pulse  96  --  99  100  105    BP  --  115/69  104/71  --  112/62    Noninvasive MAP (mmHg)  --  96  86  --  76    Row Name 11/04/20 1859 11/04/20 1800 11/04/20 1745 11/04/20 1730 11/04/20 1715       Oxygen Therapy    SpO2  97 %  98 %  96 %  94 %  96 %       Vital Signs    Pulse  101  105  103  104  105    BP  --  126/68  98/64  109/72  109/66    Noninvasive MAP (mmHg)  --  91  78  89  81       Rotation Therapy     Therapy Type  --  rotation therapy  --  --  --    Right Pause Time  --  10  --  --  --    Left Pause Time  --  10  --  --  --    Supine Time  --  0  --  --  --    Row Name 11/04/20 1700 11/04/20 1645 11/04/20 1630 11/04/20 1615 11/04/20 1600       Oxygen Therapy    SpO2  93 %  94 %  96 %  93 %  94 %    Pulse Oximetry Type  --  --  --  --  Continuous    Device (Oxygen Therapy)  --  --  --  --  room air       Vital Signs    Pulse  102  103  118  102  111    Heart Rate Source  --  --  --  --  Monitor    Resp  --  --  --  --  16    Resp Rate Source  --  --  --  --  Visual    BP  133/72  125/70  117/73  135/70  136/70    Noninvasive MAP (mmHg)  89  94  89  95  79    BP Location  --  --  --  --  Left arm    BP Method  --  --  --  --   Automatic    Patient Position  --  --  --  --  Lying    Row Name 20 1545 20 1544 20 1542 20 1530          Oxygen Therapy    SpO2  --  98 %  --  --     Device (Oxygen Therapy)  --  --  room air  --        Vital Signs    Temp  --  97.9 °F (36.6 °C)  --  --     Temp src  --  Infrared  --  --     Pulse  --  100  --  105     BP  118/62  --  --  108/57     Noninvasive MAP (mmHg)  78  --  --  73        Assessment    Respiratory WDL  --  --  WDL except;breath sounds;cough;mucous membranes  --     Mucous Membranes  --  --  dry  --     Cough Frequency  --  --  infrequent  --     Cough Type  --  --  dry;fair;nonproductive  --        Breath Sounds    Breath Sounds  --  --  All Fields  --     All Lung Fields Breath Sounds  --  --  diminished  --        Treatment/Therapy    Cough And Deep Breathing  --  --  done independently per patient  --        Rotation Therapy     Therapy Type  --  --  rotation therapy  --     Right Pause Time  --  --  10  --     Left Pause Time  --  --  10  --     Supine Time  --  --  0  --        Care Plan Interventions    Device Skin Pressure Protection  --  --  absorbent pad utilized/changed;adhesive use limited;positioning supports utilized;skin-to-device areas padded  --            Physician Progress Notes (last 24 hours) (Notes from 20 1517 through 20 1517)      Sushil Shane MD at 20 1429                [facility]    PROGRESS NOTE    Name:  Noe Martel   Age:  85 y.o.  Sex:  male  :  1935  MRN:  [id number]   Visit Number:  [id number]  Admission Date:  10/23/2020  Date Of Service:  20  Primary Care Physician:  Devika Foy APRN     LOS: 13 days :  Patient Care Team:  Devika Foy APRN as PCP - General (Family Medicine):    Chief Complaint:      Follow-up of generalized weakness, hypotension and hypothermia.    Subjective:     Mr. Martel was seen and examined this morning.  He seems to be a new person this morning  and is more alert and answering questions and talking.  He is on low dose Levophed drip this morning.  He was able to eat a few bites of his breakfast.  He does seem to be fidgety and agitated at times trying to get out of bed.  He was able to answer a few questions but does get confused at times.  He has remained stable with regards to his hemoglobin after he received 2 units of packed red blood cells.  No further episodes of melena.  His hypothermia has resolved.  No significant overnight events.    Hospital course:    Disease an 85-year-old male with history of coronary artery disease, COPD, hypertension was brought to the emergency room by his family on 10/23/2020 with progressive weakness and decreased word output.  Patient's son reportedly has been sick and was diagnosed with Covid recently.  Patient has been feeling weak and has not been walking for a few days.  He was noted to have hypoxia in the emergency room at 68% and was placed on nonrebreather mask followed by BiPAP therapy.  His COVID-19 test was positive.  Patient was admitted to the ICU and was placed on IV antibiotics therapy with Rocephin and Levaquin as well as IV dexamethasone.  He was started on remdesivir.  Due to his renal failure, nephrology was consulted.  Patient was placed on IV fluids for dehydration and renal failure.  His initial CT of the head was negative for acute intracranial abnormalities but showed chronic ischemic microvascular disease.  Patient has not been mostly non-verbal throughout the hospital stay but was able to eat at least 50% of his meals later in the hospital course.    Once the renal function improved, he did complete the course of remdesivir.  He also completed a course of Rocephin and Levaquin.  He was transferred out of ICU to the Covid floor.  He was started on physical and Occupational Therapy due to significant generalized weakness.  Case management was consulted for possible rehab placement but the family  refused rehabilitation.  Patient was planned for discharge on 11/3/2020 with home health services but could not be discharged that day due to inability to arrange home health.  Patient continued to develop hypotension the next day and was noted to have worsening renal failure.  He was also noted to have dark-colored stools and diarrhea.  He had to be transferred back to the ICU and was suspected of septic shock possibly secondary to urinary tract infection.  He was also thought to have GI bleeding and was transfused with 2 units of packed red blood cells.  He was placed on IV Protonix.  Dr. Hernandez was consulted from surgical services.  Patient had to be placed on Levophed drip to maintain blood pressures and a right femoral central venous line was placed.  He was placed on broad-spectrum IV antibiotics therapy with Zosyn and vancomycin.  Patient improved over the next 48 hours with regards to his hypotension.  He was able to verbalize and became more alert with antibiotics therapy and Levophed drip.  His vancomycin therapy was subsequently discontinued.  He was started on puréed diet with nectar thick liquids as per speech recommendations.  He was started on physical and Occupational Therapy.    Review of Systems:     Does not seem to be in any distress.  Unable to obtain review of systems as he is confused.    Vital Signs:    Temp:  [97.7 °F (36.5 °C)-97.9 °F (36.6 °C)] 97.7 °F (36.5 °C)  Heart Rate:  [] 92  Resp:  [16-26] 26  BP: ()/() 88/46    Intake and output:    I/O last 3 completed shifts:  In: 7283 [I.V.:6520; Blood:713; IV Piggyback:50]  Out: 1715 [Urine:1715]  I/O this shift:  In: 120 [P.O.:120]  Out: -     Physical Examination:    General Appearance:  Alert and oriented to person, does not seem to be in any acute distress.  Does get fidgety occasionally.   Head:  Atraumatic and normocephalic, without obvious abnormality.   Eyes:          PERRLA, conjunctivae and sclerae normal, no  Icterus. No pallor.   Neck: Supple, trachea midline, no thyromegaly, no carotid bruit.   Lungs:   Chest shape is normal. Breath sounds heard bilaterally equally.  No wheezing.  Occasional basal crackles heard.  No pleural rub or bronchial breathing.   Heart:  Normal S1 and S2, no murmur, no gallop, no rub. No JVD   Abdomen:   Normal bowel sounds, no masses, no organomegaly. Soft, nontender, nondistended, no guarding, no rebound.  Condom catheter is in place.  Tenderness. Ecchymotic patches noted on the abdominal wall.   Extremities: Moves all extremities, no edema, no cyanosis, no clubbing.  Right femoral central venous line noted.   Skin: No bleeding. Skin excoriation noted in the right hip area.  No erythema.   Neurologic: Alert on but confused at times.  Answers a few questions appropriately. Moves all 4 extremities but does have generalized weakness.     Laboratory results:    Results from last 7 days   Lab Units 11/05/20 0452 11/04/20 0033 11/03/20  0628  10/31/20  0745   SODIUM mmol/L 153* 148* 145   < > 138   POTASSIUM mmol/L 3.8 4.6 4.6   < > 4.3   CHLORIDE mmol/L 127* 116* 113*   < > 106   CO2 mmol/L 17.0* 18.0* 20.8*   < > 23.2   BUN mg/dL 75* 115* 97*   < > 24*   CREATININE mg/dL 2.04* 2.42* 1.48*   < > 1.06   CALCIUM mg/dL 6.7* 6.8* 7.5*   < > 7.4*   BILIRUBIN mg/dL 0.6 0.4  --   --  0.7   ALK PHOS U/L 58 42  --   --  46   ALT (SGPT) U/L 19 20  --   --  36   AST (SGOT) U/L 16 18  --   --  33   GLUCOSE mg/dL 139* 150* 248*   < > 94    < > = values in this interval not displayed.     Results from last 7 days   Lab Units 11/05/20 0452 11/04/20  1202 11/04/20 0033 11/03/20  0627   WBC 10*3/mm3 27.32*  --  36.85* 21.89*   HEMOGLOBIN g/dL 8.9* 10.4* 8.0* 10.1*   HEMATOCRIT % 28.2* 31.1* 24.8* 31.1*   PLATELETS 10*3/mm3 188  --  208 221         Results from last 7 days   Lab Units 11/05/20  0452 11/04/20  0033   CK TOTAL U/L  --  245*   TROPONIN T ng/mL 0.038*  --      Results from last 7 days   Lab Units  11/04/20  0830 11/04/20  0823   BLOODCX  No growth at 24 hours No growth at 24 hours     Results from last 7 days   Lab Units 11/04/20  0337   PH, ARTERIAL pH units 7.463   PO2 ART mm Hg 61.8*   PCO2, ARTERIAL mm Hg 22.1*   HCO3 ART mmol/L 15.8*     I have reviewed the patient's laboratory results.    Radiology results:    Imaging Results (Last 24 Hours)     ** No results found for the last 24 hours. **        Medication Review:     I have reviewed the patient's active and prn medications.       Acute respiratory failure with hypoxia (CMS/HCC)    Hypertension    CAD (coronary artery disease)    GERD (gastroesophageal reflux disease)    COVID-19    Sepsis, unspecified organism (CMS/[facility])    Assessment:    1.  Acute hypoxic respiratory failure secondary to #2, present on admission.  2.  Patchy bilateral COVID-19 pneumonia, present on admission, improved.  3.  Acute renal failure, present on admission, not related to sepsis, resolved.  4.  Sepsis secondary to #2, present on admission, resolved.  5.  Septic shock developed on 11/3/2020, not present on admission.  6.  Suspected bilateral pyelonephritis.  7.  Acute blood loss anemia likely secondary to upper GI bleeding, status post 2 units PRBC.  8.  Essential hypertension.  9.  Coronary artery disease.  10.  Gastroesophageal reflux disease.  11.  Suspected underlying dementia.    Plan:    Mr. Martel seems to have improved significantly compared to yesterday especially with regards to his cognitive abilities.  He seems to be more alert today.  We will start him on oral diet.  We will continue IV antibiotics therapy with Zosyn for suspected urinary tract infection.  Nasal swab for MRSA was negative and I will discontinue vancomycin.  Blood cultures have been negative so far.  Hopefully we will also taper off his Levophed drip today.  He will be continued on IV fluids.    Patient will be started on physical and Occupational Therapy.  He likely has underlying mild cognitive  dysfunction and may be developing dementia.  Once he is off his Levophed drip, he may be able to move out of the ICU.  Discussed with nursing staff and multidisciplinary team.    Sushil Shane MD  20  14:29 EST    Dictated utilizing Dragon dictation.      Electronically signed by Sushil Shane MD at 20 1435       Consult Notes (last 24 hours) (Notes from 20 1517 through 20 151)    No notes of this type exist for this encounter.            Physical Therapy Notes (last 24 hours) (Notes from 20 1517 through 20 151)      Jeni Gracia, PT at 20 1503  Version 1 of 1         Problem: Adult Inpatient Plan of Care  Goal: Plan of Care Review  Recent Flowsheet Documentation  Taken 2020 1317 by Jeni Gracia PT  Progress: no change  Plan of Care Reviewed With: patient  Outcome Summary: PT re-eval completed since patient was transferred to ICU for higher level of care. Patient continues to experience confusion and presents with deficits in strength, balance, endurance and independence. He is expected to benefit from continued skilled PT intervention to improve his mobility status prior to D/C.       Electronically signed by Jeni Gracia PT at 20 1503     Jeni Gracia PT at 20 1504  Version 1 of 1         Patient Name: Noe Martel  : 1935    MRN: [id number]                              Today's Date: 2020       Admit Date: 10/23/2020    Visit Dx:     ICD-10-CM ICD-9-CM   1. COVID-19  U07.1 079.89   2. Pneumonia of both lungs due to infectious organism, unspecified part of lung  J18.9 483.8   3. Acute on chronic respiratory failure with hypoxia (CMS/HCC)  J96.21 518.84     799.02   4. Acute renal failure, unspecified acute renal failure type (CMS/HCC)  N17.9 584.9   5. Transaminitis  R74.01 790.4   6. Acute respiratory failure with hypoxia (CMS/HCC)  J96.01 518.81   7. Chronic obstructive pulmonary disease, unspecified COPD type (CMS/HCC)  J44.9 496      Patient Active Problem List   Diagnosis   • Benign prostatic hyperplasia   • Hypertension   • CAD (coronary artery disease)   • Dyslipidemia   • Chronic back pain   • GERD (gastroesophageal reflux disease)   • COPD (chronic obstructive pulmonary disease) (CMS/[facility])   • COVID-19   • Acute respiratory failure with hypoxia (CMS/[facility])   • Sepsis, unspecified organism (CMS/[facility])     Past Medical History:   Diagnosis Date   • BPH (benign prostatic hyperplasia)    • CAD (coronary artery disease)     status post drug-eluting stent deployment in the ostium of the right coronary artery and balloon angioplasty of the obtuse marginal branch of the left circumflex, 07/29/2015.  Ejection fraction 60%.   • Chronic back pain    • COPD (chronic obstructive pulmonary disease) (CMS/[facility])    • Dyslipidemia      untreated.    • GERD (gastroesophageal reflux disease)    • Hyperlipidemia    • Hypertension    • Impaired functional mobility, balance, gait, and endurance      Past Surgical History:   Procedure Laterality Date   • CARDIAC SURGERY      STENT PLACEMENT   • CHOLECYSTECTOMY     • HERNIA REPAIR      bilateral     General Information     Row Name 11/05/20 1317          Physical Therapy Time and Intention    Document Type  re-evaluation  -LM     Mode of Treatment  physical therapy  -LM     Row Name 11/05/20 1317          General Information    Patient Profile Reviewed  yes  -LM     Prior Level of Function  independent:;all household mobility  -LM     Existing Precautions/Restrictions  fall  -LM     Barriers to Rehab  medically complex;previous functional deficit;cognitive status;hearing deficit  -LM     Row Name 11/05/20 1317          Living Environment    Lives With  child(vasyl), adult;spouse  -LM     Row Name 11/05/20 1317          Cognition    Orientation Status (Cognition)  oriented to;person  -LM     Row Name 11/05/20 1317          Safety Issues, Functional Mobility    Safety Issues Affecting Function (Mobility)  ability to  follow commands;awareness of need for assistance;at risk behavior observed;impulsivity;insight into deficits/self-awareness;judgment;safety precaution awareness;safety precautions follow-through/compliance  -LM     Impairments Affecting Function (Mobility)  balance;cognition;endurance/activity tolerance;shortness of breath;strength;postural/trunk control  -LM     Cognitive Impairments, Mobility Safety/Performance  awareness, need for assistance;impulsivity;insight into deficits/self-awareness;safety precaution awareness;safety precaution follow-through  -LM       User Key  (r) = Recorded By, (t) = Taken By, (c) = Cosigned By    Initials Name Provider Type    Jeni Ventuar, EDDIE Physical Therapist        Mobility     Row Name 11/05/20 1317          Bed Mobility    Bed Mobility  supine-sit;sit-supine  -LM     Supine-Sit [place] (Bed Mobility)  maximum assist (25% patient effort)  -LM     Sit-Supine [place] (Bed Mobility)  maximum assist (25% patient effort)  -LM     Assistive Device (Bed Mobility)  bed rails;head of bed elevated  -LM     Comment (Bed Mobility)  Pt sat EOB x 5 minutes with support varying from max to CGA. Fatigues quickly.  -LM     Row Name 11/05/20 1317          Transfers    Comment (Transfers)  Unable to perform sit to stand or OOB activities.  -LM       User Key  (r) = Recorded By, (t) = Taken By, (c) = Cosigned By    Initials Name Provider Type    Jeni Ventura, EDDIE Physical Therapist        Obj/Interventions     Row Name 11/05/20 1317          Range of Motion Comprehensive    General Range of Motion  bilateral upper extremity ROM WFL;bilateral lower extremity ROM WFL  -LM     Comment, General Range of Motion  AAROM  -LM     Row Name 11/05/20 1317          Strength Comprehensive (MMT)    General Manual Muscle Testing (MMT) Assessment  upper extremity strength deficits identified;lower extremity strength deficits identified  -LM     Comment, General Manual Muscle Testing (MMT)  Assessment  Grossly 3+/5  -LM     Row Name 11/05/20 1317          Balance    Static Sitting Balance  moderate impairment;supported;sitting, edge of bed  -LM     Dynamic Sitting Balance  moderate impairment;supported;sitting, edge of bed  -LM       User Key  (r) = Recorded By, (t) = Taken By, (c) = Cosigned By    Initials Name Provider Type    LM Jeni Gracia, PT Physical Therapist        Goals/Plan     Row Name 11/05/20 1317          Bed Mobility Goal 1 (PT)    Activity/Assistive Device (Bed Mobility Goal 1, PT)  bed rails;sit to supine;supine to sit  -LM     [place] Level/Cues Needed (Bed Mobility Goal 1, PT)  minimum assist (75% or more patient effort)  -LM     Time Frame (Bed Mobility Goal 1, PT)  short term goal (STG);10 days  -LM     Progress/Outcomes (Bed Mobility Goal 1, PT)  goal ongoing  -LM     Row Name 11/05/20 1317          Transfer Goal 1 (PT)    Activity/Assistive Device (Transfer Goal 1, PT)  sit-to-stand/stand-to-sit;bed-to-chair/chair-to-bed;toilet;walker, rolling  -LM     [place] Level/Cues Needed (Transfer Goal 1, PT)  moderate assist (50-74% patient effort)  -LM     Time Frame (Transfer Goal 1, PT)  short term goal (STG);10 days  -LM     Progress/Outcome (Transfer Goal 1, PT)  goal ongoing  -LM     Row Name 11/05/20 1317          Gait Training Goal 1 (PT)    Activity/Assistive Device (Gait Training Goal 1, PT)  gait (walking locomotion);assistive device use;walker, rolling  -LM     [place] Level (Gait Training Goal 1, PT)  moderate assist (50-74% patient effort)  -LM     Distance (Gait Training Goal 1, PT)  20'  -LM     Time Frame (Gait Training Goal 1, PT)  short term goal (STG);10 days  -LM     Progress/Outcome (Gait Training Goal 1, PT)  goal ongoing  -LM     Row Name 11/05/20 1317          Patient Education Goal (PT)    Activity (Patient Education Goal, PT)  Patient will perform B LE ther ex x 15 reps.  -LM     [place]/Cues/Accuracy (Memory Goal 2, PT)  demonstrates  adequately  -LM     Time Frame (Patient Education Goal, PT)  short term goal (STG);10 days  -LM     Progress/Outcome (Patient Education Goal, PT)  goal ongoing  -LM       User Key  (r) = Recorded By, (t) = Taken By, (c) = Cosigned By    Initials Name Provider Type    LM Jeni Gracia, PT Physical Therapist        Clinical Impression     Row Name 11/05/20 1317          Pain    Additional Documentation  Pain Scale: FACES Pre/Post-Treatment (Group)  -LM     Row Name 11/05/20 1317          Pain Scale: FACES Pre/Post-Treatment    Pain: FACES Scale, Pretreatment  0-->no hurt  -LM     Posttreatment Pain Rating  0-->no hurt  -LM     Row Name 11/05/20 1317          Plan of Care Review    Plan of Care Reviewed With  patient  -LM     Progress  no change  -LM     Outcome Summary  PT re-eval completed since patient was transferred to ICU for higher level of care. Patient continues to experience confusion and presents with deficits in strength, balance, endurance and independence. He is expected to benefit from continued skilled PT intervention to improve his mobility status prior to D/C.  -LM     Row Name 11/05/20 1312          Therapy Assessment/Plan (PT)    Patient/Family Therapy Goals Statement (PT)  None stated.  -LM     Rehab Potential (PT)  fair, will monitor progress closely  -LM     Criteria for Skilled Interventions Met (PT)  yes;skilled treatment is necessary  -LM     Row Name 11/05/20 1319          Vital Signs    Pre Systolic BP Rehab  94  -LM     Pre Treatment Diastolic BP  56  -LM     Intra Systolic BP Rehab  105  -LM     Intra Treatment Diastolic BP  67  -LM     O2 Delivery Pre Treatment  room air  -LM     O2 Delivery Intra Treatment  room air  -LM     O2 Delivery Post Treatment  room air  -LM     Row Name 11/05/20 1313          Positioning and Restraints    Pre-Treatment Position  in bed  -LM     Post Treatment Position  bed  -LM     In Bed  side lying left;call light within reach;encouraged to call for  assist;patient within staff view  -LM       User Key  (r) = Recorded By, (t) = Taken By, (c) = Cosigned By    Initials Name Provider Type    Jeni Ventura PT Physical Therapist        Outcome Measures     Row Name 11/05/20 1317          How much help from another person do you currently need...    Turning from your back to your side while in flat bed without using bedrails?  2  -LM     Moving from lying on back to sitting on the side of a flat bed without bedrails?  2  -LM     Moving to and from a bed to a chair (including a wheelchair)?  1  -LM     Standing up from a chair using your arms (e.g., wheelchair, bedside chair)?  1  -LM     Climbing 3-5 steps with a railing?  1  -LM     To walk in hospital room?  1  -LM     AM-PAC 6 Clicks Score (PT)  8  -LM     Row Name 11/05/20 1317          Functional Assessment    Outcome Measure Options  AM-PAC 6 Clicks Basic Mobility (PT)  -LM       User Key  (r) = Recorded By, (t) = Taken By, (c) = Cosigned By    Initials Name Provider Type    Jeni Ventura, EDDIE Physical Therapist        Physical Therapy Education                 Title: PT OT SLP Therapies (Done)     Topic: Physical Therapy (Done)     Point: Mobility training (Done)     Learning Progress Summary           Patient Acceptance, E,TB, VU by  at 11/5/2020 1503    Comment: Purpose of PT.    Acceptance, E,TB, VU by  at 11/2/2020 1534    Comment: Ther ex ; purpose of PT.    Acceptance, D,E, DU,NR by  at 10/31/2020 1022    Comment: Pt education for LE ther ex technique with pt needing visual cues to perform correctly. Pt education also with rolling walker for transfer technique    Acceptance, E,TB, VU by  at 10/29/2020 1514    Comment: Purpose of PT; ther ex for HEP.    Acceptance, E,TB, VU by  at 10/28/2020 1600    Comment: Purpose of PT/POC.                   Point: Home exercise program (Done)     Learning Progress Summary           Patient Acceptance, E,TB, VU by  at 11/5/2020 1503    Comment: Purpose  of PT.    Acceptance, E,TB, VU by  at 11/2/2020 1534    Comment: Ther ex ; purpose of PT.    Acceptance, E,D, DU,NR by TW at 11/1/2020 1115    Comment: Pt education on LE ther ex technique. Pt falling asleep during session and will need reinforcement of ther ex.    Acceptance, D,E, DU,NR by TW at 10/31/2020 1022    Comment: Pt education for LE ther ex technique with pt needing visual cues to perform correctly. Pt education also with rolling walker for transfer technique    Acceptance, E,TB, VU by  at 10/29/2020 1514    Comment: Purpose of PT; ther ex for HEP.    Acceptance, E,TB, VU by  at 10/28/2020 1600    Comment: Purpose of PT/POC.                   Point: Precautions (Done)     Learning Progress Summary           Patient Acceptance, E,TB, VU by  at 11/5/2020 1503    Comment: Purpose of PT.    Acceptance, E,TB, VU by  at 11/2/2020 1534    Comment: Ther ex ; purpose of PT.    Acceptance, E,TB, VU by  at 10/29/2020 1514    Comment: Purpose of PT; ther ex for HEP.    Acceptance, E,TB, VU by  at 10/28/2020 1600    Comment: Purpose of PT/POC.                               User Key     Initials Effective Dates Name Provider Type Discipline     04/03/18 -  Jeni Gracia, PT Physical Therapist PT     03/26/19 -  Angela Mcelroy, PT Physical Therapist PT              PT Recommendation and Plan  Planned Therapy Interventions (PT): balance training, bed mobility training, gait training, transfer training, home exercise program, patient/family education, strengthening  Plan of Care Reviewed With: patient  Progress: no change  Outcome Summary: PT re-eval completed since patient was transferred to ICU for higher level of care. Patient continues to experience confusion and presents with deficits in strength, balance, endurance and independence. He is expected to benefit from continued skilled PT intervention to improve his mobility status prior to D/C.     Time Calculation:   PT Charges     Row Name 11/05/20  1317             Time Calculation    Start Time  1317  -LM      PT Received On  11/05/20  -LM      PT Goal Re-Cert Due Date  11/15/20  -LM        User Key  (r) = Recorded By, (t) = Taken By, (c) = Cosigned By    Initials Name Provider Type    Jeni Ventura, PT Physical Therapist        Therapy Charges for Today     Code Description Service Date Service Provider Modifiers Qty    [id number] HC PT RE-EVAL ESTABLISHED PLAN 2 11/5/2020 Jeni Gracia, PT GP 1          PT G-Codes  Outcome Measure Options: AM-PAC 6 Clicks Basic Mobility (PT)  AM-PAC 6 Clicks Score (PT): 8    Jeni Gracia PT  11/5/2020      Electronically signed by Jeni Gracia PT at 11/05/20 1504       Occupational Therapy Notes (last 24 hours) (Notes from 11/04/20 1518 through 11/05/20 1518)    No notes exist for this encounter.         Respiratory Therapy Notes (last 24 hours) (Notes from 11/04/20 1518 through 11/05/20 1518)    No notes exist for this encounter.

## 2020-11-05 NOTE — PLAN OF CARE
Problem: Adult Inpatient Plan of Care  Goal: Plan of Care Review  Recent Flowsheet Documentation  Taken 11/5/2020 1317 by Jeni Gracia, PT  Progress: no change  Plan of Care Reviewed With: patient  Outcome Summary: PT re-eval completed since patient was transferred to ICU for higher level of care. Patient continues to experience confusion and presents with deficits in strength, balance, endurance and independence. He is expected to benefit from continued skilled PT intervention to improve his mobility status prior to D/C.

## 2020-11-05 NOTE — PROGRESS NOTES
Patient remains in the ICU on Covid precautions.  Pt is viewed through the glass door.  He is sitting up in bed and appears to be agitated as he is pulling on his clothing.  Family wants to continue aggressive measures per family meeting yesterday.  CM is working on placement options as requested by family.  Palliative services will continue to follow.

## 2020-11-05 NOTE — PROGRESS NOTES
Continued Stay Note  LUIS EDUARDO Merino     Patient Name: Noe Martel  MRN: [id number]  Today's Date: 11/5/2020    Admit Date: 10/23/2020    Discharge Plan     Row Name 11/05/20 1521       Plan    Plan  Faxed clinicals to [facility] in [facility] for rehab at family request. Called them and they do have a Covid wing. They will review and notify us if they can accept him.        Discharge Codes    No documentation.       Expected Discharge Date and Time     Expected Discharge Date Expected Discharge Time    Nov 7, 2020             Anne Siddiqi LCSW

## 2020-11-05 NOTE — PLAN OF CARE
Problem: Noninvasive Ventilation Acute  Goal: Effective Unassisted Ventilation and Oxygenation  Outcome: Ongoing, Progressing     Problem: Noninvasive Ventilation Acute  Goal: Effective Unassisted Ventilation and Oxygenation  Intervention: Monitor and Manage Noninvasive Ventilation  Flowsheets (Taken 11/5/2020 1635)  Airway/Ventilation Management: airway patency maintained  NPPV/CPAP Maintenance: (PRN use / standby not in room) --     Problem: Noninvasive Ventilation Acute  Goal: Effective Unassisted Ventilation and Oxygenation  Intervention: Monitor and Manage Noninvasive Ventilation  Recent Flowsheet Documentation  Taken 11/5/2020 1635 by Karlos Lau III, RRT  Airway/Ventilation Management: airway patency maintained  NPPV/CPAP Maintenance: (PRN use / standby not in room) --     Problem: Respiratory Compromise (Pneumonia)  Goal: Effective Oxygenation and Ventilation  Intervention: Optimize Oxygenation and Ventilation  Recent Flowsheet Documentation  Taken 11/5/2020 1635 by Karlos Lau III, RRT  Airway/Ventilation Management: airway patency maintained     Problem: Respiratory Compromise (Sepsis/Septic Shock)  Goal: Effective Oxygenation and Ventilation  Intervention: Optimize Oxygenation and Ventilation  Recent Flowsheet Documentation  Taken 11/5/2020 1635 by Karlos Lau III, RRT  Airway/Ventilation Management: airway patency maintained

## 2020-11-05 NOTE — PLAN OF CARE
Goal Outcome Evaluation:  Plan of Care Reviewed With: patient  Progress: no change  Outcome Summary: Pt remains on levophed drip. Patient has started answering some questions but is still disoriented. Pulled out IV from left AC. All VSS. Will continue to monitor.

## 2020-11-05 NOTE — THERAPY RE-EVALUATION
Patient Name: Noe Martel  : 1935    MRN: [id number]                              Today's Date: 2020       Admit Date: 10/23/2020    Visit Dx:     ICD-10-CM ICD-9-CM   1. COVID-19  U07.1 079.89   2. Pneumonia of both lungs due to infectious organism, unspecified part of lung  J18.9 483.8   3. Acute on chronic respiratory failure with hypoxia (CMS/[facility])  J96.21 518.84     799.02   4. Acute renal failure, unspecified acute renal failure type (CMS/[facility])  N17.9 584.9   5. Transaminitis  R74.01 790.4   6. Acute respiratory failure with hypoxia (CMS/[facility])  J96.01 518.81   7. Chronic obstructive pulmonary disease, unspecified COPD type (CMS/[facility])  J44.9 496     Patient Active Problem List   Diagnosis   • Benign prostatic hyperplasia   • Hypertension   • CAD (coronary artery disease)   • Dyslipidemia   • Chronic back pain   • GERD (gastroesophageal reflux disease)   • COPD (chronic obstructive pulmonary disease) (CMS/[facility])   • COVID-19   • Acute respiratory failure with hypoxia (CMS/[facility])   • Sepsis, unspecified organism (CMS/[facility])     Past Medical History:   Diagnosis Date   • BPH (benign prostatic hyperplasia)    • CAD (coronary artery disease)     status post drug-eluting stent deployment in the ostium of the right coronary artery and balloon angioplasty of the obtuse marginal branch of the left circumflex, 2015.  Ejection fraction 60%.   • Chronic back pain    • COPD (chronic obstructive pulmonary disease) (CMS/[facility])    • Dyslipidemia      untreated.    • GERD (gastroesophageal reflux disease)    • Hyperlipidemia    • Hypertension    • Impaired functional mobility, balance, gait, and endurance      Past Surgical History:   Procedure Laterality Date   • CARDIAC SURGERY      STENT PLACEMENT   • CHOLECYSTECTOMY     • HERNIA REPAIR      bilateral     General Information     Row Name 20 1317          Physical Therapy Time and Intention    Document Type  re-evaluation  -     Mode of Treatment  physical  therapy  -LM     Row Name 11/05/20 1317          General Information    Patient Profile Reviewed  yes  -LM     Prior Level of Function  independent:;all household mobility  -LM     Existing Precautions/Restrictions  fall  -LM     Barriers to Rehab  medically complex;previous functional deficit;cognitive status;hearing deficit  -LM     Row Name 11/05/20 1317          Living Environment    Lives With  child(vasyl), adult;spouse  -LM     Row Name 11/05/20 1317          Cognition    Orientation Status (Cognition)  oriented to;person  -LM     Row Name 11/05/20 1317          Safety Issues, Functional Mobility    Safety Issues Affecting Function (Mobility)  ability to follow commands;awareness of need for assistance;at risk behavior observed;impulsivity;insight into deficits/self-awareness;judgment;safety precaution awareness;safety precautions follow-through/compliance  -LM     Impairments Affecting Function (Mobility)  balance;cognition;endurance/activity tolerance;shortness of breath;strength;postural/trunk control  -LM     Cognitive Impairments, Mobility Safety/Performance  awareness, need for assistance;impulsivity;insight into deficits/self-awareness;safety precaution awareness;safety precaution follow-through  -LM       User Key  (r) = Recorded By, (t) = Taken By, (c) = Cosigned By    Initials Name Provider Type    LM Jeni Gracia, PT Physical Therapist        Mobility     Row Name 11/05/20 1317          Bed Mobility    Bed Mobility  supine-sit;sit-supine  -LM     Supine-Sit [place] (Bed Mobility)  maximum assist (25% patient effort)  -LM     Sit-Supine [place] (Bed Mobility)  maximum assist (25% patient effort)  -LM     Assistive Device (Bed Mobility)  bed rails;head of bed elevated  -LM     Comment (Bed Mobility)  Pt sat EOB x 5 minutes with support varying from max to CGA. Fatigues quickly.  -LM     Row Name 11/05/20 1317          Transfers    Comment (Transfers)  Unable to perform sit to stand or OOB  activities.  -LM       User Key  (r) = Recorded By, (t) = Taken By, (c) = Cosigned By    Initials Name Provider Type    Jeni Ventura PT Physical Therapist        Obj/Interventions     Row Name 11/05/20 1317          Range of Motion Comprehensive    General Range of Motion  bilateral upper extremity ROM WFL;bilateral lower extremity ROM WFL  -LM     Comment, General Range of Motion  AAROM  -LM     Row Name 11/05/20 1317          Strength Comprehensive (MMT)    General Manual Muscle Testing (MMT) Assessment  upper extremity strength deficits identified;lower extremity strength deficits identified  -LM     Comment, General Manual Muscle Testing (MMT) Assessment  Grossly 3+/5  -LM     Row Name 11/05/20 1317          Balance    Static Sitting Balance  moderate impairment;supported;sitting, edge of bed  -LM     Dynamic Sitting Balance  moderate impairment;supported;sitting, edge of bed  -LM       User Key  (r) = Recorded By, (t) = Taken By, (c) = Cosigned By    Initials Name Provider Type    Jeni Ventura PT Physical Therapist        Goals/Plan     Row Name 11/05/20 1317          Bed Mobility Goal 1 (PT)    Activity/Assistive Device (Bed Mobility Goal 1, PT)  bed rails;sit to supine;supine to sit  -LM     [place] Level/Cues Needed (Bed Mobility Goal 1, PT)  minimum assist (75% or more patient effort)  -LM     Time Frame (Bed Mobility Goal 1, PT)  short term goal (STG);10 days  -LM     Progress/Outcomes (Bed Mobility Goal 1, PT)  goal ongoing  -LM     Row Name 11/05/20 1317          Transfer Goal 1 (PT)    Activity/Assistive Device (Transfer Goal 1, PT)  sit-to-stand/stand-to-sit;bed-to-chair/chair-to-bed;toilet;walker, rolling  -LM     [place] Level/Cues Needed (Transfer Goal 1, PT)  moderate assist (50-74% patient effort)  -LM     Time Frame (Transfer Goal 1, PT)  short term goal (STG);10 days  -LM     Progress/Outcome (Transfer Goal 1, PT)  goal ongoing  -LM     Row Name 11/05/20 1317          Gait  Training Goal 1 (PT)    Activity/Assistive Device (Gait Training Goal 1, PT)  gait (walking locomotion);assistive device use;walker, rolling  -LM     [place] Level (Gait Training Goal 1, PT)  moderate assist (50-74% patient effort)  -LM     Distance (Gait Training Goal 1, PT)  20'  -LM     Time Frame (Gait Training Goal 1, PT)  short term goal (STG);10 days  -LM     Progress/Outcome (Gait Training Goal 1, PT)  goal ongoing  -LM     Row Name 11/05/20 1317          Patient Education Goal (PT)    Activity (Patient Education Goal, PT)  Patient will perform B LE ther ex x 15 reps.  -LM     [place]/Cues/Accuracy (Memory Goal 2, PT)  demonstrates adequately  -LM     Time Frame (Patient Education Goal, PT)  short term goal (STG);10 days  -LM     Progress/Outcome (Patient Education Goal, PT)  goal ongoing  -LM       User Key  (r) = Recorded By, (t) = Taken By, (c) = Cosigned By    Initials Name Provider Type    LM Jeni Gracia, PT Physical Therapist        Clinical Impression     Row Name 11/05/20 1317          Pain    Additional Documentation  Pain Scale: FACES Pre/Post-Treatment (Group)  -LM     Row Name 11/05/20 1317          Pain Scale: FACES Pre/Post-Treatment    Pain: FACES Scale, Pretreatment  0-->no hurt  -LM     Posttreatment Pain Rating  0-->no hurt  -LM     Row Name 11/05/20 1317          Plan of Care Review    Plan of Care Reviewed With  patient  -LM     Progress  no change  -LM     Outcome Summary  PT re-eval completed since patient was transferred to ICU for higher level of care. Patient continues to experience confusion and presents with deficits in strength, balance, endurance and independence. He is expected to benefit from continued skilled PT intervention to improve his mobility status prior to D/C.  -LM     Row Name 11/05/20 1317          Therapy Assessment/Plan (PT)    Patient/Family Therapy Goals Statement (PT)  None stated.  -LM     Rehab Potential (PT)  fair, will monitor progress closely   -LM     Criteria for Skilled Interventions Met (PT)  yes;skilled treatment is necessary  -LM     Row Name 11/05/20 1317          Vital Signs    Pre Systolic BP Rehab  94  -LM     Pre Treatment Diastolic BP  56  -LM     Intra Systolic BP Rehab  105  -LM     Intra Treatment Diastolic BP  67  -LM     O2 Delivery Pre Treatment  room air  -LM     O2 Delivery Intra Treatment  room air  -LM     O2 Delivery Post Treatment  room air  -LM     Row Name 11/05/20 1317          Positioning and Restraints    Pre-Treatment Position  in bed  -LM     Post Treatment Position  bed  -LM     In Bed  side lying left;call light within reach;encouraged to call for assist;patient within staff view  -LM       User Key  (r) = Recorded By, (t) = Taken By, (c) = Cosigned By    Initials Name Provider Type    Jeni Ventura, PT Physical Therapist        Outcome Measures     Row Name 11/05/20 1317          How much help from another person do you currently need...    Turning from your back to your side while in flat bed without using bedrails?  2  -LM     Moving from lying on back to sitting on the side of a flat bed without bedrails?  2  -LM     Moving to and from a bed to a chair (including a wheelchair)?  1  -LM     Standing up from a chair using your arms (e.g., wheelchair, bedside chair)?  1  -LM     Climbing 3-5 steps with a railing?  1  -LM     To walk in hospital room?  1  -LM     AM-PAC 6 Clicks Score (PT)  8  -LM     Row Name 11/05/20 1317          Functional Assessment    Outcome Measure Options  AM-PAC 6 Clicks Basic Mobility (PT)  -LM       User Key  (r) = Recorded By, (t) = Taken By, (c) = Cosigned By    Initials Name Provider Type    Jeni Ventura, PT Physical Therapist        Physical Therapy Education                 Title: PT OT SLP Therapies (Done)     Topic: Physical Therapy (Done)     Point: Mobility training (Done)     Learning Progress Summary           Patient Acceptance, E,TB, VU by KATHY at 11/5/2020 1503    Comment:  Purpose of PT.    Acceptance, E,TB, VU by  at 11/2/2020 1534    Comment: Ther ex ; purpose of PT.    Acceptance, D,E, DU,NR by TW at 10/31/2020 1022    Comment: Pt education for LE ther ex technique with pt needing visual cues to perform correctly. Pt education also with rolling walker for transfer technique    Acceptance, E,TB, VU by  at 10/29/2020 1514    Comment: Purpose of PT; ther ex for HEP.    Acceptance, E,TB, VU by  at 10/28/2020 1600    Comment: Purpose of PT/POC.                   Point: Home exercise program (Done)     Learning Progress Summary           Patient Acceptance, E,TB, VU by  at 11/5/2020 1503    Comment: Purpose of PT.    Acceptance, E,TB, VU by  at 11/2/2020 1534    Comment: Ther ex ; purpose of PT.    Acceptance, E,D, DU,NR by TW at 11/1/2020 1115    Comment: Pt education on LE ther ex technique. Pt falling asleep during session and will need reinforcement of ther ex.    Acceptance, D,E, DU,NR by TW at 10/31/2020 1022    Comment: Pt education for LE ther ex technique with pt needing visual cues to perform correctly. Pt education also with rolling walker for transfer technique    Acceptance, E,TB, VU by  at 10/29/2020 1514    Comment: Purpose of PT; ther ex for HEP.    Acceptance, E,TB, VU by  at 10/28/2020 1600    Comment: Purpose of PT/POC.                   Point: Precautions (Done)     Learning Progress Summary           Patient Acceptance, E,TB, VU by  at 11/5/2020 1503    Comment: Purpose of PT.    Acceptance, E,TB, VU by  at 11/2/2020 1534    Comment: Ther ex ; purpose of PT.    Acceptance, E,TB, VU by  at 10/29/2020 1514    Comment: Purpose of PT; ther ex for HEP.    Acceptance, E,TB, VU by  at 10/28/2020 1600    Comment: Purpose of PT/POC.                               User Key     Initials Effective Dates Name Provider Type Discipline     04/03/18 -  Jeni Gracia, PT Physical Therapist PT     03/26/19 -  Angela Mcelroy, PT Physical Therapist PT               PT Recommendation and Plan  Planned Therapy Interventions (PT): balance training, bed mobility training, gait training, transfer training, home exercise program, patient/family education, strengthening  Plan of Care Reviewed With: patient  Progress: no change  Outcome Summary: PT re-eval completed since patient was transferred to ICU for higher level of care. Patient continues to experience confusion and presents with deficits in strength, balance, endurance and independence. He is expected to benefit from continued skilled PT intervention to improve his mobility status prior to D/C.     Time Calculation:   PT Charges     Row Name 11/05/20 1317             Time Calculation    Start Time  1317  -LM      PT Received On  11/05/20  -LM      PT Goal Re-Cert Due Date  11/15/20  -LM        User Key  (r) = Recorded By, (t) = Taken By, (c) = Cosigned By    Initials Name Provider Type     Jeni Gracia, PT Physical Therapist        Therapy Charges for Today     Code Description Service Date Service Provider Modifiers Qty    [id number]  PT RE-EVAL ESTABLISHED PLAN 2 11/5/2020 Jeni Gracia, PT GP 1          PT G-Codes  Outcome Measure Options: AM-PAC 6 Clicks Basic Mobility (PT)  AM-PAC 6 Clicks Score (PT): 8    Jeni Gracia PT  11/5/2020

## 2020-11-05 NOTE — PROGRESS NOTES
[facility]    Name:  Noe Martel   Age:  85 y.o.  Sex:  male  :  1935  MRN:  [id number]   Visit Number:  [id number]  Admission Date:  10/23/2020  Date Of Service:  20  Primary Care Physician:  Devika Foy APRN     LOS: 13 days :  Patient Care Team:  Devika Foy APRN as PCP - General (Family Medicine):    Chief Complaint:      Follow-up of generalized weakness, hypotension and hypothermia.    Subjective:     Mr. Martel was seen and examined this morning.  He seems to be a new person this morning and is more alert and answering questions and talking.  He is on low dose Levophed drip this morning.  He was able to eat a few bites of his breakfast.  He does seem to be fidgety and agitated at times trying to get out of bed.  He was able to answer a few questions but does get confused at times.  He has remained stable with regards to his hemoglobin after he received 2 units of packed red blood cells.  No further episodes of melena.  His hypothermia has resolved.  No significant overnight events.    Hospital course:    Mr. Martel is an 85-year-old male with history of coronary artery disease, COPD, hypertension was brought to the emergency room by his family on 10/23/2020 with progressive weakness and decreased word output.  Patient's son reportedly has been sick and was diagnosed with Covid recently.  Patient has been feeling weak and has not been walking for a few days.  He was noted to have hypoxia in the emergency room at 68% and was placed on nonrebreather mask followed by BiPAP therapy.  His COVID-19 test was positive.  Patient was admitted to the ICU and was placed on IV antibiotics therapy with Rocephin and Levaquin as well as IV dexamethasone.  He was started on remdesivir.  Due to his renal failure, nephrology was consulted.  Patient was placed on IV fluids for dehydration and renal failure.  His initial CT of the head was negative for acute  intracranial abnormalities but showed chronic ischemic microvascular disease.  Patient has not been mostly non-verbal throughout the hospital stay but was able to eat at least 50% of his meals later in the hospital course.    Once the renal function improved, he did complete the course of remdesivir.  He also completed a course of Rocephin and Levaquin.  He was transferred out of ICU to the [facility] floor.  He was started on physical and Occupational Therapy due to significant generalized weakness.  Case management was consulted for possible rehab placement but the family refused rehabilitation.  Patient was planned for discharge on 11/3/2020 with home health services but could not be discharged that day due to inability to arrange home health.  Patient continued to develop hypotension the next day and was noted to have worsening renal failure.  He was also noted to have dark-colored stools and diarrhea.  He had to be transferred back to the ICU and was suspected of septic shock possibly secondary to urinary tract infection.  He was also thought to have GI bleeding and was transfused with 2 units of packed red blood cells.  He was placed on IV Protonix.  Dr. Hernandez was consulted from surgical services.  Patient had to be placed on Levophed drip to maintain blood pressures and a right femoral central venous line was placed.  He was placed on broad-spectrum IV antibiotics therapy with Zosyn and vancomycin.  Patient improved over the next 48 hours with regards to his hypotension.  He was able to verbalize and became more alert with antibiotics therapy and Levophed drip.  His vancomycin therapy was subsequently discontinued.  He was started on puréed diet with nectar thick liquids as per speech recommendations.  He was started on physical and Occupational Therapy.    Review of Systems:     Does not seem to be in any distress.  Unable to obtain review of systems as he is confused.    Vital Signs:    Temp:  [97.7 °F (36.5  °C)-97.9 °F (36.6 °C)] 97.7 °F (36.5 °C)  Heart Rate:  [] 92  Resp:  [16-26] 26  BP: ()/() 88/46    Intake and output:    I/O last 3 completed shifts:  In: 7283 [I.V.:6520; Blood:713; IV Piggyback:50]  Out: 1715 [Urine:1715]  I/O this shift:  In: 120 [P.O.:120]  Out: -     Physical Examination:    General Appearance:  Alert and oriented to person, does not seem to be in any acute distress.  Does get fidgety occasionally.   Head:  Atraumatic and normocephalic, without obvious abnormality.   Eyes:          PERRLA, conjunctivae and sclerae normal, no Icterus. No pallor.   Neck: Supple, trachea midline, no thyromegaly, no carotid bruit.   Lungs:   Chest shape is normal. Breath sounds heard bilaterally equally.  No wheezing.  Occasional basal crackles heard.  No pleural rub or bronchial breathing.   Heart:  Normal S1 and S2, no murmur, no gallop, no rub. No JVD   Abdomen:   Normal bowel sounds, no masses, no organomegaly. Soft, nontender, nondistended, no guarding, no rebound.  Condom catheter is in place.  Tenderness. Ecchymotic patches noted on the abdominal wall.   Extremities: Moves all extremities, no edema, no cyanosis, no clubbing.  Right femoral central venous line noted.   Skin: No bleeding. Skin excoriation noted in the right hip area.  No erythema.   Neurologic: Alert on but confused at times.  Answers a few questions appropriately. Moves all 4 extremities but does have generalized weakness.     Laboratory results:    Results from last 7 days   Lab Units 11/05/20  0452 11/04/20  0033 11/03/20  0628  10/31/20  0745   SODIUM mmol/L 153* 148* 145   < > 138   POTASSIUM mmol/L 3.8 4.6 4.6   < > 4.3   CHLORIDE mmol/L 127* 116* 113*   < > 106   CO2 mmol/L 17.0* 18.0* 20.8*   < > 23.2   BUN mg/dL 75* 115* 97*   < > 24*   CREATININE mg/dL 2.04* 2.42* 1.48*   < > 1.06   CALCIUM mg/dL 6.7* 6.8* 7.5*   < > 7.4*   BILIRUBIN mg/dL 0.6 0.4  --   --  0.7   ALK PHOS U/L 58 42  --   --  46   ALT (SGPT) U/L 19  20  --   --  36   AST (SGOT) U/L 16 18  --   --  33   GLUCOSE mg/dL 139* 150* 248*   < > 94    < > = values in this interval not displayed.     Results from last 7 days   Lab Units 11/05/20  0452 11/04/20  1202 11/04/20  0033 11/03/20  0627   WBC 10*3/mm3 27.32*  --  36.85* 21.89*   HEMOGLOBIN g/dL 8.9* 10.4* 8.0* 10.1*   HEMATOCRIT % 28.2* 31.1* 24.8* 31.1*   PLATELETS 10*3/mm3 188  --  208 221         Results from last 7 days   Lab Units 11/05/20  0452 11/04/20  0033   CK TOTAL U/L  --  245*   TROPONIN T ng/mL 0.038*  --      Results from last 7 days   Lab Units 11/04/20  0830 11/04/20  0823   BLOODCX  No growth at 24 hours No growth at 24 hours     Results from last 7 days   Lab Units 11/04/20  0337   PH, ARTERIAL pH units 7.463   PO2 ART mm Hg 61.8*   PCO2, ARTERIAL mm Hg 22.1*   HCO3 ART mmol/L 15.8*     I have reviewed the patient's laboratory results.    Radiology results:    Imaging Results (Last 24 Hours)     ** No results found for the last 24 hours. **        Medication Review:     I have reviewed the patient's active and prn medications.       Acute respiratory failure with hypoxia (CMS/HCC)    Hypertension    CAD (coronary artery disease)    GERD (gastroesophageal reflux disease)    COVID-19    Sepsis, unspecified organism (CMS/HCC)    Assessment:    1.  Acute hypoxic respiratory failure secondary to #2, present on admission.  2.  Patchy bilateral COVID-19 pneumonia, present on admission, improved.  3.  Acute renal failure, present on admission, not related to sepsis, resolved.  4.  Sepsis secondary to #2, present on admission, resolved.  5.  Septic shock developed on 11/3/2020, not present on admission.  6.  Suspected bilateral pyelonephritis.  7.  Acute blood loss anemia likely secondary to upper GI bleeding, status post 2 units PRBC.  8.  Essential hypertension.  9.  Coronary artery disease.  10.  Gastroesophageal reflux disease.  11.  Suspected underlying dementia.    Plan:    Mr. Martel seems to  have improved significantly compared to yesterday especially with regards to his cognitive abilities.  He seems to be more alert today.  We will start him on oral diet.  We will continue IV antibiotics therapy with Zosyn for suspected urinary tract infection.  Nasal swab for MRSA was negative and I will discontinue vancomycin.  Blood cultures have been negative so far.  Hopefully we will also taper off his Levophed drip today.  He will be continued on IV fluids.    Patient will be started on physical and Occupational Therapy.  He likely has underlying mild cognitive dysfunction and may be developing dementia.  Once he is off his Levophed drip, he may be able to move out of the ICU.  Discussed with nursing staff and multidisciplinary team.    Sushil Shane MD  11/05/20  14:29 EST    Dictated utilizing Dragon dictation.

## 2020-11-05 NOTE — PROGRESS NOTES
Adult Nutrition  Assessment/PES    Patient Name:  Noe Martel  YOB: 1935  MRN: [id number]  Admit Date:  10/23/2020    Assessment Date:  11/5/2020    Comments:    Recommend:  1. Continue current diet order as medically appropriate and tolerated.  2. Encourage PO intake. PO intake average ~25% x 1 meal since recent diet advancement.  3. Continue with Boost Pudding BID, Prostat BID, and a Magic Cup daily.  4. Consider a multivitamin with minerals daily.  5. If PO intake average continues to be poor, consider nutrition support.     RD to follow pt and available PRN.    Reason for Assessment     Row Name 11/05/20 1237          Reason for Assessment    Reason For Assessment  follow-up protocol     Diagnosis  cardiac disease;infection/sepsis;pulmonary disease;renal disease CAD, COPD, HTN, HLD, GERD, Sepsis, COVID, PNA, Grace     Identified At Risk by Screening Criteria  large or nonhealing wound, burn or pressure injury;difficulty chewing/swallowing             Labs/Tests/Procedures/Meds     Row Name 11/05/20 1235          Labs/Procedures/Meds    Lab Results Reviewed  reviewed, pertinent     Lab Results Comments  Low: Alb High: Na+, Cl-, Gluc, BUN, Cr        Medications    Pertinent Medications Reviewed  reviewed, pertinent     Pertinent Medications Comments  Novolin R, Protonix, Pro-stat         Physical Findings     Row Name 11/05/20 1255          Physical Findings    Skin  pressure injury;other (see comments) Bilateral gluteal and right heel pressure injuries, MASD           Nutrition Prescription Ordered     Row Name 11/05/20 1256          Nutrition Prescription PO    Current PO Diet  Dysphagia     Dysphagia Level  2  Pureed     Fluid Consistency  Nectar/syrup thick     Supplement  Boost Pudding (Ensure Pudding);Magic Cup;ProStat     Supplement Frequency  2 times a day;Daily         Evaluation of Received Nutrient/Fluid Intake     Row Name 11/05/20 1258          PO Evaluation    Number of Days PO  Intake Evaluated  1 day     Number of Meals  1     % PO Intake  25               Problem/Interventions:  Problem 1     Row Name 11/05/20 1300          Nutrition Diagnoses Problem 1    Problem 1  Increased Nutrient Needs     Macronutrient  Kcal;Fluid;Protein     Etiology (related to)  Medical Diagnosis     Infectious Disease  Sepsis     Pulmonary/Critical Care  Pneumonia;COPD;Other (comment) COVID     Skin  Pressure injury     Signs/Symptoms (evidenced by)  Report/Observation     Reported/Observed By  MD         Problem 2     Row Name 11/05/20 1301          Nutrition Diagnoses Problem 2    Problem 2  Altered Nutrition Related to Labs     Etiology (related to)  Medical Diagnosis     Endocrine  Hyperglycemia     Renal  SANG     Signs/Symptoms (evidenced by)  Biochemical     Specific Labs Noted  BUN;Creatinine;Glucose         Problem 3     Row Name 11/05/20 1301          Nutrition Diagnoses Problem 3    Problem 3  Inadequate Intake/Infusion     Inadequate Intake Type  Oral     Macronutrient  Kcal;Fat;Fluid;Fiber;Carbohydrate;Protein     Micronutrient  Vitamin;Mineral     Etiology (related to)  MNT for Treatment/Condition     Signs/Symptoms (evidenced by)  NPO     Resolved?  Yes Diet order advanced           Intervention Goal     Row Name 11/05/20 1302          Intervention Goal    General  Meet nutritional needs for age/condition;Improved nutrition related lab(s)     PO  Meet estimated needs;Increase intake;PO intake (%)     PO Intake %  50 %     Weight  Maintain weight         Nutrition Intervention     Row Name 11/05/20 1302          Nutrition Intervention    RD/Tech Action  Follow Tx progress;Encourage intake;Recommend/ordered     Recommended/Ordered  EN         Nutrition Prescription     Row Name 11/05/20 1302          Nutrition Prescription PO    PO Prescription  Other (comment) Continue current diet order as medically appropriate and tolerated        Nutrition Prescription EN    Enteral Prescription  Enteral  begin/change For nutrition support     New EN Prescription Ordered?  No, recommended        Other Orders    Obtain Weight  Daily     Obtain Weight Ordered?  No, recommended     Supplement  Vitamin mineral supplement     Supplement Ordered?  No, recommended         Education/Evaluation     Row Name 11/05/20 7153          Education    Education  Education not appropriate at this time     Please explain  Defer until post ICU;Other (comment) Isolation status        Monitor/Evaluation    Monitor  Per protocol;I&O;PO intake;Pertinent labs;Supplement intake;Weight;Skin status           Electronically signed by:  Sangeeta Gonzales RD  11/05/20 13:04 EST

## 2020-11-05 NOTE — PLAN OF CARE
Goal Outcome Evaluation:  Plan of Care Reviewed With: patient  PT MUCH MORE ALERT TODAY AND TOLERATING ORAL INTAKE. NO ACUTE EVENTS. NOREPINEPHRINE DRIP STOPPED AND B/P'S REMAIN STABLE. NO STOOLS TODAY AND NO BLEEDING NOTED. PLANNING D/C TO REHAB FACILITY.

## 2020-11-06 LAB
ANION GAP SERPL CALCULATED.3IONS-SCNC: 6.8 MMOL/L (ref 5–15)
BUN SERPL-MCNC: 46 MG/DL (ref 8–23)
BUN/CREAT SERPL: 28.2 (ref 7–25)
CALCIUM SPEC-SCNC: 6.8 MG/DL (ref 8.6–10.5)
CHLORIDE SERPL-SCNC: 132 MMOL/L (ref 98–107)
CO2 SERPL-SCNC: 20.2 MMOL/L (ref 22–29)
CREAT SERPL-MCNC: 1.63 MG/DL (ref 0.76–1.27)
D-LACTATE SERPL-SCNC: 1.9 MMOL/L (ref 0.5–2)
DEPRECATED RDW RBC AUTO: 56.5 FL (ref 37–54)
ERYTHROCYTE [DISTWIDTH] IN BLOOD BY AUTOMATED COUNT: 17 % (ref 12.3–15.4)
GFR SERPL CREATININE-BSD FRML MDRD: 41 ML/MIN/1.73
GLUCOSE BLDC GLUCOMTR-MCNC: 100 MG/DL (ref 70–130)
GLUCOSE BLDC GLUCOMTR-MCNC: 101 MG/DL (ref 70–130)
GLUCOSE BLDC GLUCOMTR-MCNC: 117 MG/DL (ref 70–130)
GLUCOSE BLDC GLUCOMTR-MCNC: 86 MG/DL (ref 70–130)
GLUCOSE SERPL-MCNC: 111 MG/DL (ref 65–99)
HCT VFR BLD AUTO: 24.6 % (ref 37.5–51)
HGB BLD-MCNC: 7.7 G/DL (ref 13–17.7)
IRON 24H UR-MRATE: 53 MCG/DL (ref 59–158)
IRON SATN MFR SERPL: 38 % (ref 20–50)
MCH RBC QN AUTO: 29.1 PG (ref 26.6–33)
MCHC RBC AUTO-ENTMCNC: 31.3 G/DL (ref 31.5–35.7)
MCV RBC AUTO: 92.8 FL (ref 79–97)
PLATELET # BLD AUTO: 134 10*3/MM3 (ref 140–450)
PMV BLD AUTO: 11.2 FL (ref 6–12)
POTASSIUM SERPL-SCNC: 3.9 MMOL/L (ref 3.5–5.2)
RBC # BLD AUTO: 2.65 10*6/MM3 (ref 4.14–5.8)
SODIUM SERPL-SCNC: 159 MMOL/L (ref 136–145)
TIBC SERPL-MCNC: 139 MCG/DL (ref 298–536)
TRANSFERRIN SERPL-MCNC: 93 MG/DL (ref 200–360)
WBC # BLD AUTO: 14.55 10*3/MM3 (ref 3.4–10.8)

## 2020-11-06 PROCEDURE — 83605 ASSAY OF LACTIC ACID: CPT | Performed by: INTERNAL MEDICINE

## 2020-11-06 PROCEDURE — 36430 TRANSFUSION BLD/BLD COMPNT: CPT

## 2020-11-06 PROCEDURE — 84466 ASSAY OF TRANSFERRIN: CPT | Performed by: INTERNAL MEDICINE

## 2020-11-06 PROCEDURE — 85027 COMPLETE CBC AUTOMATED: CPT | Performed by: INTERNAL MEDICINE

## 2020-11-06 PROCEDURE — 25010000002 PIPERACILLIN SOD-TAZOBACTAM PER 1 G: Performed by: INTERNAL MEDICINE

## 2020-11-06 PROCEDURE — 80048 BASIC METABOLIC PNL TOTAL CA: CPT | Performed by: INTERNAL MEDICINE

## 2020-11-06 PROCEDURE — 97166 OT EVAL MOD COMPLEX 45 MIN: CPT

## 2020-11-06 PROCEDURE — 82962 GLUCOSE BLOOD TEST: CPT

## 2020-11-06 PROCEDURE — 83540 ASSAY OF IRON: CPT | Performed by: INTERNAL MEDICINE

## 2020-11-06 PROCEDURE — 97530 THERAPEUTIC ACTIVITIES: CPT

## 2020-11-06 PROCEDURE — 25010000002 PIPERACILLIN SOD-TAZOBACTAM PER 1 G: Performed by: EMERGENCY MEDICINE

## 2020-11-06 PROCEDURE — 86900 BLOOD TYPING SEROLOGIC ABO: CPT

## 2020-11-06 PROCEDURE — P9016 RBC LEUKOCYTES REDUCED: HCPCS

## 2020-11-06 PROCEDURE — 99232 SBSQ HOSP IP/OBS MODERATE 35: CPT | Performed by: INTERNAL MEDICINE

## 2020-11-06 RX ORDER — DEXTROSE MONOHYDRATE 50 MG/ML
100 INJECTION, SOLUTION INTRAVENOUS CONTINUOUS
Status: ACTIVE | OUTPATIENT
Start: 2020-11-06 | End: 2020-11-06

## 2020-11-06 RX ORDER — LORAZEPAM 0.5 MG/1
1 TABLET ORAL
Status: CANCELLED | OUTPATIENT
Start: 2020-11-06

## 2020-11-06 RX ORDER — LORAZEPAM 0.5 MG/1
1 TABLET ORAL ONCE
Status: COMPLETED | OUTPATIENT
Start: 2020-11-06 | End: 2020-11-06

## 2020-11-06 RX ORDER — ALPRAZOLAM 0.5 MG/1
0.5 TABLET ORAL 3 TIMES DAILY PRN
Status: DISCONTINUED | OUTPATIENT
Start: 2020-11-06 | End: 2020-11-07

## 2020-11-06 RX ORDER — FERROUS SULFATE 300 MG/5ML
300 LIQUID (ML) ORAL 2 TIMES DAILY
Status: DISCONTINUED | OUTPATIENT
Start: 2020-11-06 | End: 2020-11-12 | Stop reason: HOSPADM

## 2020-11-06 RX ADMIN — MINERAL SUPPLEMENT IRON 300 MG / 5 ML STRENGTH LIQUID 100 PER BOX UNFLAVORED 300 MG: at 21:08

## 2020-11-06 RX ADMIN — Medication 30 ML: at 08:22

## 2020-11-06 RX ADMIN — TAZOBACTAM SODIUM AND PIPERACILLIN SODIUM 3.38 G: 375; 3 INJECTION, SOLUTION INTRAVENOUS at 17:09

## 2020-11-06 RX ADMIN — DEXTROSE AND SODIUM CHLORIDE 100 ML/HR: 5; 900 INJECTION, SOLUTION INTRAVENOUS at 05:04

## 2020-11-06 RX ADMIN — QUETIAPINE FUMARATE 25 MG: 25 TABLET ORAL at 21:08

## 2020-11-06 RX ADMIN — BUDESONIDE AND FORMOTEROL FUMARATE DIHYDRATE 2 PUFF: 160; 4.5 AEROSOL RESPIRATORY (INHALATION) at 08:17

## 2020-11-06 RX ADMIN — TAZOBACTAM SODIUM AND PIPERACILLIN SODIUM 3.38 G: 375; 3 INJECTION, SOLUTION INTRAVENOUS at 08:16

## 2020-11-06 RX ADMIN — DEXTROSE MONOHYDRATE 100 ML/HR: 50 INJECTION, SOLUTION INTRAVENOUS at 11:04

## 2020-11-06 RX ADMIN — MINERAL SUPPLEMENT IRON 300 MG / 5 ML STRENGTH LIQUID 100 PER BOX UNFLAVORED 300 MG: at 11:04

## 2020-11-06 RX ADMIN — ALPRAZOLAM 0.5 MG: 0.5 TABLET ORAL at 21:09

## 2020-11-06 RX ADMIN — MULTIPLE VITAMINS W/ MINERALS TAB 1 TABLET: TAB at 08:16

## 2020-11-06 RX ADMIN — Medication 30 ML: at 21:08

## 2020-11-06 RX ADMIN — PANTOPRAZOLE SODIUM 40 MG: 40 INJECTION, POWDER, FOR SOLUTION INTRAVENOUS at 08:16

## 2020-11-06 RX ADMIN — PANTOPRAZOLE SODIUM 40 MG: 40 INJECTION, POWDER, FOR SOLUTION INTRAVENOUS at 21:08

## 2020-11-06 RX ADMIN — SODIUM CHLORIDE, PRESERVATIVE FREE 10 ML: 5 INJECTION INTRAVENOUS at 08:16

## 2020-11-06 RX ADMIN — TAZOBACTAM SODIUM AND PIPERACILLIN SODIUM 3.38 G: 375; 3 INJECTION, SOLUTION INTRAVENOUS at 23:24

## 2020-11-06 RX ADMIN — NYSTATIN 500000 UNITS: 500000 SUSPENSION ORAL at 21:08

## 2020-11-06 RX ADMIN — BUDESONIDE AND FORMOTEROL FUMARATE DIHYDRATE 2 PUFF: 160; 4.5 AEROSOL RESPIRATORY (INHALATION) at 20:00

## 2020-11-06 RX ADMIN — LORAZEPAM 1 MG: 0.5 TABLET ORAL at 01:49

## 2020-11-06 RX ADMIN — NYSTATIN 500000 UNITS: 500000 SUSPENSION ORAL at 08:16

## 2020-11-06 RX ADMIN — NYSTATIN 500000 UNITS: 500000 SUSPENSION ORAL at 11:04

## 2020-11-06 RX ADMIN — ASPIRIN 81 MG CHEWABLE TABLET 81 MG: 81 TABLET CHEWABLE at 08:16

## 2020-11-06 RX ADMIN — ALPRAZOLAM 0.5 MG: 0.5 TABLET ORAL at 15:48

## 2020-11-06 NOTE — PLAN OF CARE
Problem: Adult Inpatient Plan of Care  Goal: Plan of Care Review  Recent Flowsheet Documentation  Taken 11/6/2020 1426 by Elvia Red OT  Progress: no change  Plan of Care Reviewed With: patient  Outcome Summary: OT eval completed. Patient presents deficits in strength, endurance, balance, mobility, and ADL performance. Patient is expected to benefit from continued OT services prior to DC.

## 2020-11-06 NOTE — THERAPY EVALUATION
Patient Name: Noe Martel  : 1935    MRN: [id number]                              Today's Date: 2020       Admit Date: 10/23/2020    Visit Dx:     ICD-10-CM ICD-9-CM   1. COVID-19  U07.1 079.89   2. Pneumonia of both lungs due to infectious organism, unspecified part of lung  J18.9 483.8   3. Acute on chronic respiratory failure with hypoxia (CMS/[facility])  J96.21 518.84     799.02   4. Acute renal failure, unspecified acute renal failure type (CMS/[facility])  N17.9 584.9   5. Transaminitis  R74.01 790.4   6. Acute respiratory failure with hypoxia (CMS/[facility])  J96.01 518.81   7. Chronic obstructive pulmonary disease, unspecified COPD type (CMS/[facility])  J44.9 496     Patient Active Problem List   Diagnosis   • Benign prostatic hyperplasia   • Hypertension   • CAD (coronary artery disease)   • Dyslipidemia   • Chronic back pain   • GERD (gastroesophageal reflux disease)   • COPD (chronic obstructive pulmonary disease) (CMS/[facility])   • COVID-19   • Acute respiratory failure with hypoxia (CMS/[facility])   • Sepsis, unspecified organism (CMS/[facility])     Past Medical History:   Diagnosis Date   • BPH (benign prostatic hyperplasia)    • CAD (coronary artery disease)     status post drug-eluting stent deployment in the ostium of the right coronary artery and balloon angioplasty of the obtuse marginal branch of the left circumflex, 2015.  Ejection fraction 60%.   • Chronic back pain    • COPD (chronic obstructive pulmonary disease) (CMS/[facility])    • Dyslipidemia      untreated.    • GERD (gastroesophageal reflux disease)    • Hyperlipidemia    • Hypertension    • Impaired functional mobility, balance, gait, and endurance      Past Surgical History:   Procedure Laterality Date   • CARDIAC SURGERY      STENT PLACEMENT   • CHOLECYSTECTOMY     • HERNIA REPAIR      bilateral     General Information     Row Name 20 1423          OT Time and Intention    Document Type  evaluation  -SD     Mode of Treatment  occupational therapy  -SD      Row Name 11/06/20 1423          General Information    Patient Profile Reviewed  yes  -SD     Prior Level of Function  independent:;all household mobility  -SD     Existing Precautions/Restrictions  fall  -SD     Barriers to Rehab  previous functional deficit;cognitive status  -SD     Row Name 11/06/20 1423          Living Environment    Lives With  spouse  -SD     Row Name 11/06/20 1423          Safety Issues, Functional Mobility    Impairments Affecting Function (Mobility)  balance;endurance/activity tolerance;strength;shortness of breath  -SD       User Key  (r) = Recorded By, (t) = Taken By, (c) = Cosigned By    Initials Name Provider Type    SD Elvia Red OT Occupational Therapist        Mobility/ADL's     Row Name 11/06/20 1424          Bed Mobility    Bed Mobility  supine-sit;sit-supine  -SD     Supine-Sit [place] (Bed Mobility)  minimum assist (75% patient effort);2 person assist  -SD     Sit-Supine [place] (Bed Mobility)  minimum assist (75% patient effort);2 person assist  -SD     Bed Mobility, Safety Issues  decreased use of arms for pushing/pulling;decreased use of legs for bridging/pushing;impaired trunk control for bed mobility  -SD     Assistive Device (Bed Mobility)  bed rails;head of bed elevated;draw sheet  -SD     Comment (Bed Mobility)  bedside chair 20 mins  -SD     Row Name 11/06/20 1424          Transfers    Transfers  sit-stand transfer;bed-chair transfer  -SD     Bed-Chair [place] (Transfers)  moderate assist (50% patient effort);2 person assist  -SD     Assistive Device (Bed-Chair Transfers)  other (see comments) gat belt  -SD     Sit-Stand [place] (Transfers)  moderate assist (50% patient effort);2 person assist  -SD     Row Name 11/06/20 1424          Sit-Stand Transfer    Assistive Device (Sit-Stand Transfers)  other (see comments) gait belt  -SD     Row Name 11/06/20 1424          Activities of Daily Living    BADL Assessment/Intervention  bathing;upper  body dressing;lower body dressing;grooming;feeding;toileting  -SD     Row Name 11/06/20 1424          Bathing Assessment/Intervention    [place] Level (Bathing)  maximum assist (25% patient effort)  -SD     Row Name 11/06/20 1424          Upper Body Dressing Assessment/Training    [place] Level (Upper Body Dressing)  moderate assist (50% patient effort)  -SD     Row Name 11/06/20 1424          Lower Body Dressing Assessment/Training    [place] Level (Lower Body Dressing)  maximum assist (25% patient effort)  -SD     Row Name 11/06/20 1424          Grooming Assessment/Training    [place] Level (Grooming)  moderate assist (50% patient effort)  -SD     Row Name 11/06/20 1424          Self-Feeding Assessment/Training    [place] Level (Feeding)  moderate assist (50% patient effort)  -SD     Row Name 11/06/20 1424          Toileting Assessment/Training    [place] Level (Toileting)  maximum assist (25% patient effort)  -SD       User Key  (r) = Recorded By, (t) = Taken By, (c) = Cosigned By    Initials Name Provider Type    Elvia Garcia OT Occupational Therapist        Obj/Interventions     Row Name 11/06/20 1426          Range of Motion Comprehensive    General Range of Motion  bilateral upper extremity ROM WFL  -SD     Row Name 11/06/20 1426          Strength Comprehensive (MMT)    General Manual Muscle Testing (MMT) Assessment  upper extremity strength deficits identified  -SD     Comment, General Manual Muscle Testing (MMT) Assessment  UB 3+/5  -SD       User Key  (r) = Recorded By, (t) = Taken By, (c) = Cosigned By    Initials Name Provider Type    Elvia Garcia OT Occupational Therapist        Goals/Plan     Row Name 11/06/20 1427          Transfer Goal 1 (OT)    Activity/Assistive Device (Transfer Goal 1, OT)  sit-to-stand/stand-to-sit;walker, rolling  -SD     [place] Level/Cues Needed (Transfer Goal 1, OT)  moderate assist (50-74% patient effort)  -SD     Time Frame  (Transfer Goal 1, OT)  long term goal (LTG)  -SD     Progress/Outcome (Transfer Goal 1, OT)  goal ongoing  -SD     Row Name 11/06/20 1427          Dressing Goal 1 (OT)    Activity/Device (Dressing Goal 1, OT)  upper body dressing  -SD     [place]/Cues Needed (Dressing Goal 1, OT)  minimum assist (75% or more patient effort)  -SD     Time Frame (Dressing Goal 1, OT)  2 weeks  -SD     Progress/Outcome (Dressing Goal 1, OT)  goal ongoing  -SD     Row Name 11/06/20 1427          Toileting Goal 1 (OT)    Activity/Device (Toileting Goal 1, OT)  toileting skills, all;commode, bedside without drop arms  -SD     [place] Level/Cues Needed (Toileting Goal 1, OT)  moderate assist (50-74% patient effort)  -SD     Time Frame (Toileting Goal 1, OT)  2 weeks  -SD     Progress/Outcome (Toileting Goal 1, OT)  goal ongoing  -SD     Row Name 11/06/20 1427          Strength Goal 1 (OT)    Strength Goal 1 (OT)  Patient to perform UB ther ex as tolerated  -SD     Time Frame (Strength Goal 1, OT)  long term goal (LTG)  -SD     Progress/Outcome (Strength Goal 1, OT)  goal ongoing  -SD     Row Name 11/06/20 1427          Therapy Assessment/Plan (OT)    Planned Therapy Interventions (OT)  activity tolerance training;adaptive equipment training;BADL retraining;patient/caregiver education/training;strengthening exercise;transfer/mobility retraining  -SD       User Key  (r) = Recorded By, (t) = Taken By, (c) = Cosigned By    Initials Name Provider Type    Elvia Garcia OT Occupational Therapist        Clinical Impression     Row Name 11/06/20 1426          Pain Scale: Numbers Pre/Post-Treatment    Pretreatment Pain Rating  0/10 - no pain  -SD     Posttreatment Pain Rating  0/10 - no pain  -SD     Row Name 11/06/20 1426          Plan of Care Review    Plan of Care Reviewed With  patient  -SD     Progress  no change  -SD     Outcome Summary  OT eval completed. Patient presents deficits in strength, endurance, balance, mobility,  and ADL performance. Patient is expected to benefit from continued OT services prior to DC.  -SD     Row Name 11/06/20 1426          Therapy Assessment/Plan (OT)    Rehab Potential (OT)  fair, will monitor progress closely  -SD     Criteria for Skilled Therapeutic Interventions Met (OT)  skilled treatment is necessary  -SD     Therapy Frequency (OT)  3 times/wk 5 times if indicated  -SD     Row Name 11/06/20 1426          Vital Signs    Pre SpO2 (%)  96  -SD     O2 Delivery Pre Treatment  room air  -SD     Post SpO2 (%)  100  -SD     O2 Delivery Post Treatment  room air  -SD     Row Name 11/06/20 1426          Positioning and Restraints    Pre-Treatment Position  in bed  -SD     Post Treatment Position  bed  -SD     In Bed  fowlers;call light within reach;encouraged to call for assist  -SD       User Key  (r) = Recorded By, (t) = Taken By, (c) = Cosigned By    Initials Name Provider Type    Elvia Garcia OT Occupational Therapist        Outcome Measures     Row Name 11/06/20 1428          How much help from another is currently needed...    Putting on and taking off regular lower body clothing?  2  -SD     Bathing (including washing, rinsing, and drying)  2  -SD     Toileting (which includes using toilet bed pan or urinal)  2  -SD     Putting on and taking off regular upper body clothing  2  -SD     Taking care of personal grooming (such as brushing teeth)  2  -SD     Eating meals  2  -SD     AM-PAC 6 Clicks Score (OT)  12  -SD     Row Name 11/06/20 1428          Functional Assessment    Outcome Measure Options  AM-PAC 6 Clicks Daily Activity (OT)  -SD       User Key  (r) = Recorded By, (t) = Taken By, (c) = Cosigned By    Initials Name Provider Type    Elvia Garcia OT Occupational Therapist        Occupational Therapy Education                 Title: PT OT SLP Therapies (In Progress)     Topic: Occupational Therapy (In Progress)     Point: ADL training (Done)     Description:   Instruct learner(s) on  proper safety adaptation and remediation techniques during self care or transfers.   Instruct in proper use of assistive devices.              Learning Progress Summary           Patient Acceptance, E,TB, VU by SD at 11/6/2020 3801    Comment: Benefit of OT; OT POC                   Point: Home exercise program (Not Started)     Description:   Instruct learner(s) on appropriate technique for monitoring, assisting and/or progressing therapeutic exercises/activities.              Learner Progress:  Not documented in this visit.          Point: Precautions (Not Started)     Description:   Instruct learner(s) on prescribed precautions during self-care and functional transfers.              Learner Progress:  Not documented in this visit.          Point: Body mechanics (Not Started)     Description:   Instruct learner(s) on proper positioning and spine alignment during self-care, functional mobility activities and/or exercises.              Learner Progress:  Not documented in this visit.                      User Key     Initials Effective Dates Name Provider Type Discipline    SD 03/07/18 -  Elvia Red OT Occupational Therapist OT              OT Recommendation and Plan  Planned Therapy Interventions (OT): activity tolerance training, adaptive equipment training, BADL retraining, patient/caregiver education/training, strengthening exercise, transfer/mobility retraining  Therapy Frequency (OT): 3 times/wk(5 times if indicated)  Plan of Care Review  Plan of Care Reviewed With: patient  Progress: no change  Outcome Summary: OT eval completed. Patient presents deficits in strength, endurance, balance, mobility, and ADL performance. Patient is expected to benefit from continued OT services prior to DC.     Time Calculation:   Time Calculation- OT     Row Name 11/06/20 1429             Time Calculation- OT    OT Start Time  1132  -SD      OT Received On  11/06/20  -SD      OT Goal Re-Cert Due Date  11/16/20  -SD         User Key  (r) = Recorded By, (t) = Taken By, (c) = Cosigned By    Initials Name Provider Type    Elvia Garcia OT Occupational Therapist        Therapy Charges for Today     Code Description Service Date Service Provider Modifiers Qty    [id number]  OT EVAL MOD COMPLEXITY 3 11/6/2020 Elvia Red OT GO 1               Elvia Red OT  11/6/2020

## 2020-11-06 NOTE — PROGRESS NOTES
Adult Nutrition  Assessment/PES    Patient Name:  Noe Martel  YOB: 1935  MRN: [id number]  Admit Date:  10/23/2020    Assessment Date:  11/6/2020    Comments:    Recommend:  1. Continue current diet order as medically appropriate and tolerated.  2. Encourage PO intake. Average intake ~12% x 2 meals.   3. RD ordered Boost Pudding TID and Magic Cup BID.  4. Continue a multivitamin with minerals daily.     RD to follow pt and available PRN.      Reason for Assessment     Row Name 11/06/20 1021          Reason for Assessment    Reason For Assessment  follow-up protocol     Diagnosis  cardiac disease;infection/sepsis;pulmonary disease;renal disease CAD, COPD, HTN, HLD, GERD, Sepsis, COVID, PNA, [place]     Identified At Risk by Screening Criteria  large or nonhealing wound, burn or pressure injury;difficulty chewing/swallowing           Anthropometrics     Row Name 11/06/20 0600 11/06/20 0400       Anthropometrics    Weight  78.4 kg (172 lb 14.4 oz)  78.4 kg (172 lb 14.4 oz)        Labs/Tests/Procedures/Meds     Row Name 11/06/20 1021          Labs/Procedures/Meds    Lab Results Reviewed  reviewed, pertinent     Lab Results Comments  Low: Alb, Platelets; High: Na, Cl, BUN, HgbA1c, Cr        Medications    Pertinent Medications Reviewed  reviewed, pertinent     Pertinent Medications Comments  ferrous sulfate, Novolog, multivitamin w/ minerals, Protonix, Prostat, Dextrose         Physical Findings     Row Name 11/06/20 1023          Physical Findings    Skin  pressure injury;other (see comments) Bilateral gluteal and right heel pressure injuries, MASD           Nutrition Prescription Ordered     Row Name 11/06/20 1023          Nutrition Prescription PO    Current PO Diet  Dysphagia     Dysphagia Level  2  Pureed     Fluid Consistency  Nectar/syrup thick     Supplement  Boost Pudding (Ensure Pudding);Magic Cup;ProStat     Common Modifiers  Cardiac         Evaluation of Received Nutrient/Fluid Intake     Row  Name 11/06/20 1023          PO Evaluation    Number of Days PO Intake Evaluated  1 day     Number of Meals  2     % PO Intake  12               Problem/Interventions:  Problem 1     Row Name 11/06/20 1024          Nutrition Diagnoses Problem 1    Problem 1  Increased Nutrient Needs     Macronutrient  Kcal;Fluid;Protein     Etiology (related to)  Medical Diagnosis     Infectious Disease  Sepsis     Pulmonary/Critical Care  Pneumonia;COPD;Other (comment) COVID     Skin  Pressure injury     Signs/Symptoms (evidenced by)  Report/Observation     Reported/Observed By  MD         Problem 2     Row Name 11/06/20 1024          Nutrition Diagnoses Problem 2    Problem 2  Altered Nutrition Related to Labs     Etiology (related to)  Medical Diagnosis     Endocrine  Hyperglycemia     Renal  SANG     Signs/Symptoms (evidenced by)  Biochemical     Specific Labs Noted  BUN;Creatinine;HgbA1C         Problem 3     Row Name 11/06/20 1024          Nutrition Diagnoses Problem 3    Problem 3  Inadequate Intake/Infusion     Inadequate Intake Type  Oral     Macronutrient  Kcal;Fat;Fluid;Fiber;Carbohydrate;Protein     Micronutrient  Vitamin;Mineral     Etiology (related to)  MNT for Treatment/Condition     Signs/Symptoms (evidenced by)  PO Intake     Percent (%) intake recorded  12 %     Over number of meals  2     Resolved?  -- Diet order advanced           Intervention Goal     Row Name 11/06/20 1024          Intervention Goal    General  Improved nutrition related lab(s);Meet nutritional needs for age/condition     PO  Meet estimated needs;Increase intake;PO intake (%)     PO Intake %  50 %     Weight  Maintain weight         Nutrition Intervention     Row Name 11/06/20 1024          Nutrition Intervention    RD/Tech Action  Follow Tx progress;Encourage intake;Recommend/ordered     Recommended/Ordered  Supplement         Nutrition Prescription     Row Name 11/06/20 1025          Nutrition Prescription PO    PO Prescription  Begin/change  supplement     Supplement  Boost Pudding;Magic Cup     Supplement Frequency  2 times a day;3 times a day     New PO Prescription Ordered?  Yes        Other Orders    Obtain Weight  Daily     Obtain Weight Ordered?  No, recommended     Supplement  Vitamin mineral supplement Continue     Supplement Ordered?  Yes         Education/Evaluation     Row Name 11/06/20 1026          Education    Education  Education not appropriate at this time     Please explain  Defer until post ICU;Other (comment) isolation statis        Monitor/Evaluation    Monitor  Per protocol;PO intake;Supplement intake;I&O;Pertinent labs;Weight;Skin status           Electronically signed by:  Julia Saxena RD  11/06/20 10:27 EST

## 2020-11-06 NOTE — THERAPY TREATMENT NOTE
Patient Name: Noe Martel  : 1935    MRN: [id number]                              Today's Date: 2020       Admit Date: 10/23/2020    Visit Dx:     ICD-10-CM ICD-9-CM   1. COVID-19  U07.1 079.89   2. Pneumonia of both lungs due to infectious organism, unspecified part of lung  J18.9 483.8   3. Acute on chronic respiratory failure with hypoxia (CMS/[facility])  J96.21 518.84     799.02   4. Acute renal failure, unspecified acute renal failure type (CMS/[facility])  N17.9 584.9   5. Transaminitis  R74.01 790.4   6. Acute respiratory failure with hypoxia (CMS/[facility])  J96.01 518.81   7. Chronic obstructive pulmonary disease, unspecified COPD type (CMS/[facility])  J44.9 496     Patient Active Problem List   Diagnosis   • Benign prostatic hyperplasia   • Hypertension   • CAD (coronary artery disease)   • Dyslipidemia   • Chronic back pain   • GERD (gastroesophageal reflux disease)   • COPD (chronic obstructive pulmonary disease) (CMS/[facility])   • COVID-19   • Acute respiratory failure with hypoxia (CMS/[facility])   • Sepsis, unspecified organism (CMS/[facility])     Past Medical History:   Diagnosis Date   • BPH (benign prostatic hyperplasia)    • CAD (coronary artery disease)     status post drug-eluting stent deployment in the ostium of the right coronary artery and balloon angioplasty of the obtuse marginal branch of the left circumflex, 2015.  Ejection fraction 60%.   • Chronic back pain    • COPD (chronic obstructive pulmonary disease) (CMS/[facility])    • Dyslipidemia      untreated.    • GERD (gastroesophageal reflux disease)    • Hyperlipidemia    • Hypertension    • Impaired functional mobility, balance, gait, and endurance      Past Surgical History:   Procedure Laterality Date   • CARDIAC SURGERY      STENT PLACEMENT   • CHOLECYSTECTOMY     • HERNIA REPAIR      bilateral     General Information     Row Name 20 1131          Physical Therapy Time and Intention    Document Type  therapy note (daily note)  -     Mode of  Treatment  physical therapy  -LM     Row Name 11/06/20 1131          General Information    Existing Precautions/Restrictions  fall  -LM     Barriers to Rehab  cognitive status;previous functional deficit  -LM     Row Name 11/06/20 1131          Safety Issues, Functional Mobility    Safety Issues Affecting Function (Mobility)  insight into deficits/self-awareness;safety precaution awareness;safety precautions follow-through/compliance;sequencing abilities  -LM     Impairments Affecting Function (Mobility)  balance;cognition;endurance/activity tolerance;strength  -LM     Cognitive Impairments, Mobility Safety/Performance  insight into deficits/self-awareness;judgment;safety precaution awareness;safety precaution follow-through  -LM       User Key  (r) = Recorded By, (t) = Taken By, (c) = Cosigned By    Initials Name Provider Type    LM Jeni Gracia PT Physical Therapist        Mobility     Row Name 11/06/20 1131          Bed Mobility    Bed Mobility  supine-sit;sit-supine  -LM     Supine-Sit [place] (Bed Mobility)  minimum assist (75% patient effort);2 person assist  -LM     Sit-Supine [place] (Bed Mobility)  minimum assist (75% patient effort);2 person assist  -LM     Assistive Device (Bed Mobility)  bed rails;head of bed elevated  -LM     Comment (Bed Mobility)  Patient tolerated sitting in bedside chair x 20 minutes.  -LM     Row Name 11/06/20 1131          Bed-Chair Transfer    Bed-Chair [place] (Transfers)  moderate assist (50% patient effort);2 person assist;verbal cues  -LM     Assistive Device (Bed-Chair Transfers)  other (see comments) HHA x 2  -LM     Row Name 11/06/20 1131          Sit-Stand Transfer    Sit-Stand [place] (Transfers)  verbal cues;moderate assist (50% patient effort);2 person assist  -LM     Assistive Device (Sit-Stand Transfers)  other (see comments) HHA x 2  -LM     Row Name 11/06/20 1131          Gait/Stairs (Locomotion)    [place] Level (Gait)  verbal  cues;moderate assist (50% patient effort);2 person assist  -LM     Assistive Device (Gait)  other (see comments) HHA x 2  -LM     Distance in Feet (Gait)  2'x2  -LM     Deviations/Abnormal Patterns (Gait)  base of support, narrow;lon decreased;festinating/shuffling;gait speed decreased;stride length decreased  -LM     Bilateral Gait Deviations  forward flexed posture;heel strike decreased;weight shift ability decreased  -LM       User Key  (r) = Recorded By, (t) = Taken By, (c) = Cosigned By    Initials Name Provider Type    LM Jeni Gracia, EDDIE Physical Therapist        Obj/Interventions     Row Name 11/06/20 1131          Balance    Balance Assessment  sitting static balance;sitting dynamic balance;standing static balance;standing dynamic balance  -LM     Static Sitting Balance  WFL;unsupported;sitting, edge of bed  -LM     Dynamic Sitting Balance  WFL;unsupported;sitting, edge of bed  -LM     Static Standing Balance  moderate impairment;supported  -LM     Dynamic Standing Balance  moderate impairment;supported  -LM       User Key  (r) = Recorded By, (t) = Taken By, (c) = Cosigned By    Initials Name Provider Type    LM Jeni Gracia, PT Physical Therapist        Goals/Plan    No documentation.       Clinical Impression     Row Name 11/06/20 1131          Pain    Additional Documentation  Pain Scale: Numbers Pre/Post-Treatment (Group)  -     Row Name 11/06/20 1131          Pain Scale: Numbers Pre/Post-Treatment    Pretreatment Pain Rating  0/10 - no pain  -LM     Posttreatment Pain Rating  0/10 - no pain  -     Row Name 11/06/20 1131          Plan of Care Review    Plan of Care Reviewed With  patient  -LM     Progress  improving  -     Outcome Summary  PT tx completed. Patient with markedly improved cognitive status, interaction and participation with PT. Able to hold a conversation with therapist and follow commands consistently. Min A x 2 for bed mobility, and mod A x 2 for sit to stand and to ambulate  2'x2 with HHA of 2. Patient sat in bedside chair x 20 minutes for feeding/hygiene activities. Cont to goals.  -LM     Row Name 11/06/20 1131          Therapy Assessment/Plan (PT)    Rehab Potential (PT)  good, to achieve stated therapy goals  -LM     Criteria for Skilled Interventions Met (PT)  yes;skilled treatment is necessary  -LM     Row Name 11/06/20 1131          Vital Signs    O2 Delivery Pre Treatment  room air  -LM     O2 Delivery Intra Treatment  room air  -LM     O2 Delivery Post Treatment  room air  -LM     Row Name 11/06/20 1131          Positioning and Restraints    Pre-Treatment Position  in bed  -LM     Post Treatment Position  bed  -LM     In Bed  notified nsg;fowlers;call light within reach;encouraged to call for assist  -LM       User Key  (r) = Recorded By, (t) = Taken By, (c) = Cosigned By    Initials Name Provider Type    Jeni Ventura, PT Physical Therapist        Outcome Measures     Row Name 11/06/20 1131          How much help from another person do you currently need...    Turning from your back to your side while in flat bed without using bedrails?  2  -LM     Moving from lying on back to sitting on the side of a flat bed without bedrails?  2  -LM     Moving to and from a bed to a chair (including a wheelchair)?  2  -LM     Standing up from a chair using your arms (e.g., wheelchair, bedside chair)?  2  -LM     Climbing 3-5 steps with a railing?  1  -LM     To walk in hospital room?  2  -LM     AM-PAC 6 Clicks Score (PT)  11  -LM       User Key  (r) = Recorded By, (t) = Taken By, (c) = Cosigned By    Initials Name Provider Type    Jeni Ventura, PT Physical Therapist        Physical Therapy Education                 Title: PT OT SLP Therapies (Done)     Topic: Physical Therapy (Done)     Point: Mobility training (Done)     Learning Progress Summary           Patient Acceptance, E,TB, VU by KATHY at 11/6/2020 3438    Comment: Purpose of PT/POC; importance of consistent activity to  improve functional status.    Acceptance, E,TB, VU by LM at 11/5/2020 1503    Comment: Purpose of PT.    Acceptance, E,TB, VU by LM at 11/2/2020 1534    Comment: Ther ex ; purpose of PT.    Acceptance, D,E, DU,NR by TW at 10/31/2020 1022    Comment: Pt education for LE ther ex technique with pt needing visual cues to perform correctly. Pt education also with rolling walker for transfer technique    Acceptance, E,TB, VU by LM at 10/29/2020 1514    Comment: Purpose of PT; ther ex for HEP.    Acceptance, E,TB, VU by LM at 10/28/2020 1600    Comment: Purpose of PT/POC.                   Point: Home exercise program (Done)     Learning Progress Summary           Patient Acceptance, E,TB, VU by LM at 11/6/2020 1339    Comment: Purpose of PT/POC; importance of consistent activity to improve functional status.    Acceptance, E,TB, VU by LM at 11/5/2020 1503    Comment: Purpose of PT.    Acceptance, E,TB, VU by LM at 11/2/2020 1534    Comment: Ther ex ; purpose of PT.    Acceptance, E,D, DU,NR by TW at 11/1/2020 1115    Comment: Pt education on LE ther ex technique. Pt falling asleep during session and will need reinforcement of ther ex.    Acceptance, D,E, DU,NR by TW at 10/31/2020 1022    Comment: Pt education for LE ther ex technique with pt needing visual cues to perform correctly. Pt education also with rolling walker for transfer technique    Acceptance, E,TB, VU by LM at 10/29/2020 1514    Comment: Purpose of PT; ther ex for HEP.    Acceptance, E,TB, VU by LM at 10/28/2020 1600    Comment: Purpose of PT/POC.                   Point: Precautions (Done)     Learning Progress Summary           Patient Acceptance, E,TB, VU by LM at 11/6/2020 1339    Comment: Purpose of PT/POC; importance of consistent activity to improve functional status.    Acceptance, E,TB, VU by LM at 11/5/2020 1503    Comment: Purpose of PT.    Acceptance, E,TB, VU by LM at 11/2/2020 1534    Comment: Ther ex ; purpose of PT.    Acceptance, E,TB, VU  by  at 10/29/2020 1514    Comment: Purpose of PT; ther ex for HEP.    Acceptance, E,TB, VU by  at 10/28/2020 1600    Comment: Purpose of PT/POC.                               User Key     Initials Effective Dates Name Provider Type Discipline     04/03/18 -  Jeni Gracia, PT Physical Therapist PT    TW 03/26/19 -  Angela Mcelroy, PT Physical Therapist PT              PT Recommendation and Plan  Planned Therapy Interventions (PT): balance training, bed mobility training, gait training, transfer training, home exercise program, patient/family education, strengthening  Plan of Care Reviewed With: patient  Progress: improving  Outcome Summary: PT tx completed. Patient with markedly improved cognitive status, interaction and participation with PT. Able to hold a conversation with therapist and follow commands consistently. Min A x 2 for bed mobility, and mod A x 2 for sit to stand and to ambulate 2'x2 with HHA of 2. Patient sat in bedside chair x 20 minutes for feeding/hygiene activities. Cont to goals.     Time Calculation:   PT Charges     Row Name 11/06/20 1342             Time Calculation    Start Time  1131  -      PT Received On  11/06/20  -         Time Calculation- PT    Total Timed Code Minutes- PT  42 minute(s)  -        User Key  (r) = Recorded By, (t) = Taken By, (c) = Cosigned By    Initials Name Provider Type     Jeni Gracia, PT Physical Therapist        Therapy Charges for Today     Code Description Service Date Service Provider Modifiers Qty    [id number]  PT RE-EVAL ESTABLISHED PLAN 2 11/5/2020 Jeni Gracia, PT GP 1    [id number]  PT THERAPEUTIC ACT EA 15 MIN 11/6/2020 Jeni Gracia, PT GP 3          PT G-Codes  Outcome Measure Options: AM-PAC 6 Clicks Basic Mobility (PT)  AM-PAC 6 Clicks Score (PT): 11    Jeni Gracia PT  11/6/2020

## 2020-11-06 NOTE — PROGRESS NOTES
LOS: 14 days   Patient Care Team:  Devika Foy APRN as PCP - General (Family Medicine)    Chief Complaint:  anemia    Subjective     Interval History:   The patient's chart was reviewed in depth, and the patient's history was discussed with the bedside nurse, and the attending hospitalist, as the patient is unable to provide any additional history at this time.  Since I last saw him, he has improved and been moved out of the ICU to the telemetry floor.  His hemodynamics have remained stable.  He is tolerating his diet although his p.o. intake of solids is low.  Per the nursing staff he has had no additional episodes of dark stools or rectal bleeding.  He has had a single smear of stool on his adult diaper that was described as green in color.  He initially received 2 units of packed red blood cells when he was transferred to the ICU early on Wednesday morning.  He is receiving an additional unit of packed red blood cells today for hemoglobin of 7.7.  He has also been started on iron supplement.    He otherwise seems to be improving, and is more interactive and awake, although he is still very confused.      Review of Systems:   Review of systems could not be obtained due to  patient confusion.    Objective     Vital Signs  Temp:  [97.5 °F (36.4 °C)-98.5 °F (36.9 °C)] 98.2 °F (36.8 °C)  Heart Rate:  [] 85  Resp:  [13-24] 16  BP: ()/(51-95) 138/81    Physical Exam:  No exam performed today     Results Review:    I reviewed the patient's new clinical results.    Results from last 7 days   Lab Units 11/06/20  0430 11/05/20  0452 11/04/20  0033  10/31/20  0745   SODIUM mmol/L 159* 153* 148*   < > 138   POTASSIUM mmol/L 3.9 3.8 4.6   < > 4.3   CHLORIDE mmol/L 132* 127* 116*   < > 106   CO2 mmol/L 20.2* 17.0* 18.0*   < > 23.2   BUN mg/dL 46* 75* 115*   < > 24*   CREATININE mg/dL 1.63* 2.04* 2.42*   < > 1.06   CALCIUM mg/dL 6.8* 6.7* 6.8*   < > 7.4*   BILIRUBIN mg/dL  --  0.6 0.4  --  0.7   ALK PHOS U/L   --  58 42  --  46   ALT (SGPT) U/L  --  19 20  --  36   AST (SGOT) U/L  --  16 18  --  33   GLUCOSE mg/dL 111* 139* 150*   < > 94    < > = values in this interval not displayed.       Results from last 7 days   Lab Units 11/06/20  0430 11/05/20  0452 11/04/20  1202 11/04/20  0033   WBC 10*3/mm3 14.55* 27.32*  --  36.85*   HEMOGLOBIN g/dL 7.7* 8.9* 10.4* 8.0*   HEMATOCRIT % 24.6* 28.2* 31.1* 24.8*   PLATELETS 10*3/mm3 134* 188  --  208         Medication Review:     Scheduled Meds:aspirin, 81 mg, Oral, Daily  budesonide-formoterol, 2 puff, Inhalation, BID - RT  ferrous sulfate, 300 mg, Oral, BID  insulin aspart, 0-7 Units, Subcutaneous, TID AC  multivitamin with minerals, 1 tablet, Oral, Daily  nystatin, 5 mL, Oral, 4x Daily  pantoprazole, 40 mg, Intravenous, Q12H  piperacillin-tazobactam, 3.375 g, Intravenous, Q8H  PRO-STAT, 30 mL, Oral, BID  QUEtiapine, 25 mg, Oral, Nightly  sodium chloride, 10 mL, Intravenous, Q12H      Continuous Infusions:dextrose, 100 mL/hr, Last Rate: 100 mL/hr (11/06/20 1104)      PRN Meds:.•  albuterol sulfate HFA  •  ALPRAZolam  •  dextrose  •  dextrose  •  glucagon (human recombinant)  •  metoprolol tartrate  •  sodium chloride      Assessment/Plan       Acute respiratory failure with hypoxia (CMS/HCC)    Hypertension    CAD (coronary artery disease)    GERD (gastroesophageal reflux disease)    COVID-19    Sepsis, unspecified organism (CMS/[facility])      Mr. Martel is an 85-year-old gentleman who was initially admitted on 10/23/2020 with acute hypoxic respiratory failure due to COVID-19 pneumonia, which was bilateral.  He went on to develop acute renal failure, septic shock due to suspected bilateral pyelonephritis plus Covid pneumonia, and acute blood loss anemia with concern for upper GI bleeding.  He is continuing to improve with regards to his sepsis, and remains on broad-spectrum IV antibiotics with Zosyn.  He has had no further episodes of bleeding clinically, but his hemoglobin does  remain low.  He is getting an additional unit of packed red blood cells today, and has been started on iron supplement.  Iron studies, folate, and B12 levels are pending.  Based on his overall clinical status, and the fact that he has not had any additional episodes of overt clinical bleeding, I would not recommend proceeding with endoscopic procedures at this time given the risk posed in the setting of his bilateral Covid related pneumonia, and recent sepsis.  Likely, the risk of these procedures at this point outweighs the benefit as he has remained stable.  Certainly, once he has recovered from this event, and discharged home, it could be considered on an outpatient basis to proceed with endoscopic evaluation of his anemia.  Additionally, if he were to develop obvious GI hemorrhage, either myself or one of my partners is always available to reevaluate the patient.  For now, I would defer the remainder of his care to the hospitalist service, and surgery will sign off.  Please feel free to have the patient follow-up with me as an outpatient if the family desires.    I had a detailed discussion with the patient's son and wife via telephone regarding the updates in his clinical condition, and my recommendations above regarding endoscopy.  They are agreeable to holding off for now.  However, should he develop overt GI hemorrhage, both the son and the wife do wish to proceed with endoscopic evaluation.  At the conclusion of my conversation with the patient's family, they verbalized understanding of my recommendations and were satisfied with the plan of care.    These recommendations were also discussed with the hospitalist service and the patient's nurse.    Mayte Hernandez MD  11/06/20  16:33 EST

## 2020-11-06 NOTE — PROGRESS NOTES
[facility]    Name:  Noe Martel   Age:  85 y.o.  Sex:  male  :  1935  MRN:  [id number]   Visit Number:  [id number]  Admission Date:  10/23/2020  Date Of Service:  20  Primary Care Physician:  Devika Foy APRN     LOS: 14 days :  Patient Care Team:  Devika Foy APRN as PCP - General (Family Medicine):    Chief Complaint:      Generalized weakness and confusion.    Subjective:     Mr. Martel was seen and examined this morning.  He continues to do well and answering questions appropriately.  Does get confused at times and picking on things.  He has been off Levophed since yesterday and his blood pressure remained stable.  He has been eating some but according to the nurse only drinks liquids mostly and does not drink and does not eat solids.  No significant overnight events.    Hospital course:    Mr. Martel is an 85-year-old male with history of coronary artery disease, COPD, hypertension was brought to the emergency room by his family on 10/23/2020 with progressive weakness and decreased word output.  Patient's son reportedly has been sick and was diagnosed with Covid recently.  Patient has been feeling weak and has not been walking for a few days.  He was noted to have hypoxia in the emergency room at 68% and was placed on nonrebreather mask followed by BiPAP therapy.  His COVID-19 test was positive.  Patient was admitted to the ICU and was placed on IV antibiotics therapy with Rocephin and Levaquin as well as IV dexamethasone.  He was started on remdesivir.  Due to his renal failure, nephrology was consulted.  Patient was placed on IV fluids for dehydration and renal failure.  His initial CT of the head was negative for acute intracranial abnormalities but showed chronic ischemic microvascular disease.  Patient has not been mostly non-verbal throughout the hospital stay but was able to eat at least 50% of his meals later in the hospital  course.    Once the renal function improved, he did complete the course of remdesivir.  He also completed a course of Rocephin and Levaquin.  He was transferred out of ICU to the Covid floor.  He was started on physical and Occupational Therapy due to significant generalized weakness.  Case management was consulted for possible rehab placement but the family refused rehabilitation.  Patient was planned for discharge on 11/3/2020 with home health services but could not be discharged that day due to inability to arrange home health.  Patient continued to develop hypotension the next day and was noted to have worsening renal failure.  He was also noted to have dark-colored stools and diarrhea.  He had to be transferred back to the ICU and was suspected of septic shock possibly secondary to urinary tract infection.  He was also thought to have GI bleeding and was transfused with 2 units of packed red blood cells.  He was placed on IV Protonix.  Dr. Hernandez was consulted from surgical services.  Patient had to be placed on Levophed drip to maintain blood pressures and a right femoral central venous line was placed.  He was placed on broad-spectrum IV antibiotics therapy with Zosyn and vancomycin.  Patient improved over the next 48 hours with regards to his hypotension.  He was able to verbalize and became more alert with antibiotics therapy and Levophed drip.  His vancomycin therapy was subsequently discontinued.  He was started on puréed diet with nectar thick liquids as per speech recommendations.  He was started on physical and Occupational Therapy.    Review of Systems:     Does not seem to be in any distress.  Denies any chest pain or shortness of breath.  I am unable to obtain complete review of systems due to his confusion.    Vital Signs:    Temp:  [97.5 °F (36.4 °C)-97.9 °F (36.6 °C)] 97.5 °F (36.4 °C)  Heart Rate:  [] 109  Resp:  [13-24] 20  BP: ()/() 145/87    Intake and output:    I/O last 3  completed shifts:  In: 4959 [P.O.:270; I.V.:4689]  Out: 1000 [Urine:1000]  I/O this shift:  In: 238 [P.O.:238]  Out: -     Physical Examination:    General Appearance:  Alert and oriented to person, does not seem to be in any acute distress.  Does get fidgety occasionally.   Head:  Atraumatic and normocephalic, without obvious abnormality.   Eyes:          PERRLA, conjunctivae and sclerae normal, no Icterus. No pallor.   Neck: Supple, trachea midline, no thyromegaly, no carotid bruit.   Lungs:   Chest shape is normal. Breath sounds heard bilaterally equally.  No wheezing.  Occasional basal crackles heard.  No pleural rub or bronchial breathing.   Heart:  Normal S1 and S2, no murmur, no gallop, no rub. No JVD   Abdomen:   Normal bowel sounds, no masses, no organomegaly. Soft, nontender, nondistended, no guarding, no rebound.  Condom catheter is in place.  Tenderness. Ecchymotic patches noted on the abdominal wall.   Extremities: Moves all extremities, no edema, no cyanosis, no clubbing.  Right femoral central venous line noted.   Skin: No bleeding. Skin excoriation noted in the right hip area.  No erythema.   Neurologic: Alert on but confused at times.  Answers a few questions appropriately. Moves all 4 extremities but does have generalized weakness.     Laboratory results:    Results from last 7 days   Lab Units 11/06/20  0430 11/05/20  0452 11/04/20  0033  10/31/20  0745   SODIUM mmol/L 159* 153* 148*   < > 138   POTASSIUM mmol/L 3.9 3.8 4.6   < > 4.3   CHLORIDE mmol/L 132* 127* 116*   < > 106   CO2 mmol/L 20.2* 17.0* 18.0*   < > 23.2   BUN mg/dL 46* 75* 115*   < > 24*   CREATININE mg/dL 1.63* 2.04* 2.42*   < > 1.06   CALCIUM mg/dL 6.8* 6.7* 6.8*   < > 7.4*   BILIRUBIN mg/dL  --  0.6 0.4  --  0.7   ALK PHOS U/L  --  58 42  --  46   ALT (SGPT) U/L  --  19 20  --  36   AST (SGOT) U/L  --  16 18  --  33   GLUCOSE mg/dL 111* 139* 150*   < > 94    < > = values in this interval not displayed.     Results from last 7 days    Lab Units 11/06/20  0430 11/05/20  0452 11/04/20  1202 11/04/20  0033   WBC 10*3/mm3 14.55* 27.32*  --  36.85*   HEMOGLOBIN g/dL 7.7* 8.9* 10.4* 8.0*   HEMATOCRIT % 24.6* 28.2* 31.1* 24.8*   PLATELETS 10*3/mm3 134* 188  --  208         Results from last 7 days   Lab Units 11/05/20  0452 11/04/20  0033   CK TOTAL U/L  --  245*   TROPONIN T ng/mL 0.038*  --      Results from last 7 days   Lab Units 11/04/20  0830 11/04/20  0823   BLOODCX  No growth at 2 days No growth at 2 days     Results from last 7 days   Lab Units 11/04/20  0337   PH, ARTERIAL pH units 7.463   PO2 ART mm Hg 61.8*   PCO2, ARTERIAL mm Hg 22.1*   HCO3 ART mmol/L 15.8*     I have reviewed the patient's laboratory results.    Radiology results:    Imaging Results (Last 24 Hours)     ** No results found for the last 24 hours. **        Medication Review:     I have reviewed the patient's active and prn medications.       Acute respiratory failure with hypoxia (CMS/[facility])    Hypertension    CAD (coronary artery disease)    GERD (gastroesophageal reflux disease)    COVID-19    Sepsis, unspecified organism (CMS/[facility])    Assessment:    1.  Acute hypoxic respiratory failure secondary to #2, present on admission.  2.  Patchy bilateral COVID-19 pneumonia, present on admission, improved.  3.  Acute renal failure, present on admission, not related to sepsis, resolved.  4.  Sepsis secondary to #2, present on admission, resolved.  5.  Septic shock developed on 11/3/2020, not present on admission, resolved.  6.  Suspected bilateral pyelonephritis, improved.  7.  Acute blood loss anemia likely secondary to upper GI bleeding, status post 2 units PRBC.  8.  Essential hypertension.  9.  Coronary artery disease.  10.  Gastroesophageal reflux disease.  11.  Suspected underlying dementia.    Plan:    Mr. Martel continues to improve with regards to his sepsis.  He is maintaining his blood pressures.  He will be continued on IV antibiotics therapy with Zosyn.  He will be  continued on physical and Occupational Therapy.    His hemoglobin is low this morning and I will transfuse him with 1 more unit of blood.  I will also place him on iron supplements.  We will get iron panel, folate and vitamin B12 levels.    He will be continued on Seroquel at night for agitation.  He will be transferred out to the regular medical floor.  I have discussed the patient's condition with case management and they are working on getting him to a rehab facility.  Discussed with nursing staff and multidisciplinary team.      Sushil Shane MD  11/06/20  13:22 EST    Dictated utilizing Dragon dictation.

## 2020-11-06 NOTE — PLAN OF CARE
Problem: Adult Inpatient Plan of Care  Goal: Plan of Care Review  Recent Flowsheet Documentation  Taken 11/6/2020 1131 by Jeni Gracia, PT  Progress: improving  Plan of Care Reviewed With: patient  Outcome Summary: PT tx completed. Patient with markedly improved cognitive status, interaction and participation with PT. Able to hold a conversation with therapist and follow commands consistently. Min A x 2 for bed mobility, and mod A x 2 for sit to stand and to ambulate 2'x2 with HHA of 2. Patient sat in bedside chair x 20 minutes for feeding/hygiene activities. Cont to goals.

## 2020-11-06 NOTE — PLAN OF CARE
Goal Outcome Evaluation:  Plan of Care Reviewed With: patient  Progress: no change  Outcome Summary: VSS.  1 unit PRBC infusing.  No c/o pain.  Resting well.

## 2020-11-06 NOTE — PROGRESS NOTES
Received update from Dr. Shane that pt was doing better medically.  He reports that pt's family would like to take patient home.  If rehab is able to accept, he agrees with plan for rehab.  Palliative will continue to follow.

## 2020-11-06 NOTE — PROGRESS NOTES
Continued Stay Note   [facility]     Patient Name: Noe Martel  MRN: [id number]  Today's Date: 11/6/2020    Admit Date: 10/23/2020    Discharge Plan     Row Name 11/06/20 0933       Plan    Plan  Follow up with family to discuss rehab process. Referred to [facility].        Discharge Codes    No documentation.       Expected Discharge Date and Time     Expected Discharge Date Expected Discharge Time    Nov 7, 2020             Anne Siddiqi LCSW

## 2020-11-07 LAB
ANION GAP SERPL CALCULATED.3IONS-SCNC: 8 MMOL/L (ref 5–15)
BH BB BLOOD EXPIRATION DATE: NORMAL
BH BB BLOOD TYPE BARCODE: 5100
BH BB DISPENSE STATUS: NORMAL
BH BB PRODUCT CODE: NORMAL
BH BB UNIT NUMBER: NORMAL
BUN SERPL-MCNC: 32 MG/DL (ref 8–23)
BUN/CREAT SERPL: 19.4 (ref 7–25)
CALCIUM SPEC-SCNC: 7 MG/DL (ref 8.6–10.5)
CHLORIDE SERPL-SCNC: 126 MMOL/L (ref 98–107)
CO2 SERPL-SCNC: 21 MMOL/L (ref 22–29)
CREAT SERPL-MCNC: 1.65 MG/DL (ref 0.76–1.27)
CROSSMATCH INTERPRETATION: NORMAL
DEPRECATED RDW RBC AUTO: 59.5 FL (ref 37–54)
ERYTHROCYTE [DISTWIDTH] IN BLOOD BY AUTOMATED COUNT: 17.9 % (ref 12.3–15.4)
FOLATE SERPL-MCNC: 2.8 NG/ML (ref 4.78–24.2)
GFR SERPL CREATININE-BSD FRML MDRD: 40 ML/MIN/1.73
GLUCOSE BLDC GLUCOMTR-MCNC: 109 MG/DL (ref 70–130)
GLUCOSE BLDC GLUCOMTR-MCNC: 92 MG/DL (ref 70–130)
GLUCOSE SERPL-MCNC: 96 MG/DL (ref 65–99)
HCT VFR BLD AUTO: 28.3 % (ref 37.5–51)
HGB BLD-MCNC: 8.9 G/DL (ref 13–17.7)
MCH RBC QN AUTO: 29.4 PG (ref 26.6–33)
MCHC RBC AUTO-ENTMCNC: 31.4 G/DL (ref 31.5–35.7)
MCV RBC AUTO: 93.4 FL (ref 79–97)
PLATELET # BLD AUTO: 107 10*3/MM3 (ref 140–450)
PMV BLD AUTO: 11.3 FL (ref 6–12)
POTASSIUM SERPL-SCNC: 3.2 MMOL/L (ref 3.5–5.2)
RBC # BLD AUTO: 3.03 10*6/MM3 (ref 4.14–5.8)
SODIUM SERPL-SCNC: 155 MMOL/L (ref 136–145)
UNIT  ABO: NORMAL
UNIT  RH: NORMAL
VIT B12 BLD-MCNC: 1527 PG/ML (ref 211–946)
WBC # BLD AUTO: 11.12 10*3/MM3 (ref 3.4–10.8)

## 2020-11-07 PROCEDURE — 99232 SBSQ HOSP IP/OBS MODERATE 35: CPT | Performed by: INTERNAL MEDICINE

## 2020-11-07 PROCEDURE — 97530 THERAPEUTIC ACTIVITIES: CPT

## 2020-11-07 PROCEDURE — 25010000002 MORPHINE SULFATE (PF) 2 MG/ML SOLUTION: Performed by: INTERNAL MEDICINE

## 2020-11-07 PROCEDURE — 82962 GLUCOSE BLOOD TEST: CPT

## 2020-11-07 PROCEDURE — 97110 THERAPEUTIC EXERCISES: CPT

## 2020-11-07 PROCEDURE — 82746 ASSAY OF FOLIC ACID SERUM: CPT | Performed by: INTERNAL MEDICINE

## 2020-11-07 PROCEDURE — 25010000002 PIPERACILLIN SOD-TAZOBACTAM PER 1 G: Performed by: INTERNAL MEDICINE

## 2020-11-07 PROCEDURE — 85027 COMPLETE CBC AUTOMATED: CPT | Performed by: INTERNAL MEDICINE

## 2020-11-07 PROCEDURE — 80048 BASIC METABOLIC PNL TOTAL CA: CPT | Performed by: INTERNAL MEDICINE

## 2020-11-07 PROCEDURE — 82607 VITAMIN B-12: CPT | Performed by: INTERNAL MEDICINE

## 2020-11-07 PROCEDURE — 25010000002 LORAZEPAM PER 2 MG: Performed by: INTERNAL MEDICINE

## 2020-11-07 RX ORDER — MORPHINE SULFATE 2 MG/ML
1 INJECTION, SOLUTION INTRAMUSCULAR; INTRAVENOUS EVERY 4 HOURS PRN
Status: DISCONTINUED | OUTPATIENT
Start: 2020-11-07 | End: 2020-11-12 | Stop reason: HOSPADM

## 2020-11-07 RX ORDER — LORAZEPAM 2 MG/ML
0.5 INJECTION INTRAMUSCULAR EVERY 4 HOURS PRN
Status: DISCONTINUED | OUTPATIENT
Start: 2020-11-07 | End: 2020-11-09

## 2020-11-07 RX ORDER — FOLIC ACID 1 MG/1
1 TABLET ORAL DAILY
Status: DISCONTINUED | OUTPATIENT
Start: 2020-11-07 | End: 2020-11-12 | Stop reason: HOSPADM

## 2020-11-07 RX ORDER — AMOXICILLIN AND CLAVULANATE POTASSIUM 500; 125 MG/1; MG/1
1 TABLET, FILM COATED ORAL EVERY 12 HOURS SCHEDULED
Status: DISPENSED | OUTPATIENT
Start: 2020-11-07 | End: 2020-11-10

## 2020-11-07 RX ORDER — DEXTROSE MONOHYDRATE 50 MG/ML
100 INJECTION, SOLUTION INTRAVENOUS CONTINUOUS
Status: ACTIVE | OUTPATIENT
Start: 2020-11-07 | End: 2020-11-08

## 2020-11-07 RX ADMIN — AMOXICILLIN AND CLAVULANATE POTASSIUM 500 MG: 500; 125 TABLET, FILM COATED ORAL at 15:28

## 2020-11-07 RX ADMIN — PANTOPRAZOLE SODIUM 40 MG: 40 INJECTION, POWDER, FOR SOLUTION INTRAVENOUS at 08:50

## 2020-11-07 RX ADMIN — SODIUM CHLORIDE, PRESERVATIVE FREE 10 ML: 5 INJECTION INTRAVENOUS at 08:50

## 2020-11-07 RX ADMIN — LORAZEPAM 0.5 MG: 2 INJECTION, SOLUTION INTRAMUSCULAR; INTRAVENOUS at 21:22

## 2020-11-07 RX ADMIN — MINERAL SUPPLEMENT IRON 300 MG / 5 ML STRENGTH LIQUID 100 PER BOX UNFLAVORED 300 MG: at 08:50

## 2020-11-07 RX ADMIN — BUDESONIDE AND FORMOTEROL FUMARATE DIHYDRATE 2 PUFF: 160; 4.5 AEROSOL RESPIRATORY (INHALATION) at 08:30

## 2020-11-07 RX ADMIN — TAZOBACTAM SODIUM AND PIPERACILLIN SODIUM 3.38 G: 375; 3 INJECTION, SOLUTION INTRAVENOUS at 08:50

## 2020-11-07 RX ADMIN — ASPIRIN 81 MG CHEWABLE TABLET 81 MG: 81 TABLET CHEWABLE at 08:51

## 2020-11-07 RX ADMIN — FOLIC ACID 1 MG: 1 TABLET ORAL at 15:28

## 2020-11-07 RX ADMIN — ALPRAZOLAM 0.5 MG: 0.5 TABLET ORAL at 13:08

## 2020-11-07 RX ADMIN — DEXTROSE MONOHYDRATE 100 ML/HR: 50 INJECTION, SOLUTION INTRAVENOUS at 15:28

## 2020-11-07 RX ADMIN — PANTOPRAZOLE SODIUM 40 MG: 40 INJECTION, POWDER, FOR SOLUTION INTRAVENOUS at 21:21

## 2020-11-07 RX ADMIN — MORPHINE SULFATE 1 MG: 2 INJECTION, SOLUTION INTRAMUSCULAR; INTRAVENOUS at 21:22

## 2020-11-07 RX ADMIN — LORAZEPAM 0.5 MG: 2 INJECTION, SOLUTION INTRAMUSCULAR; INTRAVENOUS at 16:14

## 2020-11-07 RX ADMIN — MULTIPLE VITAMINS W/ MINERALS TAB 1 TABLET: TAB at 08:50

## 2020-11-07 RX ADMIN — MORPHINE SULFATE 1 MG: 2 INJECTION, SOLUTION INTRAMUSCULAR; INTRAVENOUS at 16:15

## 2020-11-07 RX ADMIN — Medication 30 ML: at 08:51

## 2020-11-07 NOTE — PLAN OF CARE
Goal Outcome Evaluation:  Plan of Care Reviewed With: patient  Progress: no change       Patient is sleeping now for the first time today after a small dose of morphine and ativan. He has been restless and said he was in pain this afternoon. Is holding pureed foods in his mouth.

## 2020-11-07 NOTE — THERAPY TREATMENT NOTE
Patient Name: Noe Martel  : 1935    MRN: [id number]                              Today's Date: 2020       Admit Date: 10/23/2020    Visit Dx:     ICD-10-CM ICD-9-CM   1. COVID-19  U07.1 079.89   2. Pneumonia of both lungs due to infectious organism, unspecified part of lung  J18.9 483.8   3. Acute on chronic respiratory failure with hypoxia (CMS/[facility])  J96.21 518.84     799.02   4. Acute renal failure, unspecified acute renal failure type (CMS/[facility])  N17.9 584.9   5. Transaminitis  R74.01 790.4   6. Acute respiratory failure with hypoxia (CMS/[facility])  J96.01 518.81   7. Chronic obstructive pulmonary disease, unspecified COPD type (CMS/[facility])  J44.9 496     Patient Active Problem List   Diagnosis   • Benign prostatic hyperplasia   • Hypertension   • CAD (coronary artery disease)   • Dyslipidemia   • Chronic back pain   • GERD (gastroesophageal reflux disease)   • COPD (chronic obstructive pulmonary disease) (CMS/[facility])   • COVID-19   • Acute respiratory failure with hypoxia (CMS/[facility])   • Sepsis, unspecified organism (CMS/[facility])     Past Medical History:   Diagnosis Date   • BPH (benign prostatic hyperplasia)    • CAD (coronary artery disease)     status post drug-eluting stent deployment in the ostium of the right coronary artery and balloon angioplasty of the obtuse marginal branch of the left circumflex, 2015.  Ejection fraction 60%.   • Chronic back pain    • COPD (chronic obstructive pulmonary disease) (CMS/[facility])    • Dyslipidemia      untreated.    • GERD (gastroesophageal reflux disease)    • Hyperlipidemia    • Hypertension    • Impaired functional mobility, balance, gait, and endurance      Past Surgical History:   Procedure Laterality Date   • CARDIAC SURGERY      STENT PLACEMENT   • CHOLECYSTECTOMY     • HERNIA REPAIR      bilateral     General Information     Row Name 20 1211          Physical Therapy Time and Intention    Document Type  therapy note (daily note)  -TW     Mode of  Treatment  physical therapy  -TW     Row Name 11/07/20 1211          General Information    Patient Profile Reviewed  yes  -TW     Existing Precautions/Restrictions  fall  -TW     Row Name 11/07/20 1211          Cognition    Orientation Status (Cognition)  oriented to;person  -TW     Row Name 11/07/20 1211          Safety Issues, Functional Mobility    Safety Issues Affecting Function (Mobility)  at risk behavior observed;awareness of need for assistance;insight into deficits/self-awareness;safety precaution awareness;safety precautions follow-through/compliance;friction/shear risk  -TW     Impairments Affecting Function (Mobility)  balance;endurance/activity tolerance;cognition;strength;pain  -TW     Cognitive Impairments, Mobility Safety/Performance  attention;awareness, need for assistance;insight into deficits/self-awareness;problem-solving/reasoning;safety precaution awareness;safety precaution follow-through  -TW     Comment, Safety Issues/Impairments (Mobility)  Pt very restless during PT treatment this date and it was difficult for pt to stay on task.  -TW       User Key  (r) = Recorded By, (t) = Taken By, (c) = Cosigned By    Initials Name Provider Type    TW Angela Mcelroy, PT Physical Therapist        Mobility     Row Name 11/07/20 1211          Bed Mobility    Bed Mobility  sit-supine;supine-sit;scooting/bridging  -TW     Scooting/Bridging [place] (Bed Mobility)  maximum assist (25% patient effort);verbal cues;nonverbal cues (demo/gesture)  -TW     Supine-Sit [place] (Bed Mobility)  moderate assist (50% patient effort);verbal cues;nonverbal cues (demo/gesture)  -TW     Sit-Supine [place] (Bed Mobility)  moderate assist (50% patient effort);verbal cues;nonverbal cues (demo/gesture)  -TW     Assistive Device (Bed Mobility)  bed rails;head of bed elevated;draw sheet  -TW     Comment (Bed Mobility)  Pt continued to be restlesss sitting on EOB and  -TW     Row Name 11/07/20 1211           Transfers    Comment (Transfers)  Pt able to stand for 15 seconds with rolling walker  -[facility] Name 11/07/20 1211          Sit-Stand Transfer    Sit-Stand [place] (Transfers)  maximum assist (25% patient effort);verbal cues;nonverbal cues (demo/gesture)  -TW     Assistive Device (Sit-Stand Transfers)  walker, front-wheeled  -     Row Name 11/07/20 1211          Gait/Stairs (Locomotion)    [place] Level (Gait)  unable to assess  -TW       User Key  (r) = Recorded By, (t) = Taken By, (c) = Cosigned By    Initials Name Provider Type    TW Angela Mcelroy, PT Physical Therapist        Obj/Interventions     [facility] Name 11/07/20 1211          Motor Skills    Therapeutic Exercise  -- AAROM for heelslides and hip ab/add and SAQ x 10. Pt not able to follow commands for isometrics  -[facility] Name 11/07/20 1211          Balance    Static Sitting Balance  WFL;unsupported  -TW     Dynamic Sitting Balance  unsupported;WFL But due to restlessness safety SBA required.  -TW     Static Standing Balance  supported;severe impairment  -TW     Dynamic Standing Balance  severe impairment;supported  -TW     Balance Interventions  standing;sit to stand  -TW     Comment, Balance  Pt not able to stand more than 15 sec with walker and max assist, wt posterior and assist neede to correct to midline.  -TW       User Key  (r) = Recorded By, (t) = Taken By, (c) = Cosigned By    Initials Name Provider Type    TW Angela Mcelroy, PT Physical Therapist        Goals/Plan    No documentation.       Clinical Impression     [facility] Name 11/07/20 1211          Pain    Additional Documentation  Pain Scale: FACES Pre/Post-Treatment (Group)  -[facility] Name 11/07/20 1211          Pain Scale: FACES Pre/Post-Treatment    Pain: FACES Scale, Pretreatment  2-->hurts little bit  -TW     Posttreatment Pain Rating  4-->hurts little more  -TW     Pain Location - Side  Bilateral  -TW     Pain Location - Orientation  anterior  -TW     Pain Location  abdomen   -TW     Row Name 11/07/20 1211          Plan of Care Review    Plan of Care Reviewed With  patient  -TW     Outcome Summary  PT treatment completed this am with pt having difficulty staying on task and increased restlessness noted. Pt had c/o in abdominal area post sitting on EOB x 10 min. Pt came to stand with max assist with rolling walker for 15 sec. LE ther ex per flowsheet, again with pt having trouble focusing on task. Will continue current PT POC.  -TW     Row Name 11/07/20 1211          Vital Signs    Pretreatment Heart Rate (beats/min)  94  -TW     Intratreatment Heart Rate (beats/min)  124  -TW     Posttreatment Heart Rate (beats/min)  98  -TW     Pre SpO2 (%)  94  -TW     O2 Delivery Pre Treatment  room air  -TW     Post SpO2 (%)  94  -TW     O2 Delivery Post Treatment  room air  -TW     Pre Patient Position  Supine  -TW     Intra Patient Position  Sitting  -TW     Post Patient Position  Supine  -TW     Row Name 11/07/20 1211          Positioning and Restraints    Pre-Treatment Position  in bed  -TW     Post Treatment Position  bed  -TW     In Bed  fowlers;exit alarm on;call light within reach;encouraged to call for assist  -TW       User Key  (r) = Recorded By, (t) = Taken By, (c) = Cosigned By    Initials Name Provider Type    TW Angela Mcelroy, EDDIE Physical Therapist        Outcome Measures     Row Name 11/07/20 1211          How much help from another person do you currently need...    Turning from your back to your side while in flat bed without using bedrails?  2  -TW     Moving from lying on back to sitting on the side of a flat bed without bedrails?  2  -TW     Moving to and from a bed to a chair (including a wheelchair)?  2  -TW     Standing up from a chair using your arms (e.g., wheelchair, bedside chair)?  2  -TW     Climbing 3-5 steps with a railing?  1  -TW     To walk in hospital room?  1  -TW     AM-PAC 6 Clicks Score (PT)  10  -TW       User Key  (r) = Recorded By, (t) = Taken By, (c) =  Cosigned By    Initials Name Provider Type    Angela Lynn PT Physical Therapist        Physical Therapy Education                 Title: PT OT SLP Therapies (In Progress)     Topic: Physical Therapy (In Progress)     Point: Mobility training (In Progress)     Learning Progress Summary           Patient Acceptance, E,D, NR by TW at 11/7/2020 1211    Comment: Pt education on LE ther ex technique and safety and technique with using rolling walker. Pt had difficulty focusing and will need reinforcement.    Acceptance, E,TB, VU by  at 11/6/2020 1339    Comment: Purpose of PT/POC; importance of consistent activity to improve functional status.    Acceptance, E,TB, VU by  at 11/5/2020 1503    Comment: Purpose of PT.    Acceptance, E,TB, VU by  at 11/2/2020 1534    Comment: Ther ex ; purpose of PT.    Acceptance, D,E, DU,NR by TW at 10/31/2020 1022    Comment: Pt education for LE ther ex technique with pt needing visual cues to perform correctly. Pt education also with rolling walker for transfer technique    Acceptance, E,TB, VU by  at 10/29/2020 1514    Comment: Purpose of PT; ther ex for HEP.    Acceptance, E,TB, VU by  at 10/28/2020 1600    Comment: Purpose of PT/POC.                   Point: Home exercise program (In Progress)     Learning Progress Summary           Patient Acceptance, E,D, NR by  at 11/7/2020 1211    Comment: Pt education on LE ther ex technique and safety and technique with using rolling walker. Pt had difficulty focusing and will need reinforcement.    Acceptance, E,TB, VU by  at 11/6/2020 1339    Comment: Purpose of PT/POC; importance of consistent activity to improve functional status.    Acceptance, E,TB, VU by  at 11/5/2020 1503    Comment: Purpose of PT.    Acceptance, E,TB, VU by  at 11/2/2020 1534    Comment: Ther ex ; purpose of PT.    Acceptance, E,D, DU,NR by TW at 11/1/2020 1115    Comment: Pt education on LE ther ex technique. Pt falling asleep during session and  will need reinforcement of ther ex.    Acceptance, D,E, DU,NR by  at 10/31/2020 1022    Comment: Pt education for LE ther ex technique with pt needing visual cues to perform correctly. Pt education also with rolling walker for transfer technique    Acceptance, E,TB, VU by  at 10/29/2020 1514    Comment: Purpose of PT; ther ex for HEP.    Acceptance, E,TB, VU by  at 10/28/2020 1600    Comment: Purpose of PT/POC.                   Point: Precautions (Done)     Learning Progress Summary           Patient Acceptance, E,TB, VU by  at 11/6/2020 1339    Comment: Purpose of PT/POC; importance of consistent activity to improve functional status.    Acceptance, E,TB, VU by  at 11/5/2020 1503    Comment: Purpose of PT.    Acceptance, E,TB, VU by  at 11/2/2020 1534    Comment: Ther ex ; purpose of PT.    Acceptance, E,TB, VU by  at 10/29/2020 1514    Comment: Purpose of PT; ther ex for HEP.    Acceptance, E,TB, VU by  at 10/28/2020 1600    Comment: Purpose of PT/POC.                               User Key     Initials Effective Dates Name Provider Type Discipline     04/03/18 -  Jeni Gracia, PT Physical Therapist PT     03/26/19 -  Angela Mcelroy, PT Physical Therapist PT              PT Recommendation and Plan     Plan of Care Reviewed With: patient  Progress: no change  Outcome Summary: PT treatment completed this am with pt having difficulty staying on task and increased restlessness noted. Pt had c/o in abdominal area post sitting on EOB x 10 min. Pt came to stand with max assist with rolling walker for 15 sec. LE ther ex per flowsheet, again with pt having trouble focusing on task. Will continue current PT POC.     Time Calculation:   PT Charges     Row Name 11/07/20 1211             Time Calculation    Start Time  1145  -TW      Stop Time  1211  -TW      Time Calculation (min)  26 min  -TW      PT Received On  11/07/20  -TW         Timed Charges    11354 - PT Therapeutic Exercise Minutes  10  -TW       28013 - PT Therapeutic Activity Minutes  16  -TW        User Key  (r) = Recorded By, (t) = Taken By, (c) = Cosigned By    Initials Name Provider Type    TW Angela Mcelroy PT Physical Therapist        Therapy Charges for Today     Code Description Service Date Service Provider Modifiers Qty    72416023230  PT THER PROC EA 15 MIN 11/7/2020 Angela Mcelroy, PT GP 1    [id number]  PT THERAPEUTIC ACT EA 15 MIN 11/7/2020 Angela Mcelroy, PT GP 1          PT G-Codes  Outcome Measure Options: AM-PAC 6 Clicks Daily Activity (OT)  AM-PAC 6 Clicks Score (PT): 10  AM-PAC 6 Clicks Score (OT): 12    Angela Mcelroy PT  11/7/2020

## 2020-11-07 NOTE — PLAN OF CARE
Problem: Adult Inpatient Plan of Care  Goal: Plan of Care Review  Recent Flowsheet Documentation  Taken 11/7/2020 1211 by Angela Mcelroy, PT  Plan of Care Reviewed With: patient  Outcome Summary: PT treatment completed this am with pt having difficulty staying on task and increased restlessness noted. Pt had c/o in abdominal area post sitting on EOB x 10 min. Pt came to stand with max assist with rolling walker for 15 sec. LE ther ex per flowsheet, again with pt having trouble focusing on task. Will continue current PT POC.

## 2020-11-07 NOTE — PROGRESS NOTES
[facility]    Name:  Noe Martel   Age:  85 y.o.  Sex:  male  :  1935  MRN:  [id number]   Visit Number:  [id number]  Admission Date:  10/23/2020  Date Of Service:  20  Primary Care Physician:  Devika Foy APRN     LOS: 15 days :  Patient Care Team:  Devika Foy APRN as PCP - General (Family Medicine):    Chief Complaint:      Generalized weakness and confusion.    Subjective:     Mr. Martel was seen and examined this morning.  He is lying down on the bed and is comfortable at rest.  He was transferred out of the ICU yesterday.  Denies any chest pain.  No fevers.  He was reevaluated by Dr. Hernandez from surgical services and she did not feel that the patient would be at candidate for endoscopy/colonoscopy due to his multiple comorbidities.  He did receive 1 more unit of blood transfusion yesterday with appropriate improvement in his hemoglobin.  No significant overnight events.    Hospital course:    Mr. Martel is an 85-year-old male with history of coronary artery disease, COPD, hypertension was brought to the emergency room by his family on 10/23/2020 with progressive weakness and decreased word output.  Patient's son reportedly has been sick and was diagnosed with Covid recently.  Patient has been feeling weak and has not been walking for a few days.  He was noted to have hypoxia in the emergency room at 68% and was placed on nonrebreather mask followed by BiPAP therapy.  His COVID-19 test was positive.  Patient was admitted to the ICU and was placed on IV antibiotics therapy with Rocephin and Levaquin as well as IV dexamethasone.  He was started on remdesivir.  Due to his renal failure, nephrology was consulted.  Patient was placed on IV fluids for dehydration and renal failure.  His initial CT of the head was negative for acute intracranial abnormalities but showed chronic ischemic microvascular disease.  Patient has not been mostly non-verbal  throughout the hospital stay but was able to eat at least 50% of his meals later in the hospital course.    Once the renal function improved, he did complete the course of remdesivir.  He also completed a course of Rocephin and Levaquin.  He was transferred out of ICU to the [facility] floor.  He was started on physical and Occupational Therapy due to significant generalized weakness.  Case management was consulted for possible rehab placement but the family refused rehabilitation.  Patient was planned for discharge on 11/3/2020 with home health services but could not be discharged that day due to inability to arrange home health.  Patient continued to develop hypotension the next day and was noted to have worsening renal failure.  He was also noted to have dark-colored stools and diarrhea.  He had to be transferred back to the ICU and was suspected of septic shock possibly secondary to urinary tract infection.  He was also thought to have GI bleeding and was transfused with 2 units of packed red blood cells.  He was placed on IV Protonix.  Dr. Hernandez was consulted from surgical services.  Patient had to be placed on Levophed drip to maintain blood pressures and a right femoral central venous line was placed.  He was placed on broad-spectrum IV antibiotics therapy with Zosyn and vancomycin.  Patient improved over the next 48 hours with regards to his hypotension.  He was able to verbalize and became more alert with antibiotics therapy and Levophed drip.  His vancomycin therapy was subsequently discontinued.  He was started on puréed diet with nectar thick liquids as per speech recommendations.  He was started on physical and Occupational Therapy.  He received 1 more unit of packed red blood cells on 11/6/2020.  He was seen by Dr. Hernandez who did not feel that the patient would be a candidate for endoscopy/colonoscopy due to his multiple comorbidities.  He was transferred out of ICU on 11/6/2020.    Review of Systems:      Does not seem to be in any distress.  Denies any chest pain or shortness of breath.  I am unable to obtain complete review of systems due to his confusion.    Vital Signs:    Temp:  [95.9 °F (35.5 °C)-98.5 °F (36.9 °C)] 98 °F (36.7 °C)  Heart Rate:  [70-94] 84  Resp:  [16-20] 18  BP: (115-155)/(57-98) 143/84    Intake and output:    I/O last 3 completed shifts:  In: 1993 [P.O.:388; I.V.:1327; Blood:278]  Out: -   I/O this shift:  In: 75 [P.O.:75]  Out: -     Physical Examination:    General Appearance:  Alert and oriented to person, does not seem to be in any acute distress.  Does get fidgety occasionally.   Head:  Atraumatic and normocephalic, without obvious abnormality.   Eyes:          PERRLA, conjunctivae and sclerae normal, no Icterus. No pallor.   Neck: Supple, trachea midline, no thyromegaly, no carotid bruit.   Lungs:   Chest shape is normal. Breath sounds heard bilaterally equally.  No wheezing.  Occasional basal crackles heard.  No pleural rub or bronchial breathing.   Heart:  Normal S1 and S2, no murmur, no gallop, no rub. No JVD   Abdomen:   Normal bowel sounds, no masses, no organomegaly. Soft, nontender, nondistended, no guarding, no rebound.  Condom catheter is in place.  Tenderness. Ecchymotic patches noted on the abdominal wall.   Extremities: Moves all extremities, no edema, no cyanosis, no clubbing.  Right femoral central venous line noted.   Skin: No bleeding. Skin excoriation noted in the right hip area.  No erythema.   Neurologic: Alert on but confused at times.  Answers a few questions appropriately. Moves all 4 extremities but does have generalized weakness.     Laboratory results:    Results from last 7 days   Lab Units 11/07/20  0640 11/06/20  0430 11/05/20  0452 11/04/20  0033   SODIUM mmol/L 155* 159* 153* 148*   POTASSIUM mmol/L 3.2* 3.9 3.8 4.6   CHLORIDE mmol/L 126* 132* 127* 116*   CO2 mmol/L 21.0* 20.2* 17.0* 18.0*   BUN mg/dL 32* 46* 75* 115*   CREATININE mg/dL 1.65* 1.63*  2.04* 2.42*   CALCIUM mg/dL 7.0* 6.8* 6.7* 6.8*   BILIRUBIN mg/dL  --   --  0.6 0.4   ALK PHOS U/L  --   --  58 42   ALT (SGPT) U/L  --   --  19 20   AST (SGOT) U/L  --   --  16 18   GLUCOSE mg/dL 96 111* 139* 150*     Results from last 7 days   Lab Units 11/07/20  0641 11/06/20  0430 11/05/20  0452   WBC 10*3/mm3 11.12* 14.55* 27.32*   HEMOGLOBIN g/dL 8.9* 7.7* 8.9*   HEMATOCRIT % 28.3* 24.6* 28.2*   PLATELETS 10*3/mm3 107* 134* 188         Results from last 7 days   Lab Units 11/05/20  0452 11/04/20  0033   CK TOTAL U/L  --  245*   TROPONIN T ng/mL 0.038*  --      Results from last 7 days   Lab Units 11/04/20  0830 11/04/20  0823   BLOODCX  No growth at 3 days No growth at 3 days     Results from last 7 days   Lab Units 11/04/20  0337   PH, ARTERIAL pH units 7.463   PO2 ART mm Hg 61.8*   PCO2, ARTERIAL mm Hg 22.1*   HCO3 ART mmol/L 15.8*     I have reviewed the patient's laboratory results.    Radiology results:    Imaging Results (Last 24 Hours)     ** No results found for the last 24 hours. **        Medication Review:     I have reviewed the patient's active and prn medications.       Acute respiratory failure with hypoxia (CMS/[facility])    Hypertension    CAD (coronary artery disease)    GERD (gastroesophageal reflux disease)    COVID-19    Sepsis, unspecified organism (CMS/[facility])    Assessment:    1.  Acute hypoxic respiratory failure secondary to #2, present on admission.  2.  Patchy bilateral COVID-19 pneumonia, present on admission, improved.  3.  Acute renal failure, present on admission, not related to sepsis, resolved.  4.  Sepsis secondary to #2, present on admission, resolved.  5.  Septic shock developed on 11/3/2020, not present on admission, resolved.  6.  Suspected bilateral pyelonephritis, improved.  7.  Acute blood loss anemia likely secondary to upper GI bleeding, status post 3 units PRBC.  8.  Essential hypertension.  9.  Coronary artery disease.  10.  Gastroesophageal reflux disease.  11.  Suspected  underlying dementia.    Plan:    Mr. Martel continues to improve with regards to his sepsis.  He did receive 1 unit of blood transfusion yesterday with improvement in his hemoglobin.  His iron levels were low.  He also has low levels of folic acid.  I will start him on folic acid and ferrous sulfate supplements.  His vitamin B12 level is elevated.    His hypernatremia is improving with IV hydration.  His renal function is stable.    He is currently on IV antibiotics therapy with Zosyn and I will switch him over to Augmentin today.  He will be continued on physical therapy.  He is on Seroquel at night for sleep and agitation.  Case management is currently working to get him to short-term rehabilitation.  Discussed with nursing staff.      Sushil Shane MD  11/07/20  13:51 EST    Dictated utilizing Dragon dictation.

## 2020-11-08 LAB
ANION GAP SERPL CALCULATED.3IONS-SCNC: 7 MMOL/L (ref 5–15)
BUN SERPL-MCNC: 22 MG/DL (ref 8–23)
BUN/CREAT SERPL: 16.1 (ref 7–25)
CALCIUM SPEC-SCNC: 6.9 MG/DL (ref 8.6–10.5)
CHLORIDE SERPL-SCNC: 122 MMOL/L (ref 98–107)
CO2 SERPL-SCNC: 22 MMOL/L (ref 22–29)
CREAT SERPL-MCNC: 1.37 MG/DL (ref 0.76–1.27)
DEPRECATED RDW RBC AUTO: 56.5 FL (ref 37–54)
ERYTHROCYTE [DISTWIDTH] IN BLOOD BY AUTOMATED COUNT: 17.4 % (ref 12.3–15.4)
GFR SERPL CREATININE-BSD FRML MDRD: 50 ML/MIN/1.73
GLUCOSE BLDC GLUCOMTR-MCNC: 113 MG/DL (ref 70–130)
GLUCOSE BLDC GLUCOMTR-MCNC: 129 MG/DL (ref 70–130)
GLUCOSE BLDC GLUCOMTR-MCNC: 83 MG/DL (ref 70–130)
GLUCOSE BLDC GLUCOMTR-MCNC: 97 MG/DL (ref 70–130)
GLUCOSE SERPL-MCNC: 85 MG/DL (ref 65–99)
HCT VFR BLD AUTO: 26.9 % (ref 37.5–51)
HGB BLD-MCNC: 8.9 G/DL (ref 13–17.7)
MCH RBC QN AUTO: 30.1 PG (ref 26.6–33)
MCHC RBC AUTO-ENTMCNC: 33.1 G/DL (ref 31.5–35.7)
MCV RBC AUTO: 90.9 FL (ref 79–97)
PLATELET # BLD AUTO: 93 10*3/MM3 (ref 140–450)
PMV BLD AUTO: 11 FL (ref 6–12)
POTASSIUM SERPL-SCNC: 3.4 MMOL/L (ref 3.5–5.2)
RBC # BLD AUTO: 2.96 10*6/MM3 (ref 4.14–5.8)
SODIUM SERPL-SCNC: 151 MMOL/L (ref 136–145)
WBC # BLD AUTO: 9.71 10*3/MM3 (ref 3.4–10.8)

## 2020-11-08 PROCEDURE — 99232 SBSQ HOSP IP/OBS MODERATE 35: CPT | Performed by: INTERNAL MEDICINE

## 2020-11-08 PROCEDURE — 25010000002 MORPHINE SULFATE (PF) 2 MG/ML SOLUTION: Performed by: INTERNAL MEDICINE

## 2020-11-08 PROCEDURE — 97530 THERAPEUTIC ACTIVITIES: CPT

## 2020-11-08 PROCEDURE — 80048 BASIC METABOLIC PNL TOTAL CA: CPT | Performed by: INTERNAL MEDICINE

## 2020-11-08 PROCEDURE — 25010000002 LORAZEPAM PER 2 MG: Performed by: INTERNAL MEDICINE

## 2020-11-08 PROCEDURE — 85027 COMPLETE CBC AUTOMATED: CPT | Performed by: INTERNAL MEDICINE

## 2020-11-08 PROCEDURE — 82962 GLUCOSE BLOOD TEST: CPT

## 2020-11-08 RX ORDER — DEXTROSE MONOHYDRATE 50 MG/ML
100 INJECTION, SOLUTION INTRAVENOUS CONTINUOUS
Status: ACTIVE | OUTPATIENT
Start: 2020-11-08 | End: 2020-11-08

## 2020-11-08 RX ADMIN — Medication 30 ML: at 21:48

## 2020-11-08 RX ADMIN — FOLIC ACID 1 MG: 1 TABLET ORAL at 10:15

## 2020-11-08 RX ADMIN — MINERAL SUPPLEMENT IRON 300 MG / 5 ML STRENGTH LIQUID 100 PER BOX UNFLAVORED 300 MG: at 10:15

## 2020-11-08 RX ADMIN — BUDESONIDE AND FORMOTEROL FUMARATE DIHYDRATE 2 PUFF: 160; 4.5 AEROSOL RESPIRATORY (INHALATION) at 09:45

## 2020-11-08 RX ADMIN — ALBUTEROL SULFATE 2 PUFF: 90 AEROSOL, METERED RESPIRATORY (INHALATION) at 09:45

## 2020-11-08 RX ADMIN — MORPHINE SULFATE 1 MG: 2 INJECTION, SOLUTION INTRAMUSCULAR; INTRAVENOUS at 01:39

## 2020-11-08 RX ADMIN — Medication 30 ML: at 10:15

## 2020-11-08 RX ADMIN — AMOXICILLIN AND CLAVULANATE POTASSIUM 500 MG: 500; 125 TABLET, FILM COATED ORAL at 10:15

## 2020-11-08 RX ADMIN — QUETIAPINE FUMARATE 25 MG: 25 TABLET ORAL at 21:49

## 2020-11-08 RX ADMIN — SODIUM CHLORIDE, PRESERVATIVE FREE 10 ML: 5 INJECTION INTRAVENOUS at 10:15

## 2020-11-08 RX ADMIN — MINERAL SUPPLEMENT IRON 300 MG / 5 ML STRENGTH LIQUID 100 PER BOX UNFLAVORED 300 MG: at 21:48

## 2020-11-08 RX ADMIN — AMOXICILLIN AND CLAVULANATE POTASSIUM 500 MG: 500; 125 TABLET, FILM COATED ORAL at 21:48

## 2020-11-08 RX ADMIN — PANTOPRAZOLE SODIUM 40 MG: 40 INJECTION, POWDER, FOR SOLUTION INTRAVENOUS at 10:15

## 2020-11-08 RX ADMIN — DEXTROSE MONOHYDRATE 100 ML/HR: 50 INJECTION, SOLUTION INTRAVENOUS at 11:40

## 2020-11-08 RX ADMIN — SODIUM CHLORIDE, PRESERVATIVE FREE 10 ML: 5 INJECTION INTRAVENOUS at 21:49

## 2020-11-08 RX ADMIN — LORAZEPAM 0.5 MG: 2 INJECTION, SOLUTION INTRAMUSCULAR; INTRAVENOUS at 01:39

## 2020-11-08 RX ADMIN — PANTOPRAZOLE SODIUM 40 MG: 40 INJECTION, POWDER, FOR SOLUTION INTRAVENOUS at 21:49

## 2020-11-08 RX ADMIN — BUDESONIDE AND FORMOTEROL FUMARATE DIHYDRATE 2 PUFF: 160; 4.5 AEROSOL RESPIRATORY (INHALATION) at 20:49

## 2020-11-08 RX ADMIN — MULTIPLE VITAMINS W/ MINERALS TAB 1 TABLET: TAB at 10:15

## 2020-11-08 RX ADMIN — ASPIRIN 81 MG CHEWABLE TABLET 81 MG: 81 TABLET CHEWABLE at 10:15

## 2020-11-08 NOTE — PROGRESS NOTES
[facility]    Name:  Noe Martel   Age:  85 y.o.  Sex:  male  :  1935  MRN:  [id number]   Visit Number:  [id number]  Admission Date:  10/23/2020  Date Of Service:  20  Primary Care Physician:  Devika Foy APRN     LOS: 16 days :  Patient Care Team:  Devika Foy APRN as PCP - General (Family Medicine):    Chief Complaint:      Generalized weakness and confusion.    Subjective:     Mr. Martel was seen and examined this morning.  He is lying down on the bed and is comfortable at rest.  Is about the same compared to his mental status.  He was able to answer but still continues to be fidgety at times.  He likely has underlying dementia that has been exacerbated with hospital stay.  Denies any chest pain.  No fevers.  His hemoglobin has remained stable.  No significant overnight events.    Hospital course:    Mr. Martel is an 85-year-old male with history of coronary artery disease, COPD, hypertension was brought to the emergency room by his family on 10/23/2020 with progressive weakness and decreased word output.  Patient's son reportedly has been sick and was diagnosed with Covid recently.  Patient has been feeling weak and has not been walking for a few days.  He was noted to have hypoxia in the emergency room at 68% and was placed on nonrebreather mask followed by BiPAP therapy.  His COVID-19 test was positive.  Patient was admitted to the ICU and was placed on IV antibiotics therapy with Rocephin and Levaquin as well as IV dexamethasone.  He was started on remdesivir.  Due to his renal failure, nephrology was consulted.  Patient was placed on IV fluids for dehydration and renal failure.  His initial CT of the head was negative for acute intracranial abnormalities but showed chronic ischemic microvascular disease.  Patient has not been mostly non-verbal throughout the hospital stay but was able to eat at least 50% of his meals later in the  hospital course.    Once the renal function improved, he did complete the course of remdesivir.  He also completed a course of Rocephin and Levaquin.  He was transferred out of ICU to the [facility] floor.  He was started on physical and Occupational Therapy due to significant generalized weakness.  Case management was consulted for possible rehab placement but the family refused rehabilitation.  Patient was planned for discharge on 11/3/2020 with home health services but could not be discharged that day due to inability to arrange home health.  Patient continued to develop hypotension the next day and was noted to have worsening renal failure.  He was also noted to have dark-colored stools and diarrhea.  He had to be transferred back to the ICU and was suspected of septic shock possibly secondary to urinary tract infection.  He was also thought to have GI bleeding and was transfused with 2 units of packed red blood cells.  He was placed on IV Protonix.  Dr. Hernandez was consulted from surgical services.  Patient had to be placed on Levophed drip to maintain blood pressures and a right femoral central venous line was placed.  He was placed on broad-spectrum IV antibiotics therapy with Zosyn and vancomycin.  Patient improved over the next 48 hours with regards to his hypotension.  He was able to verbalize and became more alert with antibiotics therapy and Levophed drip.  His vancomycin therapy was subsequently discontinued.  He was started on puréed diet with nectar thick liquids as per speech recommendations.  He was started on physical and Occupational Therapy.  He received 1 more unit of packed red blood cells on 11/6/2020.  He was seen by Dr. Hernandez who did not feel that the patient would be a candidate for endoscopy/colonoscopy due to his multiple comorbidities.  He was transferred out of ICU on 11/6/2020.    Review of Systems:     Does not seem to be in any distress.  Denies any chest pain or shortness of breath.  I am  unable to obtain complete review of systems due to his confusion.    Vital Signs:    Temp:  [97.4 °F (36.3 °C)-98.2 °F (36.8 °C)] 97.6 °F (36.4 °C)  Heart Rate:  [84-94] 94  Resp:  [18-20] 20  BP: (148-165)/(85-92) 148/88    Intake and output:    I/O last 3 completed shifts:  In: 1387 [P.O.:225; I.V.:1162]  Out: -   No intake/output data recorded.    Physical Examination:    General Appearance:  Alert and oriented to person, does not seem to be in any acute distress.  Does get fidgety occasionally.   Head:  Atraumatic and normocephalic, without obvious abnormality.   Eyes:          PERRLA, conjunctivae and sclerae normal, no Icterus. No pallor.   Neck: Supple, trachea midline, no thyromegaly, no carotid bruit.   Lungs:   Chest shape is normal. Breath sounds heard bilaterally equally.  No wheezing.  Occasional basal crackles heard.  No pleural rub or bronchial breathing.   Heart:  Normal S1 and S2, no murmur, no gallop, no rub. No JVD   Abdomen:   Normal bowel sounds, no masses, no organomegaly. Soft, nontender, nondistended, no guarding, no rebound.  Condom catheter is in place.  Tenderness. Ecchymotic patches noted on the abdominal wall.   Extremities: Moves all extremities, no edema, no cyanosis, no clubbing.  Right femoral central venous line noted.   Skin: No bleeding. Skin excoriation noted in the right hip area.  No erythema.   Neurologic: Alert on but confused at times.  Answers a few questions appropriately. Moves all 4 extremities but does have generalized weakness.     Laboratory results:    Results from last 7 days   Lab Units 11/08/20  0656 11/07/20  0640 11/06/20  0430 11/05/20  0452 11/04/20  0033   SODIUM mmol/L 151* 155* 159* 153* 148*   POTASSIUM mmol/L 3.4* 3.2* 3.9 3.8 4.6   CHLORIDE mmol/L 122* 126* 132* 127* 116*   CO2 mmol/L 22.0 21.0* 20.2* 17.0* 18.0*   BUN mg/dL 22 32* 46* 75* 115*   CREATININE mg/dL 1.37* 1.65* 1.63* 2.04* 2.42*   CALCIUM mg/dL 6.9* 7.0* 6.8* 6.7* 6.8*   BILIRUBIN mg/dL  --    --   --  0.6 0.4   ALK PHOS U/L  --   --   --  58 42   ALT (SGPT) U/L  --   --   --  19 20   AST (SGOT) U/L  --   --   --  16 18   GLUCOSE mg/dL 85 96 111* 139* 150*     Results from last 7 days   Lab Units 11/08/20  0656 11/07/20  0641 11/06/20  0430   WBC 10*3/mm3 9.71 11.12* 14.55*   HEMOGLOBIN g/dL 8.9* 8.9* 7.7*   HEMATOCRIT % 26.9* 28.3* 24.6*   PLATELETS 10*3/mm3 93* 107* 134*         Results from last 7 days   Lab Units 11/05/20  0452 11/04/20  0033   CK TOTAL U/L  --  245*   TROPONIN T ng/mL 0.038*  --      Results from last 7 days   Lab Units 11/04/20  0830 11/04/20  0823   BLOODCX  No growth at 4 days No growth at 4 days     Results from last 7 days   Lab Units 11/04/20  0337   PH, ARTERIAL pH units 7.463   PO2 ART mm Hg 61.8*   PCO2, ARTERIAL mm Hg 22.1*   HCO3 ART mmol/L 15.8*     I have reviewed the patient's laboratory results.    Radiology results:    Imaging Results (Last 24 Hours)     ** No results found for the last 24 hours. **        Medication Review:     I have reviewed the patient's active and prn medications.       Acute respiratory failure with hypoxia (CMS/[facility])    Hypertension    CAD (coronary artery disease)    GERD (gastroesophageal reflux disease)    COVID-19    Sepsis, unspecified organism (CMS/[facility])    Assessment:    1.  Acute hypoxic respiratory failure secondary to #2, present on admission.  2.  Patchy bilateral COVID-19 pneumonia, present on admission, improved.  3.  Acute renal failure, present on admission, not related to sepsis, resolved.  4.  Sepsis secondary to #2, present on admission, resolved.  5.  Septic shock developed on 11/3/2020, not present on admission, resolved.  6.  Suspected bilateral pyelonephritis, improved.  7.  Acute blood loss anemia likely secondary to upper GI bleeding, status post 3 units PRBC.  8.  Essential hypertension.  9.  Coronary artery disease.  10.  Gastroesophageal reflux disease.  11.  Suspected underlying dementia.    Plan:    Mr. Martel  continues to improve with regards to his sepsis.  He has received a total of 3 units of packed red blood cells during this admission for suspected GI bleed.  Unfortunately due to his multiple comorbidities and Covid infection he was felt not a candidate at this time for GI work-up.    Patient was noted to have folic acid deficiency and has been started on folic acid tablets.  Due to his low iron levels he has also been started on iron syrup.    Patient is currently on Augmentin for urinary tract infection and will be completing the course in the next couple of days.    Patient unfortunately does not eat very well and tends to develop dehydration and hypernatremia.  His sodium levels are currently improving with IV hydration.  He does have severe weakness and would benefit from going to short-term rehabilitation.  He will be continued on physical therapy.  He is on Seroquel at night for sleep and agitation.  Case management is currently working to get him to short-term rehabilitation.  Discussed with nursing staff at the bedside.      Sushil Shane MD  11/08/20  13:13 EST    Dictated utilizing Dragon dictation.

## 2020-11-08 NOTE — PLAN OF CARE
Goal Outcome Evaluation:  Plan of Care Reviewed With: patient  Progress: declining  Outcome Summary: pleasantly confused patient with no signs or symptoms of pain at this time-monitor blood sugar with coverage per protocol-medications given per Dr. Shane's orders-monitor labs and continue to monitor patient-bed alarm on at all times

## 2020-11-08 NOTE — THERAPY TREATMENT NOTE
Patient Name: Noe Martel  : 1935    MRN: [id number]                              Today's Date: 2020       Admit Date: 10/23/2020    Visit Dx:     ICD-10-CM ICD-9-CM   1. COVID-19  U07.1 079.89   2. Pneumonia of both lungs due to infectious organism, unspecified part of lung  J18.9 483.8   3. Acute on chronic respiratory failure with hypoxia (CMS/[facility])  J96.21 518.84     799.02   4. Acute renal failure, unspecified acute renal failure type (CMS/[facility])  N17.9 584.9   5. Transaminitis  R74.01 790.4   6. Acute respiratory failure with hypoxia (CMS/[facility])  J96.01 518.81   7. Chronic obstructive pulmonary disease, unspecified COPD type (CMS/[facility])  J44.9 496     Patient Active Problem List   Diagnosis   • Benign prostatic hyperplasia   • Hypertension   • CAD (coronary artery disease)   • Dyslipidemia   • Chronic back pain   • GERD (gastroesophageal reflux disease)   • COPD (chronic obstructive pulmonary disease) (CMS/[facility])   • COVID-19   • Acute respiratory failure with hypoxia (CMS/[facility])   • Sepsis, unspecified organism (CMS/[facility])     Past Medical History:   Diagnosis Date   • BPH (benign prostatic hyperplasia)    • CAD (coronary artery disease)     status post drug-eluting stent deployment in the ostium of the right coronary artery and balloon angioplasty of the obtuse marginal branch of the left circumflex, 2015.  Ejection fraction 60%.   • Chronic back pain    • COPD (chronic obstructive pulmonary disease) (CMS/[facility])    • Dyslipidemia      untreated.    • GERD (gastroesophageal reflux disease)    • Hyperlipidemia    • Hypertension    • Impaired functional mobility, balance, gait, and endurance      Past Surgical History:   Procedure Laterality Date   • CARDIAC SURGERY      STENT PLACEMENT   • CHOLECYSTECTOMY     • HERNIA REPAIR      bilateral     General Information     Row Name 20 1518          Physical Therapy Time and Intention    Document Type  therapy note (daily note)  -TW     Mode of  Treatment  physical therapy  -TW     Row Name 11/08/20 1518          Cognition    Orientation Status (Cognition)  person  -TW     Row Name 11/08/20 1518          Safety Issues, Functional Mobility    Safety Issues Affecting Function (Mobility)  at risk behavior observed;ability to follow commands;awareness of need for assistance;friction/shear risk;impulsivity;insight into deficits/self-awareness;judgment;problem-solving;safety precaution awareness;safety precautions follow-through/compliance;other (see comments) Pt restless, constantly moving UE's, pulling on IV line, taking off depend and socks and pulling on bed rails trying to sit up.  -TW     Impairments Affecting Function (Mobility)  balance;cognition;endurance/activity tolerance;motor planning;postural/trunk control;strength  -TW     Cognitive Impairments, Mobility Safety/Performance  attention;awareness, need for assistance;insight into deficits/self-awareness;judgment;problem-solving/reasoning;safety precaution awareness;safety precaution follow-through  -TW     Comment, Safety Issues/Impairments (Mobility)  Pt restless with constant pulling on medical lines, clothing or bed rails.  -TW       User Key  (r) = Recorded By, (t) = Taken By, (c) = Cosigned By    Initials Name Provider Type    TW Angela Mcelroy, PT Physical Therapist        Mobility     Row Name 11/08/20 1518          Bed Mobility    Bed Mobility  supine-sit;scooting/bridging;sit-supine  -TW     Scooting/Bridging [place] (Bed Mobility)  maximum assist (25% patient effort);verbal cues;nonverbal cues (demo/gesture) pt does grab rails which interferes with scooting up in bed.  -TW     Supine-Sit [place] (Bed Mobility)  maximum assist (25% patient effort);verbal cues;nonverbal cues (demo/gesture)  -TW     Sit-Supine [place] (Bed Mobility)  verbal cues;nonverbal cues (demo/gesture);maximum assist (25% patient effort)  -TW     Assistive Device (Bed Mobility)  head of bed elevated;draw  sheet  -TW     Comment (Bed Mobility)  Pt was not able to assist with getting LE's back into bed even with extended time allowed for following directions. nor could he control descent of trunk  -TW     Row Name 11/08/20 1518          Transfers    Comment (Transfers)  Pt was too weak to be able to come to stand this date.  -[facility] Name 11/08/20 1518          Sit-Stand Transfer    Sit-Stand [place] (Transfers)  unable to assess  -TW     Row Name 11/08/20 1518          Gait/Stairs (Locomotion)    [place] Level (Gait)  unable to assess  -       User Key  (r) = Recorded By, (t) = Taken By, (c) = Cosigned By    Initials Name Provider Type     Angela Mcelroy, EDDIE Physical Therapist        Obj/Interventions     Row Name 11/08/20 1518          Motor Skills    Therapeutic Exercise  knee;hip B AAROM x 10 including heel slides, hip abd/add which initially calmed pt but then he became more restless and indicated he wanted to sit on EOB.  -TW     Row Name 11/08/20 1518          Balance    Static Sitting Balance  mild impairment;unsupported;sitting, edge of bed  -TW     Dynamic Sitting Balance  mild impairment;unsupported;sitting, edge of bed pt sat on EOB with flexed trunk for 8 min with CGA to min assist.  -TW     Static Standing Balance  unable to perform activity  -TW     Comment, Balance  Pt showed a decline in sitting balance this date while sitting on EOB  -TW       User Key  (r) = Recorded By, (t) = Taken By, (c) = Cosigned By    Initials Name Provider Type     Angela Mcelroy, PT Physical Therapist        Goals/Plan    No documentation.       Clinical Impression     Row Name 11/08/20 1518          Pain Scale: Numbers Pre/Post-Treatment    Pretreatment Pain Rating  0/10 - no pain  -TW     Posttreatment Pain Rating  0/10 - no pain  -TW     Pre/Posttreatment Pain Comment  No c/o pain but pt only reponding to his name today and restless so difficult to assess  -TW     Row Name 11/08/20 1518          Plan of  Care Review    Plan of Care Reviewed With  patient  -TW     Progress  declining  -TW     Outcome Summary  PT treament completed this pm with pt showing a decline in functional strength and sitting balance. Pt very restless and constantly removing clothing and pulling on medical lines. Pt did sit on EOB x 8 with CGA to min assist. Will continue pt's POC monitoring progress.  -TW       User Key  (r) = Recorded By, (t) = Taken By, (c) = Cosigned By    Initials Name Provider Type    Angela Lynn PT Physical Therapist        Outcome Measures     Row Name 11/08/20 1518          How much help from another person do you currently need...    Turning from your back to your side while in flat bed without using bedrails?  2  -TW     Moving from lying on back to sitting on the side of a flat bed without bedrails?  2  -TW     Moving to and from a bed to a chair (including a wheelchair)?  1  -TW     Standing up from a chair using your arms (e.g., wheelchair, bedside chair)?  1  -TW     Climbing 3-5 steps with a railing?  1  -TW     To walk in hospital room?  1  -TW     AM-PAC 6 Clicks Score (PT)  8  -TW       User Key  (r) = Recorded By, (t) = Taken By, (c) = Cosigned By    Initials Name Provider Type    Angela Lynn PT Physical Therapist        Physical Therapy Education                 Title: PT OT SLP Therapies (In Progress)     Topic: Physical Therapy (In Progress)     Point: Mobility training (In Progress)     Learning Progress Summary           Patient Acceptance, E, NR by TW at 11/8/2020 1518    Comment: Readiness is decreased due to pt not seeming to understand cues given to him this date. Pt education on LE ther ex technique and bed mobility.    Acceptance, E,D, NR by TW at 11/7/2020 1211    Comment: Pt education on LE ther ex technique and safety and technique with using rolling walker. Pt had difficulty focusing and will need reinforcement.    Acceptance, E,TB, VU by  at 11/6/2020 1339    Comment: Purpose of  PT/POC; importance of consistent activity to improve functional status.    Acceptance, E,TB, VU by LM at 11/5/2020 1503    Comment: Purpose of PT.    Acceptance, E,TB, VU by LM at 11/2/2020 1534    Comment: Ther ex ; purpose of PT.    Acceptance, D,E, DU,NR by TW at 10/31/2020 1022    Comment: Pt education for LE ther ex technique with pt needing visual cues to perform correctly. Pt education also with rolling walker for transfer technique    Acceptance, E,TB, VU by LM at 10/29/2020 1514    Comment: Purpose of PT; ther ex for HEP.    Acceptance, E,TB, VU by LM at 10/28/2020 1600    Comment: Purpose of PT/POC.                   Point: Home exercise program (In Progress)     Learning Progress Summary           Patient Acceptance, E, NR by TW at 11/8/2020 1518    Comment: Readiness is decreased due to pt not seeming to understand cues given to him this date. Pt education on LE ther ex technique and bed mobility.    Acceptance, E,D, NR by TW at 11/7/2020 1211    Comment: Pt education on LE ther ex technique and safety and technique with using rolling walker. Pt had difficulty focusing and will need reinforcement.    Acceptance, E,TB, VU by  at 11/6/2020 1339    Comment: Purpose of PT/POC; importance of consistent activity to improve functional status.    Acceptance, E,TB, VU by LM at 11/5/2020 1503    Comment: Purpose of PT.    Acceptance, E,TB, VU by LM at 11/2/2020 1534    Comment: Ther ex ; purpose of PT.    Acceptance, E,D, DU,NR by TW at 11/1/2020 1115    Comment: Pt education on LE ther ex technique. Pt falling asleep during session and will need reinforcement of ther ex.    Acceptance, D,E, DU,NR by TW at 10/31/2020 1022    Comment: Pt education for LE ther ex technique with pt needing visual cues to perform correctly. Pt education also with rolling walker for transfer technique    Acceptance, E,TB, VU by LM at 10/29/2020 1514    Comment: Purpose of PT; ther ex for HEP.    Acceptance, E,TB, VU by  at  10/28/2020 1600    Comment: Purpose of PT/POC.                   Point: Precautions (Done)     Learning Progress Summary           Patient Acceptance, E,TB, VU by  at 11/6/2020 1339    Comment: Purpose of PT/POC; importance of consistent activity to improve functional status.    Acceptance, E,TB, VU by  at 11/5/2020 1503    Comment: Purpose of PT.    Acceptance, E,TB, VU by  at 11/2/2020 1534    Comment: Ther ex ; purpose of PT.    Acceptance, E,TB, VU by  at 10/29/2020 1514    Comment: Purpose of PT; ther ex for HEP.    Acceptance, E,TB, VU by  at 10/28/2020 1600    Comment: Purpose of PT/POC.                               User Key     Initials Effective Dates Name Provider Type Discipline     04/03/18 -  Jeni Gracia, PT Physical Therapist PT     03/26/19 -  Angela Mcelroy PT Physical Therapist PT              PT Recommendation and Plan     Plan of Care Reviewed With: patient  Progress: declining  Outcome Summary: PT treament completed this pm with pt showing a decline in functional strength and sitting balance. Pt very restless and constantly removing clothing and pulling on medical lines. Pt did sit on EOB x 8 with CGA to min assist. Will continue pt's POC monitoring progress.     Time Calculation:   PT Charges     Row Name 11/08/20 1518             Time Calculation    Start Time  1458  -TW      Stop Time  1518  -TW      Time Calculation (min)  20 min  -TW      PT Received On  11/08/20  -TW         Timed Charges    87107 - PT Therapeutic Activity Minutes  20  -TW        User Key  (r) = Recorded By, (t) = Taken By, (c) = Cosigned By    Initials Name Provider Type    TW Angela Mcelroy PT Physical Therapist        Therapy Charges for Today     Code Description Service Date Service Provider Modifiers Qty    48666495251 HC PT THER PROC EA 15 MIN 11/7/2020 Angela Mcelroy, PT GP 1    77764916150 HC PT THERAPEUTIC ACT EA 15 MIN 11/7/2020 Angela Mcelroy PT GP 1    35564018581 HC PT THERAPEUTIC ACT EA 15 MIN  11/8/2020 Angela Mcelroy, PT GP 1          PT G-Codes  Outcome Measure Options: AM-PAC 6 Clicks Daily Activity (OT)  AM-PAC 6 Clicks Score (PT): 8  AM-PAC 6 Clicks Score (OT): 12    Angela Mcelroy, PT  11/8/2020

## 2020-11-08 NOTE — PLAN OF CARE
Problem: Adult Inpatient Plan of Care  Goal: Plan of Care Review  Recent Flowsheet Documentation  Taken 11/8/2020 1518 by Angela Mcelroy, PT  Progress: declining  Plan of Care Reviewed With: patient  Outcome Summary: PT treament completed this pm with pt showing a decline in functional strength and sitting balance. Pt very restless and constantly removing clothing and pulling on medical lines. Pt did sit on EOB x 8 with CGA to min assist. Will continue pt's POC monitoring progress.

## 2020-11-09 LAB
ANION GAP SERPL CALCULATED.3IONS-SCNC: 10.2 MMOL/L (ref 5–15)
BACTERIA SPEC AEROBE CULT: NORMAL
BACTERIA SPEC AEROBE CULT: NORMAL
BUN SERPL-MCNC: 18 MG/DL (ref 8–23)
BUN/CREAT SERPL: 15.9 (ref 7–25)
CALCIUM SPEC-SCNC: 6.8 MG/DL (ref 8.6–10.5)
CHLORIDE SERPL-SCNC: 114 MMOL/L (ref 98–107)
CO2 SERPL-SCNC: 22.8 MMOL/L (ref 22–29)
CREAT SERPL-MCNC: 1.13 MG/DL (ref 0.76–1.27)
DEPRECATED RDW RBC AUTO: 56 FL (ref 37–54)
ERYTHROCYTE [DISTWIDTH] IN BLOOD BY AUTOMATED COUNT: 17.2 % (ref 12.3–15.4)
GFR SERPL CREATININE-BSD FRML MDRD: 62 ML/MIN/1.73
GLUCOSE BLDC GLUCOMTR-MCNC: 184 MG/DL (ref 70–130)
GLUCOSE BLDC GLUCOMTR-MCNC: 32 MG/DL (ref 70–130)
GLUCOSE BLDC GLUCOMTR-MCNC: 80 MG/DL (ref 70–130)
GLUCOSE BLDC GLUCOMTR-MCNC: 98 MG/DL (ref 70–130)
GLUCOSE BLDC GLUCOMTR-MCNC: 99 MG/DL (ref 70–130)
GLUCOSE SERPL-MCNC: 67 MG/DL (ref 65–99)
HCT VFR BLD AUTO: 28.9 % (ref 37.5–51)
HGB BLD-MCNC: 9.2 G/DL (ref 13–17.7)
MCH RBC QN AUTO: 29 PG (ref 26.6–33)
MCHC RBC AUTO-ENTMCNC: 31.8 G/DL (ref 31.5–35.7)
MCV RBC AUTO: 91.2 FL (ref 79–97)
PLATELET # BLD AUTO: 106 10*3/MM3 (ref 140–450)
PMV BLD AUTO: 11.7 FL (ref 6–12)
POTASSIUM SERPL-SCNC: 3.1 MMOL/L (ref 3.5–5.2)
RBC # BLD AUTO: 3.17 10*6/MM3 (ref 4.14–5.8)
SODIUM SERPL-SCNC: 147 MMOL/L (ref 136–145)
WBC # BLD AUTO: 9.84 10*3/MM3 (ref 3.4–10.8)

## 2020-11-09 PROCEDURE — 97530 THERAPEUTIC ACTIVITIES: CPT

## 2020-11-09 PROCEDURE — 97110 THERAPEUTIC EXERCISES: CPT

## 2020-11-09 PROCEDURE — 80048 BASIC METABOLIC PNL TOTAL CA: CPT | Performed by: INTERNAL MEDICINE

## 2020-11-09 PROCEDURE — 85027 COMPLETE CBC AUTOMATED: CPT | Performed by: INTERNAL MEDICINE

## 2020-11-09 PROCEDURE — 82962 GLUCOSE BLOOD TEST: CPT

## 2020-11-09 PROCEDURE — 99232 SBSQ HOSP IP/OBS MODERATE 35: CPT | Performed by: FAMILY MEDICINE

## 2020-11-09 RX ORDER — QUETIAPINE FUMARATE 25 MG/1
50 TABLET, FILM COATED ORAL NIGHTLY
Status: DISCONTINUED | OUTPATIENT
Start: 2020-11-09 | End: 2020-11-12 | Stop reason: HOSPADM

## 2020-11-09 RX ORDER — POTASSIUM CHLORIDE 750 MG/1
40 CAPSULE, EXTENDED RELEASE ORAL ONCE
Status: COMPLETED | OUTPATIENT
Start: 2020-11-09 | End: 2020-11-09

## 2020-11-09 RX ADMIN — DEXTROSE MONOHYDRATE 25 G: 500 INJECTION PARENTERAL at 21:30

## 2020-11-09 RX ADMIN — PANTOPRAZOLE SODIUM 40 MG: 40 INJECTION, POWDER, FOR SOLUTION INTRAVENOUS at 21:31

## 2020-11-09 RX ADMIN — MULTIPLE VITAMINS W/ MINERALS TAB 1 TABLET: TAB at 10:45

## 2020-11-09 RX ADMIN — MINERAL SUPPLEMENT IRON 300 MG / 5 ML STRENGTH LIQUID 100 PER BOX UNFLAVORED 300 MG: at 10:05

## 2020-11-09 RX ADMIN — BUDESONIDE AND FORMOTEROL FUMARATE DIHYDRATE 2 PUFF: 160; 4.5 AEROSOL RESPIRATORY (INHALATION) at 10:10

## 2020-11-09 RX ADMIN — PANTOPRAZOLE SODIUM 40 MG: 40 INJECTION, POWDER, FOR SOLUTION INTRAVENOUS at 10:05

## 2020-11-09 RX ADMIN — SODIUM CHLORIDE, PRESERVATIVE FREE 10 ML: 5 INJECTION INTRAVENOUS at 21:31

## 2020-11-09 RX ADMIN — ALBUTEROL SULFATE 2 PUFF: 90 AEROSOL, METERED RESPIRATORY (INHALATION) at 21:30

## 2020-11-09 RX ADMIN — Medication 30 ML: at 14:05

## 2020-11-09 RX ADMIN — BUDESONIDE AND FORMOTEROL FUMARATE DIHYDRATE 2 PUFF: 160; 4.5 AEROSOL RESPIRATORY (INHALATION) at 21:30

## 2020-11-09 RX ADMIN — POTASSIUM CHLORIDE 40 MEQ: 10 CAPSULE, COATED, EXTENDED RELEASE ORAL at 10:45

## 2020-11-09 RX ADMIN — FOLIC ACID 1 MG: 1 TABLET ORAL at 10:05

## 2020-11-09 RX ADMIN — QUETIAPINE FUMARATE 50 MG: 25 TABLET ORAL at 21:31

## 2020-11-09 RX ADMIN — ALBUTEROL SULFATE 2 PUFF: 90 AEROSOL, METERED RESPIRATORY (INHALATION) at 10:10

## 2020-11-09 RX ADMIN — MINERAL SUPPLEMENT IRON 300 MG / 5 ML STRENGTH LIQUID 100 PER BOX UNFLAVORED 300 MG: at 21:31

## 2020-11-09 RX ADMIN — Medication 30 ML: at 21:31

## 2020-11-09 RX ADMIN — ASPIRIN 81 MG CHEWABLE TABLET 81 MG: 81 TABLET CHEWABLE at 10:05

## 2020-11-09 RX ADMIN — AMOXICILLIN AND CLAVULANATE POTASSIUM 500 MG: 500; 125 TABLET, FILM COATED ORAL at 10:05

## 2020-11-09 RX ADMIN — AMOXICILLIN AND CLAVULANATE POTASSIUM 500 MG: 500; 125 TABLET, FILM COATED ORAL at 21:31

## 2020-11-09 NOTE — PROGRESS NOTES
Continued Stay Note   [place]     Patient Name: Noe Martel  MRN: [id number]  Today's Date: 11/9/2020    Admit Date: 10/23/2020    Discharge Plan     Row Name 11/09/20 1333       Plan    Plan  sp[place], stated not accepting pts today but may be in a couple of days; will look over consult today and call me back to see if possibility, awaiting return call  15:42 EST  Spoke with wife and informed awaiting to hear from [facility], she stated her first choice; informed would need to send to a couple of others for 2nd and 3rd choice just in case; agreed to [facility]; referrals faxed but called also and can look up in Epic         Discharge Codes    No documentation.       Expected Discharge Date and Time     Expected Discharge Date Expected Discharge Time    Nov 9, 2020             Annabel Morin RN

## 2020-11-09 NOTE — THERAPY TREATMENT NOTE
Patient Name: Noe Martel  : 1935    MRN: [id number]                              Today's Date: 2020       Admit Date: 10/23/2020    Visit Dx:     ICD-10-CM ICD-9-CM   1. COVID-19  U07.1 079.89   2. Pneumonia of both lungs due to infectious organism, unspecified part of lung  J18.9 483.8   3. Acute on chronic respiratory failure with hypoxia (CMS/[facility])  J96.21 518.84     799.02   4. Acute renal failure, unspecified acute renal failure type (CMS/[facility])  N17.9 584.9   5. Transaminitis  R74.01 790.4   6. Acute respiratory failure with hypoxia (CMS/[facility])  J96.01 518.81   7. Chronic obstructive pulmonary disease, unspecified COPD type (CMS/[facility])  J44.9 496     Patient Active Problem List   Diagnosis   • Benign prostatic hyperplasia   • Hypertension   • CAD (coronary artery disease)   • Dyslipidemia   • Chronic back pain   • GERD (gastroesophageal reflux disease)   • COPD (chronic obstructive pulmonary disease) (CMS/[facility])   • COVID-19   • Acute respiratory failure with hypoxia (CMS/[facility])   • Sepsis, unspecified organism (CMS/[facility])     Past Medical History:   Diagnosis Date   • BPH (benign prostatic hyperplasia)    • CAD (coronary artery disease)     status post drug-eluting stent deployment in the ostium of the right coronary artery and balloon angioplasty of the obtuse marginal branch of the left circumflex, 2015.  Ejection fraction 60%.   • Chronic back pain    • COPD (chronic obstructive pulmonary disease) (CMS/[facility])    • Dyslipidemia      untreated.    • GERD (gastroesophageal reflux disease)    • Hyperlipidemia    • Hypertension    • Impaired functional mobility, balance, gait, and endurance      Past Surgical History:   Procedure Laterality Date   • CARDIAC SURGERY      STENT PLACEMENT   • CHOLECYSTECTOMY     • HERNIA REPAIR      bilateral     General Information     Row Name 20 1532          OT Time and Intention    Document Type  therapy note (daily note)  -SD     Mode of Treatment   occupational therapy  -SD       User Key  (r) = Recorded By, (t) = Taken By, (c) = Cosigned By    Initials Name Provider Type    Elvia Garcia OT Occupational Therapist        Mobility/ADL's     Row Name 11/09/20 1532          Bed Mobility    Bed Mobility  supine-sit;sit-supine  -SD     Supine-Sit [place] (Bed Mobility)  moderate assist (50% patient effort)  -SD     Sit-Supine [place] (Bed Mobility)  moderate assist (50% patient effort)  -SD     Assistive Device (Bed Mobility)  head of bed elevated  -SD     Row Name 11/09/20 1532          Transfers    Transfers  sit-stand transfer  -SD     Comment (Transfers)  x3  -SD     Sit-Stand [place] (Transfers)  moderate assist (50% patient effort)  -SD     Row Name 11/09/20 1532          Sit-Stand Transfer    Assistive Device (Sit-Stand Transfers)  walker, front-wheeled  -SD     Row Name 11/09/20 1532          Functional Mobility    Functional Mobility- Ind. Level  moderate assist (50% patient effort)  -SD     Functional Mobility- Device  rolling walker  -SD     Functional Mobility-Distance (Feet)  3  -SD     Functional Mobility- Safety Issues  balance decreased during turns;sequencing ability decreased;step length decreased;weight-shifting ability decreased  -SD     Row Name 11/09/20 1532          Grooming Assessment/Training    [place] Level (Grooming)  wash face, hands  -SD     Row Name 11/09/20 1532          Self-Feeding Assessment/Training    [place] Level (Feeding)  liquids to mouth  -SD       User Key  (r) = Recorded By, (t) = Taken By, (c) = Cosigned By    Initials Name Provider Type    Elvia Garcia OT Occupational Therapist        Obj/Interventions     Row Name 11/09/20 1533          Therapeutic Exercise    Therapeutic Exercise  shoulder;elbow/forearm B UE AAROM sitting EOB x 15 to include shoulder flexion/ext, ab/abd, elbow flexion/extenion  -SD       User Key  (r) = Recorded By, (t) = Taken By, (c) = Cosigned By    Initials  Name Provider Type    Elvia Garcia OT Occupational Therapist        Goals/Plan    No documentation.       Clinical Impression     Row Name 11/09/20 1534          Pain Scale: Numbers Pre/Post-Treatment    Pretreatment Pain Rating  0/10 - no pain  -SD     Posttreatment Pain Rating  0/10 - no pain  -SD     Row Name 11/09/20 1534          Plan of Care Review    Plan of Care Reviewed With  patient  -SD     Progress  improving  -SD     Outcome Summary  OT tx completed. patient completed bed mobility, transfer x 3 and side steps along EOB with mod A, completed B UE AAROM and grooming tasks sitting EOB. Continue OT POC  -SD     Row Name 11/09/20 1534          Vital Signs    Pre SpO2 (%)  97  -SD     O2 Delivery Pre Treatment  room air  -SD     O2 Delivery Intra Treatment  room air  -SD     Post SpO2 (%)  96  -SD     O2 Delivery Post Treatment  room air  -SD     Row Name 11/09/20 1534          Positioning and Restraints    Pre-Treatment Position  in bed  -SD     Post Treatment Position  bed  -SD     In Bed  fowlers;call light within reach;encouraged to call for assist;exit alarm on  -SD       User Key  (r) = Recorded By, (t) = Taken By, (c) = Cosigned By    Initials Name Provider Type    Elvia Garcia OT Occupational Therapist        Outcome Measures     Row Name 11/09/20 1535          How much help from another is currently needed...    Putting on and taking off regular lower body clothing?  2  -SD     Bathing (including washing, rinsing, and drying)  2  -SD     Toileting (which includes using toilet bed pan or urinal)  2  -SD     Putting on and taking off regular upper body clothing  2  -SD     Taking care of personal grooming (such as brushing teeth)  2  -SD     Eating meals  2  -SD     AM-PAC 6 Clicks Score (OT)  12  -SD     Row Name 11/09/20 1535          Functional Assessment    Outcome Measure Options  AM-PAC 6 Clicks Daily Activity (OT)  -SD       User Key  (r) = Recorded By, (t) = Taken By, (c) =  Cosigned By    Initials Name Provider Type    SD Elvia Red OT Occupational Therapist        Occupational Therapy Education                 Title: PT OT SLP Therapies (In Progress)     Topic: Occupational Therapy (In Progress)     Point: ADL training (Done)     Description:   Instruct learner(s) on proper safety adaptation and remediation techniques during self care or transfers.   Instruct in proper use of assistive devices.              Learning Progress Summary           Patient Acceptance, E,TB, VU by SD at 11/9/2020 1536    Comment: Safety and sequencing during functional tf and mobility    Acceptance, E,TB, VU by SD at 11/6/2020 1429    Comment: Benefit of OT; OT POC                   Point: Home exercise program (Not Started)     Description:   Instruct learner(s) on appropriate technique for monitoring, assisting and/or progressing therapeutic exercises/activities.              Learner Progress:  Not documented in this visit.          Point: Precautions (Not Started)     Description:   Instruct learner(s) on prescribed precautions during self-care and functional transfers.              Learner Progress:  Not documented in this visit.          Point: Body mechanics (Not Started)     Description:   Instruct learner(s) on proper positioning and spine alignment during self-care, functional mobility activities and/or exercises.              Learner Progress:  Not documented in this visit.                      User Key     Initials Effective Dates Name Provider Type Discipline    SD 03/07/18 -  Elvia Red OT Occupational Therapist OT              OT Recommendation and Plan  Planned Therapy Interventions (OT): activity tolerance training, adaptive equipment training, BADL retraining, patient/caregiver education/training, strengthening exercise, transfer/mobility retraining  Therapy Frequency (OT): 3 times/wk(5 times if indicated)  Plan of Care Review  Plan of Care Reviewed With: patient  Progress:  improving  Outcome Summary: OT tx completed. patient completed bed mobility, transfer x 3 and side steps along EOB with mod A, completed B UE AAROM and grooming tasks sitting EOB. Continue OT POC     Time Calculation:   Time Calculation- OT     Row Name 11/09/20 1536             Time Calculation- OT    OT Start Time  1443  -SD      OT Stop Time  1515  -SD      OT Time Calculation (min)  32 min  -SD         Timed Charges    83264 - OT Therapeutic Exercise Minutes  15  -SD      15528 - OT Therapeutic Activity Minutes  15  -SD        User Key  (r) = Recorded By, (t) = Taken By, (c) = Cosigned By    Initials Name Provider Type    Elvia Garcia OT Occupational Therapist        Therapy Charges for Today     Code Description Service Date Service Provider Modifiers Qty    60231467998  OT THER PROC EA 15 MIN 11/9/2020 Elvia eRd OT GO 1    71287052053  OT THERAPEUTIC ACT EA 15 MIN 11/9/2020 Elvia Red OT GO 1               Elvia Red OT  11/9/2020

## 2020-11-09 NOTE — PLAN OF CARE
Problem: Adult Inpatient Plan of Care  Goal: Plan of Care Review  Recent Flowsheet Documentation  Taken 11/9/2020 1201 by Jerod Bell, HALEY  Progress: improving  Plan of Care Reviewed With: patient  Outcome Summary: Pt tolerated treatment fair. Pt was able to maintain O2 saturations of greaterthan 90% with activity on room air. Pt performed bed mobility with min-mod a as well as sit to stand x 2 mod a with rw.  It was visually obvious that pt was very fatigued post standing. Pt was left supine in bed with bed in chair position and exit alrm activited.  See flowsheet for details

## 2020-11-09 NOTE — THERAPY TREATMENT NOTE
Patient Name: Noe Martel  : 1935    MRN: [id number]                              Today's Date: 2020       Admit Date: 10/23/2020    Visit Dx:     ICD-10-CM ICD-9-CM   1. COVID-19  U07.1 079.89   2. Pneumonia of both lungs due to infectious organism, unspecified part of lung  J18.9 483.8   3. Acute on chronic respiratory failure with hypoxia (CMS/[facility])  J96.21 518.84     799.02   4. Acute renal failure, unspecified acute renal failure type (CMS/[facility])  N17.9 584.9   5. Transaminitis  R74.01 790.4   6. Acute respiratory failure with hypoxia (CMS/[facility])  J96.01 518.81   7. Chronic obstructive pulmonary disease, unspecified COPD type (CMS/[facility])  J44.9 496     Patient Active Problem List   Diagnosis   • Benign prostatic hyperplasia   • Hypertension   • CAD (coronary artery disease)   • Dyslipidemia   • Chronic back pain   • GERD (gastroesophageal reflux disease)   • COPD (chronic obstructive pulmonary disease) (CMS/[facility])   • COVID-19   • Acute respiratory failure with hypoxia (CMS/[facility])   • Sepsis, unspecified organism (CMS/[facility])     Past Medical History:   Diagnosis Date   • BPH (benign prostatic hyperplasia)    • CAD (coronary artery disease)     status post drug-eluting stent deployment in the ostium of the right coronary artery and balloon angioplasty of the obtuse marginal branch of the left circumflex, 2015.  Ejection fraction 60%.   • Chronic back pain    • COPD (chronic obstructive pulmonary disease) (CMS/[facility])    • Dyslipidemia      untreated.    • GERD (gastroesophageal reflux disease)    • Hyperlipidemia    • Hypertension    • Impaired functional mobility, balance, gait, and endurance      Past Surgical History:   Procedure Laterality Date   • CARDIAC SURGERY      STENT PLACEMENT   • CHOLECYSTECTOMY     • HERNIA REPAIR      bilateral     General Information     Row Name 20 1158          Physical Therapy Time and Intention    Document Type  therapy note (daily note)  -RM     Mode of  Treatment  physical therapy  -RM     Row Name 11/09/20 1158          General Information    Patient Profile Reviewed  yes  -RM     Existing Precautions/Restrictions  fall  -RM     Row Name 11/09/20 1158          Cognition    Orientation Status (Cognition)  person  -RM     Row Name 11/09/20 1158          Safety Issues, Functional Mobility    Safety Issues Affecting Function (Mobility)  insight into deficits/self-awareness;positioning of assistive device;safety precaution awareness  -RM     Impairments Affecting Function (Mobility)  balance;cognition;endurance/activity tolerance;motor planning;postural/trunk control;strength  -RM     Cognitive Impairments, Mobility Safety/Performance  attention;problem-solving/reasoning;safety precaution awareness;insight into deficits/self-awareness  -RM       User Key  (r) = Recorded By, (t) = Taken By, (c) = Cosigned By    Initials Name Provider Type    Jerod Finley PTA Physical Therapy Assistant        Mobility     Row Name 11/09/20 1159          Bed Mobility    Scooting/Bridging [place] (Bed Mobility)  minimum assist (75% patient effort);verbal cues  -RM     Supine-Sit [place] (Bed Mobility)  moderate assist (50% patient effort);verbal cues  -RM     Sit-Supine [place] (Bed Mobility)  minimum assist (75% patient effort);moderate assist (50% patient effort);verbal cues  -RM     Row Name 11/09/20 1159          Transfers    Comment (Transfers)  sit to stand x 2  -RM     Row Name 11/09/20 1159          Sit-Stand Transfer    Sit-Stand [place] (Transfers)  moderate assist (50% patient effort);verbal cues  -RM     Assistive Device (Sit-Stand Transfers)  walker, front-wheeled  -RM     Row Name 11/09/20 1159          Gait/Stairs (Locomotion)    [place] Level (Gait)  unable to assess  -RM       User Key  (r) = Recorded By, (t) = Taken By, (c) = Cosigned By    Initials Name Provider Type    Jerod Finley PTA Physical Therapy Assistant         Obj/Interventions    No documentation.       Goals/Plan    No documentation.       Clinical Impression     Row Name 11/09/20 1201          Pain Scale: Numbers Pre/Post-Treatment    Pretreatment Pain Rating  0/10 - no pain  -RM     Posttreatment Pain Rating  0/10 - no pain  -RM     Row Name 11/09/20 1201          Plan of Care Review    Plan of Care Reviewed With  patient  -RM     Progress  improving  -RM     Outcome Summary  Pt tolerated treatment fair. Pt was able to maintain O2 saturations of greaterthan 90% with activity on room air. Pt performed bed mobility with min-mod a as well as sit to stand x 2 mod a with rw.  It was visually obvious that pt was very fatigued post standing. Pt was left in bed with bed in chair position and exit alrm activited.  See flowsheet for details  -RM       User Key  (r) = Recorded By, (t) = Taken By, (c) = Cosigned By    Initials Name Provider Type    Jerod Finley, HALEY Physical Therapy Assistant        Outcome Measures     Row Name 11/09/20 1205          How much help from another person do you currently need...    Turning from your back to your side while in flat bed without using bedrails?  2  -RM     Moving from lying on back to sitting on the side of a flat bed without bedrails?  2  -RM     Moving to and from a bed to a chair (including a wheelchair)?  1  -RM     Standing up from a chair using your arms (e.g., wheelchair, bedside chair)?  2  -RM     Climbing 3-5 steps with a railing?  1  -RM     To walk in hospital room?  1  -RM     AM-PAC 6 Clicks Score (PT)  9  -RM     Row Name 11/09/20 1205          Functional Assessment    Outcome Measure Options  AM-PAC 6 Clicks Basic Mobility (PT)  -RM       User Key  (r) = Recorded By, (t) = Taken By, (c) = Cosigned By    Initials Name Provider Type    Jerod Finley, HALEY Physical Therapy Assistant        Physical Therapy Education                 Title: PT OT SLP Therapies (In Progress)     Topic: Physical Therapy (In  Progress)     Point: Mobility training (Done)     Learning Progress Summary           Patient Acceptance, E,TB,D, VU,NR by  at 11/9/2020 1205    Comment: safety with mobility    Acceptance, E, NR by TW at 11/8/2020 1518    Comment: Readiness is decreased due to pt not seeming to understand cues given to him this date. Pt education on LE ther ex technique and bed mobility.    Acceptance, E,D, NR by TW at 11/7/2020 1211    Comment: Pt education on LE ther ex technique and safety and technique with using rolling walker. Pt had difficulty focusing and will need reinforcement.    Acceptance, E,TB, VU by LM at 11/6/2020 1339    Comment: Purpose of PT/POC; importance of consistent activity to improve functional status.    Acceptance, E,TB, VU by  at 11/5/2020 1503    Comment: Purpose of PT.    Acceptance, E,TB, VU by  at 11/2/2020 1534    Comment: Ther ex ; purpose of PT.    Acceptance, D,E, DU,NR by TW at 10/31/2020 1022    Comment: Pt education for LE ther ex technique with pt needing visual cues to perform correctly. Pt education also with rolling walker for transfer technique    Acceptance, E,TB, VU by  at 10/29/2020 1514    Comment: Purpose of PT; ther ex for HEP.    Acceptance, E,TB, VU by  at 10/28/2020 1600    Comment: Purpose of PT/POC.                   Point: Home exercise program (In Progress)     Learning Progress Summary           Patient Acceptance, E, NR by TW at 11/8/2020 1518    Comment: Readiness is decreased due to pt not seeming to understand cues given to him this date. Pt education on LE ther ex technique and bed mobility.    Acceptance, E,D, NR by TW at 11/7/2020 1211    Comment: Pt education on LE ther ex technique and safety and technique with using rolling walker. Pt had difficulty focusing and will need reinforcement.    Acceptance, E,TB, VU by  at 11/6/2020 1339    Comment: Purpose of PT/POC; importance of consistent activity to improve functional status.    Acceptance, E,TB, VU by   at 11/5/2020 1503    Comment: Purpose of PT.    Acceptance, E,TB, VU by  at 11/2/2020 1534    Comment: Ther ex ; purpose of PT.    Acceptance, E,D, DU,NR by  at 11/1/2020 1115    Comment: Pt education on LE ther ex technique. Pt falling asleep during session and will need reinforcement of ther ex.    Acceptance, D,E, DU,NR by  at 10/31/2020 1022    Comment: Pt education for LE ther ex technique with pt needing visual cues to perform correctly. Pt education also with rolling walker for transfer technique    Acceptance, E,TB, VU by  at 10/29/2020 1514    Comment: Purpose of PT; ther ex for HEP.    Acceptance, E,TB, VU by  at 10/28/2020 1600    Comment: Purpose of PT/POC.                   Point: Precautions (Done)     Learning Progress Summary           Patient Acceptance, E,TB, VU by  at 11/6/2020 1339    Comment: Purpose of PT/POC; importance of consistent activity to improve functional status.    Acceptance, E,TB, VU by  at 11/5/2020 1503    Comment: Purpose of PT.    Acceptance, E,TB, VU by  at 11/2/2020 1534    Comment: Ther ex ; purpose of PT.    Acceptance, E,TB, VU by  at 10/29/2020 1514    Comment: Purpose of PT; ther ex for HEP.    Acceptance, E,TB, VU by  at 10/28/2020 1600    Comment: Purpose of PT/POC.                               User Key     Initials Effective Dates Name Provider Type Discipline     04/03/18 -  Jeni Gracia, PT Physical Therapist PT     03/07/18 -  Jerod Bell, PTA Physical Therapy Assistant PT     03/26/19 -  Angela Mcelroy, PT Physical Therapist PT              PT Recommendation and Plan     Plan of Care Reviewed With: patient  Progress: improving  Outcome Summary: Pt tolerated treatment fair. Pt was able to maintain O2 saturations of greaterthan 90% with activity on room air. Pt performed bed mobility with min-mod a as well as sit to stand x 2 mod a with rw.  It was visually obvious that pt was very fatigued post standing. Pt was left in bed with  bed in chair position and exit alrm activited.  See flowsheet for details     Time Calculation:   PT Charges     Row Name 11/09/20 1206             Time Calculation    Start Time  1100  -RM      Stop Time  1124  -RM      Time Calculation (min)  24 min  -RM      PT Received On  11/09/20  -RM      PT Goal Re-Cert Due Date  11/15/20  -RM         Time Calculation- PT    Total Timed Code Minutes- PT  24 minute(s)  -RM         Timed Charges    51703 - PT Therapeutic Activity Minutes  24  -RM        User Key  (r) = Recorded By, (t) = Taken By, (c) = Cosigned By    Initials Name Provider Type    Jerod Finley, HALEY Physical Therapy Assistant        Therapy Charges for Today     Code Description Service Date Service Provider Modifiers Qty    40766672587 HC PT THERAPEUTIC ACT EA 15 MIN 11/9/2020 Jerod Bell, HALEY GP 2          PT G-Codes  Outcome Measure Options: AM-PAC 6 Clicks Basic Mobility (PT)  AM-PAC 6 Clicks Score (PT): 9  AM-PAC 6 Clicks Score (OT): 12    Jerod Bell PTA  11/9/2020

## 2020-11-09 NOTE — PLAN OF CARE
Problem: Adult Inpatient Plan of Care  Goal: Plan of Care Review  Recent Flowsheet Documentation  Taken 11/9/2020 1534 by Elvia Red OT  Progress: improving  Plan of Care Reviewed With: patient  Outcome Summary: OT tx completed. patient completed bed mobility, transfer x 3 and side steps along EOB with mod A, completed B UE AAROM and grooming tasks sitting EOB. Continue OT POC

## 2020-11-09 NOTE — PROGRESS NOTES
[facility]    Name:  Noe Martel   Age:  85 y.o.  Sex:  male  :  1935  MRN:  [id number]   Visit Number:  [id number]  Admission Date:  10/23/2020  Date Of Service:  20  Primary Care Physician:  Devika Foy APRN     LOS: 17 days :  Patient Care Team:  Devika Foy APRN as PCP - General (Family Medicine):    Chief Complaint:      Follow-up on COVID-19, hypoxia, renal failure    Subjective / Interval History:     Patient seen and examined with appropriate PPE due to COVID-19.  His mental status appears a bit about the same per records.  He denied any specific complaints to me at this time.  I did have to reorient him notify that he was still in the hospital.  He has not been eating or drinking much.    Review of Systems:     Patient with baseline confusion.  Unable to answer most of my questions.  Does deny any shortness of breath.  No abdominal pain.    Vital Signs:    Temp:  [96.3 °F (35.7 °C)-98.6 °F (37 °C)] 98.6 °F (37 °C)  Heart Rate:  [] 100  Resp:  [17-18] 18  BP: (132-157)/(77-97) 157/77    Intake and output:    I/O last 3 completed shifts:  In: 1415.3 [P.O.:120; I.V.:1295.3]  Out: -   No intake/output data recorded.    Physical Examination:    General Appearance:  Alert and cooperative, not in any acute distress.   Head:  Atraumatic and normocephalic, without obvious abnormality.   Eyes:          PERRLA, conjunctivae and sclerae normal, no Icterus. No pallor. Extraocular movements are within normal limits.   Neck: Supple, trachea midline, no thyromegaly.   Lungs:   Breath sounds heard bilaterally equally.  No crackles or wheezing. No Pleural rub or bronchial breathing.   Heart:  Normal S1 and S2, no murmur, no gallop, no rub. No JVD   Abdomen:   Normal bowel sounds, no masses, no organomegaly. Soft, non-tender, non-distended, no guarding, no rebound tenderness.  Condom catheter noted.   Extremities: Moves all extremities well, no  edema, no cyanosis, no clubbing.   Skin: No bleeding, mild bruising noted abdominal wall.  Central line noted right   Neurologic: Awake, alert but confused.  Generalized weakness noted.  No focal deficits.     Laboratory results:    Results from last 7 days   Lab Units 11/09/20  0623 11/08/20  0656 11/07/20  0640  11/05/20 0452 11/04/20  0033   SODIUM mmol/L 147* 151* 155*   < > 153* 148*   POTASSIUM mmol/L 3.1* 3.4* 3.2*   < > 3.8 4.6   CHLORIDE mmol/L 114* 122* 126*   < > 127* 116*   CO2 mmol/L 22.8 22.0 21.0*   < > 17.0* 18.0*   BUN mg/dL 18 22 32*   < > 75* 115*   CREATININE mg/dL 1.13 1.37* 1.65*   < > 2.04* 2.42*   CALCIUM mg/dL 6.8* 6.9* 7.0*   < > 6.7* 6.8*   BILIRUBIN mg/dL  --   --   --   --  0.6 0.4   ALK PHOS U/L  --   --   --   --  58 42   ALT (SGPT) U/L  --   --   --   --  19 20   AST (SGOT) U/L  --   --   --   --  16 18   GLUCOSE mg/dL 67 85 96   < > 139* 150*    < > = values in this interval not displayed.     Results from last 7 days   Lab Units 11/09/20  0623 11/08/20  0656 11/07/20  0641   WBC 10*3/mm3 9.84 9.71 11.12*   HEMOGLOBIN g/dL 9.2* 8.9* 8.9*   HEMATOCRIT % 28.9* 26.9* 28.3*   PLATELETS 10*3/mm3 106* 93* 107*         Results from last 7 days   Lab Units 11/05/20 0452 11/04/20  0033   CK TOTAL U/L  --  245*   TROPONIN T ng/mL 0.038*  --      Results from last 7 days   Lab Units 11/04/20  0830 11/04/20  0823   BLOODCX  No growth at 5 days No growth at 5 days     Results from last 7 days   Lab Units 11/04/20  0337   PH, ARTERIAL pH units 7.463   PO2 ART mm Hg 61.8*   PCO2, ARTERIAL mm Hg 22.1*   HCO3 ART mmol/L 15.8*       I have reviewed the patient's laboratory results.    Radiology results:    Imaging Results (Last 24 Hours)     ** No results found for the last 24 hours. **          I have reviewed the patient's radiology reports.    Medication Review:     I have reviewed the patients active and prn medications.       Acute respiratory failure with hypoxia (CMS/HCC)    Hypertension     CAD (coronary artery disease)    GERD (gastroesophageal reflux disease)    COVID-19    Sepsis, unspecified organism (CMS/[facility])      Assessment:    1.  Acute hypoxic respiratory failure secondary to #2, present on admission.  2.  Patchy bilateral COVID-19 pneumonia, present on admission, improved.  3.  Acute renal failure, present on admission, not related to sepsis, resolved.  4.  Sepsis secondary to #2, present on admission, resolved.  5.  Septic shock developed on 11/3/2020, not present on admission, resolved.  6.  Suspected bilateral pyelonephritis, improved.  7.  Acute blood loss anemia likely secondary to upper GI bleeding, status post 3 units PRBC.  8.  Essential hypertension.  9.  Coronary artery disease.  10.  Gastroesophageal reflux disease.  11.  Suspected underlying dementia.    Plan:    Labs reviewed and overall stable.  Hemoglobin stable at 9.2.  Had received 3 units of packed red blood cells.  Sodium is at 147 potassium 3.1.  Vitals appear stable with no fever.    Patient will finish Augmentin for urinary tract infection today.  Was started on Seroquel at night, increased now.  He seems to be voiding well.  No evidence of any worsening sepsis at this time.  Has previously completed treatment for COVID-19.  Continue on iron supplementation and folic acid supplementation.  Continue to hold on any invasive work-up for GI bleed concerns per surgery recommendations at this time.    We are working on placement options for patient.  Case management talking with wife.  Hopefully can be arranged in the next couple of days.    Magdalene Reis,   11/09/20  15:43 EST    Dictated utilizing Dragon dictation.

## 2020-11-09 NOTE — PROGRESS NOTES
Adult Nutrition  Assessment/PES    Patient Name:  Noe Martel  YOB: 1935  MRN: [id number]  Admit Date:  10/23/2020    Assessment Date:  11/9/2020    Comments:    Recommend:  1. Continue current diet order as medically appropriate and tolerated.  2. Encourage PO intake. Average intake 6& x 8 meals.   3. RD ordered Mighty Shake BID. Continue Magic Cup BID, Prostat BID, and Boost Pudding TID.   4. Continue a multivitamin with minerals daily.     RD to follow pt and available PRN.      Reason for Assessment     Row Name 11/09/20 1530          Reason for Assessment    Reason For Assessment  follow-up protocol     Diagnosis  cardiac disease;infection/sepsis;pulmonary disease;renal disease CAD, COPD, HTN, HLD, GERD, Sepsis, COVID, PNA, Grace     Identified At Risk by Screening Criteria  large or nonhealing wound, burn or pressure injury;difficulty chewing/swallowing             Labs/Tests/Procedures/Meds     Row Name 11/09/20 1531          Labs/Procedures/Meds    Lab Results Reviewed  reviewed, pertinent     Lab Results Comments  Low: Alb, K, Platelets; High: Na, Cl, HgbA1c        Medications    Pertinent Medications Reviewed  reviewed, pertinent     Pertinent Medications Comments  ferrous sulfate, Folvite, Novolog, multivitamin w/ minerals, Prostat, Protonix         Physical Findings     Row Name 11/09/20 1532          Physical Findings    Skin  pressure injury;other (see comments) Bilateral gluteal and right heel pressure injuries, MASD           Nutrition Prescription Ordered     Row Name 11/09/20 1532          Nutrition Prescription PO    Current PO Diet  Dysphagia     Dysphagia Level  2  Pureed     Fluid Consistency  Nectar/syrup thick     Supplement  Boost Pudding (Ensure Pudding);Magic Cup;ProStat     Common Modifiers  Cardiac         Evaluation of Received Nutrient/Fluid Intake     Row Name 11/09/20 1534          PO Evaluation    Number of Days PO Intake Evaluated  3 days     Number of Meals  8      % PO Intake  6               Problem/Interventions:  Problem 1     Row Name 11/09/20 1533          Nutrition Diagnoses Problem 1    Problem 1  Increased Nutrient Needs     Macronutrient  Kcal;Fluid;Protein     Etiology (related to)  Medical Diagnosis     Infectious Disease  Sepsis     Pulmonary/Critical Care  Pneumonia;COPD;Other (comment) COVID     Skin  Pressure injury     Signs/Symptoms (evidenced by)  Report/Observation     Reported/Observed By  MD         Problem 2     Row Name 11/09/20 1534          Nutrition Diagnoses Problem 2    Problem 2  Altered Nutrition Related to Labs     Etiology (related to)  Medical Diagnosis     Endocrine  Hyperglycemia     Renal  SANG     Signs/Symptoms (evidenced by)  Biochemical     Specific Labs Noted  BUN;Creatinine;HgbA1C     Resolved?  Yes         Problem 3     Row Name 11/09/20 1534          Nutrition Diagnoses Problem 3    Problem 3  Inadequate Intake/Infusion     Inadequate Intake Type  Oral     Macronutrient  Kcal;Fat;Fluid;Fiber;Carbohydrate;Protein     Micronutrient  Vitamin;Mineral     Etiology (related to)  MNT for Treatment/Condition     Signs/Symptoms (evidenced by)  PO Intake     Percent (%) intake recorded  6 %     Over number of meals  8     Resolved?  -- Diet order advanced           Intervention Goal     Row Name 11/09/20 1534          Intervention Goal    General  Improved nutrition related lab(s);Meet nutritional needs for age/condition     PO  Meet estimated needs;Establish PO;Tolerate PO;Increase intake;PO intake (%)     PO Intake %  50 %     TF/PN  Inititiate TF/PN     Weight  Maintain weight         Nutrition Intervention     Row Name 11/09/20 1534          Nutrition Intervention    RD/Tech Action  Follow Tx progress;Encourage intake;Recommend/ordered     Recommended/Ordered  EN;Supplement         Nutrition Prescription     Row Name 11/09/20 1535          Nutrition Prescription PO    PO Prescription  Begin/change supplement     Supplement  Mighty Shake      Supplement Frequency  2 times a day     New PO Prescription Ordered?  Yes        Other Orders    Obtain Weight  Daily     Obtain Weight Ordered?  No, recommended     Supplement  Vitamin mineral supplement Continue         Education/Evaluation     Row Name 11/09/20 1537          Education    Education  Education not appropriate at this time     Please explain  Other (comment) Isolation Status        Monitor/Evaluation    Monitor  Per protocol;I&O;PO intake;Supplement intake;Pertinent labs;Weight;Skin status           Electronically signed by:  Julia Saxena RD  11/09/20 15:48 EST

## 2020-11-09 NOTE — PLAN OF CARE
Problem: Adult Inpatient Plan of Care  Goal: Plan of Care Review  Outcome: Ongoing, Progressing  Flowsheets (Taken 11/9/2020 0435)  Progress: improving  Plan of Care Reviewed With: patient  Outcome Summary: VSS, O2 MAINTAINED >90% ON ROOM AIR, REQUIRES TOTAL CARE WITH ADL'S PER NURSING STAFF.  AGITATED AT TIMES WITH PULLING OFF CLOTHES, PULLING OUT IV'S, DRESSINGS.   Goal Outcome Evaluation:  Plan of Care Reviewed With: patient  Progress: improving  Outcome Summary: VSS, O2 MAINTAINED >90% ON ROOM AIR, REQUIRES TOTAL CARE WITH ADL'S PER NURSING STAFF.  AGITATED AT TIMES WITH PULLING OFF CLOTHES, PULLING OUT IV'S, DRESSINGS.

## 2020-11-09 NOTE — PLAN OF CARE
Goal Outcome Evaluation:  Plan of Care Reviewed With: patient  Progress: improving  Outcome Summary: pleasantly confused patient with bed alarm on at all times-medications given per Dr. Reis's orders-monitor blood sugar with coverage per protocol-monitor labs and continue to monitor patient-possible discharge to rehab

## 2020-11-10 LAB
ANION GAP SERPL CALCULATED.3IONS-SCNC: 6.3 MMOL/L (ref 5–15)
BUN SERPL-MCNC: 20 MG/DL (ref 8–23)
BUN/CREAT SERPL: 19.4 (ref 7–25)
CALCIUM SPEC-SCNC: 6.9 MG/DL (ref 8.6–10.5)
CHLORIDE SERPL-SCNC: 114 MMOL/L (ref 98–107)
CO2 SERPL-SCNC: 23.7 MMOL/L (ref 22–29)
CREAT SERPL-MCNC: 1.03 MG/DL (ref 0.76–1.27)
GFR SERPL CREATININE-BSD FRML MDRD: 69 ML/MIN/1.73
GLUCOSE BLDC GLUCOMTR-MCNC: 147 MG/DL (ref 70–130)
GLUCOSE BLDC GLUCOMTR-MCNC: 96 MG/DL (ref 70–130)
GLUCOSE SERPL-MCNC: 92 MG/DL (ref 65–99)
POTASSIUM SERPL-SCNC: 3.2 MMOL/L (ref 3.5–5.2)
SODIUM SERPL-SCNC: 144 MMOL/L (ref 136–145)

## 2020-11-10 PROCEDURE — 94799 UNLISTED PULMONARY SVC/PX: CPT

## 2020-11-10 PROCEDURE — 97110 THERAPEUTIC EXERCISES: CPT

## 2020-11-10 PROCEDURE — 80048 BASIC METABOLIC PNL TOTAL CA: CPT | Performed by: FAMILY MEDICINE

## 2020-11-10 PROCEDURE — 99232 SBSQ HOSP IP/OBS MODERATE 35: CPT | Performed by: FAMILY MEDICINE

## 2020-11-10 PROCEDURE — 97116 GAIT TRAINING THERAPY: CPT

## 2020-11-10 PROCEDURE — 82962 GLUCOSE BLOOD TEST: CPT

## 2020-11-10 PROCEDURE — 97530 THERAPEUTIC ACTIVITIES: CPT

## 2020-11-10 RX ORDER — PANTOPRAZOLE SODIUM 40 MG/1
40 TABLET, DELAYED RELEASE ORAL
Status: DISCONTINUED | OUTPATIENT
Start: 2020-11-10 | End: 2020-11-12 | Stop reason: HOSPADM

## 2020-11-10 RX ORDER — POTASSIUM CHLORIDE 750 MG/1
40 CAPSULE, EXTENDED RELEASE ORAL ONCE
Status: COMPLETED | OUTPATIENT
Start: 2020-11-10 | End: 2020-11-10

## 2020-11-10 RX ADMIN — ASPIRIN 81 MG CHEWABLE TABLET 81 MG: 81 TABLET CHEWABLE at 09:45

## 2020-11-10 RX ADMIN — BUDESONIDE AND FORMOTEROL FUMARATE DIHYDRATE 2 PUFF: 160; 4.5 AEROSOL RESPIRATORY (INHALATION) at 09:46

## 2020-11-10 RX ADMIN — BUDESONIDE AND FORMOTEROL FUMARATE DIHYDRATE 2 PUFF: 160; 4.5 AEROSOL RESPIRATORY (INHALATION) at 20:12

## 2020-11-10 RX ADMIN — Medication 30 ML: at 17:19

## 2020-11-10 RX ADMIN — SODIUM CHLORIDE, PRESERVATIVE FREE 10 ML: 5 INJECTION INTRAVENOUS at 09:45

## 2020-11-10 RX ADMIN — MULTIPLE VITAMINS W/ MINERALS TAB 1 TABLET: TAB at 09:45

## 2020-11-10 RX ADMIN — PANTOPRAZOLE SODIUM 40 MG: 40 TABLET, DELAYED RELEASE ORAL at 09:45

## 2020-11-10 RX ADMIN — FOLIC ACID 1 MG: 1 TABLET ORAL at 09:45

## 2020-11-10 RX ADMIN — MINERAL SUPPLEMENT IRON 300 MG / 5 ML STRENGTH LIQUID 100 PER BOX UNFLAVORED 300 MG: at 09:45

## 2020-11-10 RX ADMIN — POTASSIUM CHLORIDE 40 MEQ: 10 CAPSULE, COATED, EXTENDED RELEASE ORAL at 17:19

## 2020-11-10 RX ADMIN — QUETIAPINE FUMARATE 50 MG: 25 TABLET ORAL at 20:10

## 2020-11-10 RX ADMIN — MINERAL SUPPLEMENT IRON 300 MG / 5 ML STRENGTH LIQUID 100 PER BOX UNFLAVORED 300 MG: at 20:10

## 2020-11-10 RX ADMIN — ALBUTEROL SULFATE 2 PUFF: 90 AEROSOL, METERED RESPIRATORY (INHALATION) at 20:12

## 2020-11-10 RX ADMIN — Medication 30 ML: at 20:10

## 2020-11-10 RX ADMIN — POTASSIUM CHLORIDE 40 MEQ: 10 CAPSULE, COATED, EXTENDED RELEASE ORAL at 09:45

## 2020-11-10 NOTE — PROGRESS NOTES
Continued Stay Note   [facility]     Patient Name: Noe Martel  MRN: [id number]  Today's Date: 11/10/2020    Admit Date: 10/23/2020    Discharge Plan     Row Name 11/10/20 1123       Plan    Plan Called [facility]; stated may be able to accept pt over next few days; will get confirmation from md can accept and will let us know then will start precert  14:42 EST  Malaika/Perez Diaz called and stated physician accepted will start precert and should have bed available Thursday; will call us back tomorrow afternoon        Discharge Codes    No documentation.       Expected Discharge Date and Time     Expected Discharge Date Expected Discharge Time    Nov 9, 2020             Annabel Moirn RN

## 2020-11-10 NOTE — PLAN OF CARE
Goal Outcome Evaluation:  Plan of Care Reviewed With: patient  Progress: improving  Outcome Summary: VSS; continue wound care to coccyx; encourage food/fluid intake; plan for DC to rehab

## 2020-11-10 NOTE — PROGRESS NOTES
[facility]    [facility]    Name:  Noe Martel   Age:  85 y.o.  Sex:  male  :  1935  MRN:  [id number]   Visit Number:  [id number]  Admission Date:  10/23/2020  Date Of Service:  11/10/20  Primary Care Physician:  Devika Foy APRN     LOS: 18 days :  Patient Care Team:  Devika Foy APRN as PCP - General (Family Medicine):    Chief Complaint:      Follow-up on COVID-19, hypoxia, renal failure    Subjective / Interval History:     Patient with no significant changes overnight.  He was seen and examined with appropriate PPE due to COVID-19 precautions.  See no significant changes in his mental status from my interactions with him throughout hospitalization.  He specifically denied any shortness of breath, fevers or chills.  His appetite does remain low.    Review of Systems:     A complete review of systems is unobtainable secondary to patient's baseline confused status.    Vital Signs:    Temp:  [97.5 °F (36.4 °C)-99.6 °F (37.6 °C)] 99.6 °F (37.6 °C)  Heart Rate:  [72-88] 85  Resp:  [16-18] 18  BP: (107-146)/(59-90) 107/70    Intake and output:    I/O last 3 completed shifts:  In: 60 [P.O.:60]  Out: 100 [Stool:100]  I/O this shift:  In: 760 [P.O.:760]  Out: 50 [Urine:50]    Physical Examination:    General Appearance:  Alert and cooperative, not in any acute distress.  Frail appearing gentleman   Head:  Atraumatic and normocephalic, without obvious abnormality.   Eyes:          PERRLA, conjunctivae and sclerae normal, no Icterus. No pallor. Extraocular movements are within normal limits.   Neck: Supple, trachea midline, no thyromegaly.   Lungs:   Breath sounds heard bilaterally equally.  No crackles or wheezing. No Pleural rub or bronchial breathing.   Heart:  Normal S1 and S2, no murmur, no gallop, no rub. No JVD   Abdomen:   Normal bowel sounds, no masses, no organomegaly. Soft, non-tender, non-distended, no guarding, no rebound tenderness.   Extremities: Moves all  extremities well, no edema, no cyanosis, no clubbing.   Skin: No bleeding, bruising or rash.  Bruising noted abdominal wall.   Neurologic: Awake, pleasantly confused. Moves all 4 extremities equally.  Generalized weakness     Laboratory results:    Results from last 7 days   Lab Units 11/10/20  0646 11/09/20  0623 11/08/20  0656  11/05/20 0452 11/04/20  0033   SODIUM mmol/L 144 147* 151*   < > 153* 148*   POTASSIUM mmol/L 3.2* 3.1* 3.4*   < > 3.8 4.6   CHLORIDE mmol/L 114* 114* 122*   < > 127* 116*   CO2 mmol/L 23.7 22.8 22.0   < > 17.0* 18.0*   BUN mg/dL 20 18 22   < > 75* 115*   CREATININE mg/dL 1.03 1.13 1.37*   < > 2.04* 2.42*   CALCIUM mg/dL 6.9* 6.8* 6.9*   < > 6.7* 6.8*   BILIRUBIN mg/dL  --   --   --   --  0.6 0.4   ALK PHOS U/L  --   --   --   --  58 42   ALT (SGPT) U/L  --   --   --   --  19 20   AST (SGOT) U/L  --   --   --   --  16 18   GLUCOSE mg/dL 92 67 85   < > 139* 150*    < > = values in this interval not displayed.     Results from last 7 days   Lab Units 11/09/20  0623 11/08/20  0656 11/07/20  0641   WBC 10*3/mm3 9.84 9.71 11.12*   HEMOGLOBIN g/dL 9.2* 8.9* 8.9*   HEMATOCRIT % 28.9* 26.9* 28.3*   PLATELETS 10*3/mm3 106* 93* 107*         Results from last 7 days   Lab Units 11/05/20 0452 11/04/20  0033   CK TOTAL U/L  --  245*   TROPONIN T ng/mL 0.038*  --      Results from last 7 days   Lab Units 11/04/20  0830 11/04/20  0823   BLOODCX  No growth at 5 days No growth at 5 days     Results from last 7 days   Lab Units 11/04/20  0337   PH, ARTERIAL pH units 7.463   PO2 ART mm Hg 61.8*   PCO2, ARTERIAL mm Hg 22.1*   HCO3 ART mmol/L 15.8*       I have reviewed the patient's laboratory results.    Radiology results:    Imaging Results (Last 24 Hours)     ** No results found for the last 24 hours. **          I have reviewed the patient's radiology reports.    Medication Review:     I have reviewed the patients active and prn medications.       Acute respiratory failure with hypoxia (CMS/HCC)     Hypertension    CAD (coronary artery disease)    GERD (gastroesophageal reflux disease)    COVID-19    Sepsis, unspecified organism (CMS/[facility])      Assessment:    1.  Acute hypoxic respiratory failure secondary to #2, present on admission.  2.  Patchy bilateral COVID-19 pneumonia, present on admission, improved.  3.  Acute renal failure, present on admission, not related to sepsis, resolved.  4.  Sepsis secondary to #2, present on admission, resolved.  5.  Septic shock developed on 11/3/2020, not present on admission, resolved.  6.  Suspected bilateral pyelonephritis, improved.  7.  Acute blood loss anemia likely secondary to upper GI bleeding, status post 3 units PRBC.  8.  Essential hypertension.  9.  Coronary artery disease.  10.  Gastroesophageal reflux disease.  11.  Suspected underlying dementia.    Plan:    Patient's vitals reviewed and overall stable.  Is on room air.  He did more with physical therapy today than yesterday.  His BMP is overall stable still with slightly low potassium likely attributable to poor intake.  He has previously completed antiviral therapy as well as dexamethasone for Covid-19.  He had also been on antibiotics for suspected pyelonephritis and septic shock which have resolved at this time.  No further evidence of GI bleeding and he is status post 3 units of packed red blood cells.  Continue on the Seroquel.  He finished Augmentin for the pyelonephritis and urinary tract infection yesterday.    Case management has been discussing his discharge planning with his family.  Currently appears patient will be able to go to [place] [facility], likely Thursday at the earliest.    Magdalene Reis, DO  11/10/20  15:30 EST    Dictated utilizing Dragon dictation.

## 2020-11-10 NOTE — THERAPY TREATMENT NOTE
Patient Name: Noe Martel  : 1935    MRN: [id number]                              Today's Date: 11/10/2020       Admit Date: 10/23/2020    Visit Dx:     ICD-10-CM ICD-9-CM   1. COVID-19  U07.1 079.89   2. Pneumonia of both lungs due to infectious organism, unspecified part of lung  J18.9 483.8   3. Acute on chronic respiratory failure with hypoxia (CMS/[facility])  J96.21 518.84     799.02   4. Acute renal failure, unspecified acute renal failure type (CMS/[facility])  N17.9 584.9   5. Transaminitis  R74.01 790.4   6. Acute respiratory failure with hypoxia (CMS/[facility])  J96.01 518.81   7. Chronic obstructive pulmonary disease, unspecified COPD type (CMS/[facility])  J44.9 496     Patient Active Problem List   Diagnosis   • Benign prostatic hyperplasia   • Hypertension   • CAD (coronary artery disease)   • Dyslipidemia   • Chronic back pain   • GERD (gastroesophageal reflux disease)   • COPD (chronic obstructive pulmonary disease) (CMS/HCC)   • COVID-19   • Acute respiratory failure with hypoxia (CMS/[facility])   • Sepsis, unspecified organism (CMS/[facility])     Past Medical History:   Diagnosis Date   • BPH (benign prostatic hyperplasia)    • CAD (coronary artery disease)     status post drug-eluting stent deployment in the ostium of the right coronary artery and balloon angioplasty of the obtuse marginal branch of the left circumflex, 2015.  Ejection fraction 60%.   • Chronic back pain    • COPD (chronic obstructive pulmonary disease) (CMS/[facility])    • Dyslipidemia      untreated.    • GERD (gastroesophageal reflux disease)    • Hyperlipidemia    • Hypertension    • Impaired functional mobility, balance, gait, and endurance      Past Surgical History:   Procedure Laterality Date   • CARDIAC SURGERY      STENT PLACEMENT   • CHOLECYSTECTOMY     • HERNIA REPAIR      bilateral     General Information     Row Name 11/10/20 1359          OT Time and Intention    Document Type  therapy note (daily note)  -SD     Mode of Treatment   occupational therapy  -SD       User Key  (r) = Recorded By, (t) = Taken By, (c) = Cosigned By    Initials Name Provider Type    Elvia Garcia OT Occupational Therapist        Mobility/ADL's    No documentation.       Obj/Interventions     Row Name 11/10/20 1359          Shoulder (Therapeutic Exercise)    Shoulder (Therapeutic Exercise)  strengthening exercise;AAROM (active assistive range of motion)  -SD     Shoulder AAROM (Therapeutic Exercise)  bilateral;flexion;extension;aBduction;aDduction;10 repetitions  -SD     Shoulder Strengthening (Therapeutic Exercise)  bilateral;flexion;extension;aBduction;aDduction;resistance band;red;10 repetitions  -SD     Row Name 11/10/20 1359          Elbow/Forearm (Therapeutic Exercise)    Elbow/Forearm (Therapeutic Exercise)  AAROM (active assistive range of motion);strengthening exercise  -SD     Elbow/Forearm AAROM (Therapeutic Exercise)  bilateral;flexion;extension  -SD     Elbow/Forearm Strengthening (Therapeutic Exercise)  bilateral;flexion;extension;resistance band;10 repetitions;red  -SD       User Key  (r) = Recorded By, (t) = Taken By, (c) = Cosigned By    Initials Name Provider Type    Elvia Garcia OT Occupational Therapist        Goals/Plan    No documentation.       Clinical Impression     Row Name 11/10/20 1400          Pain Scale: Numbers Pre/Post-Treatment    Pretreatment Pain Rating  0/10 - no pain  -SD     Posttreatment Pain Rating  0/10 - no pain  -SD     Row Name 11/10/20 1400          Plan of Care Review    Plan of Care Reviewed With  patient  -SD     Progress  improving  -SD     Outcome Summary  OT tx completed. patient had just finished working with PT, but agreed to UB ther ex in bed. Tolerated AAROM, followed by UB resistance band exercises. Continue OT POC  -SD     Row Name 11/10/20 1400          Vital Signs    Pre SpO2 (%)  96  -SD     O2 Delivery Pre Treatment  room air  -SD     O2 Delivery Intra Treatment  room air  -SD     Post SpO2 (%)   96  -SD     O2 Delivery Post Treatment  room air  -SD     Row Name 11/10/20 1400          Positioning and Restraints    Pre-Treatment Position  in bed  -SD     Post Treatment Position  bed  -SD     In Bed  fowlers;call light within reach;encouraged to call for assist;exit alarm on  -SD       User Key  (r) = Recorded By, (t) = Taken By, (c) = Cosigned By    Initials Name Provider Type    Elvia Garcia OT Occupational Therapist        Outcome Measures     Row Name 11/10/20 1402          How much help from another is currently needed...    Putting on and taking off regular lower body clothing?  2  -SD     Bathing (including washing, rinsing, and drying)  2  -SD     Toileting (which includes using toilet bed pan or urinal)  2  -SD     Putting on and taking off regular upper body clothing  2  -SD     Taking care of personal grooming (such as brushing teeth)  3  -SD     Eating meals  3  -SD     AM-PAC 6 Clicks Score (OT)  14  -SD     Row Name 11/10/20 1402          Functional Assessment    Outcome Measure Options  AM-PAC 6 Clicks Daily Activity (OT)  -SD       User Key  (r) = Recorded By, (t) = Taken By, (c) = Cosigned By    Initials Name Provider Type    Elvia Garcia OT Occupational Therapist        Occupational Therapy Education                 Title: PT OT SLP Therapies (In Progress)     Topic: Occupational Therapy (In Progress)     Point: ADL training (Done)     Description:   Instruct learner(s) on proper safety adaptation and remediation techniques during self care or transfers.   Instruct in proper use of assistive devices.              Learning Progress Summary           Patient Acceptance, E,TB, VU by SD at 11/9/2020 1536    Comment: Safety and sequencing during functional tf and mobility    Acceptance, E,TB, VU by SD at 11/6/2020 1429    Comment: Benefit of OT; OT POC                   Point: Home exercise program (Done)     Description:   Instruct learner(s) on appropriate technique for  monitoring, assisting and/or progressing therapeutic exercises/activities.              Learning Progress Summary           Patient Acceptance, E,TB, VU by SD at 11/10/2020 1403    Comment: Benefit of UB ther ex                   Point: Precautions (Not Started)     Description:   Instruct learner(s) on prescribed precautions during self-care and functional transfers.              Learner Progress:  Not documented in this visit.          Point: Body mechanics (Not Started)     Description:   Instruct learner(s) on proper positioning and spine alignment during self-care, functional mobility activities and/or exercises.              Learner Progress:  Not documented in this visit.                      User Key     Initials Effective Dates Name Provider Type Discipline    SD 03/07/18 -  Elvia Red OT Occupational Therapist OT              OT Recommendation and Plan  Planned Therapy Interventions (OT): activity tolerance training, adaptive equipment training, BADL retraining, patient/caregiver education/training, strengthening exercise, transfer/mobility retraining  Therapy Frequency (OT): 3 times/wk(5 times if indicated)  Plan of Care Review  Plan of Care Reviewed With: patient  Progress: improving  Outcome Summary: OT tx completed. patient had just finished working with PT, but agreed to UB ther ex in bed. Tolerated AAROM, followed by UB resistance band exercises. Continue OT POC     Time Calculation:   Time Calculation- OT     Row Name 11/10/20 1404             Time Calculation- OT    OT Start Time  1325  -SD      OT Stop Time  1350  -SD      OT Time Calculation (min)  25 min  -SD         Timed Charges    98503 - OT Therapeutic Exercise Minutes  25  -SD        User Key  (r) = Recorded By, (t) = Taken By, (c) = Cosigned By    Initials Name Provider Type    SD Elvia Red OT Occupational Therapist        Therapy Charges for Today     Code Description Service Date Service Provider Modifiers Qty     [id number]  OT THER PROC EA 15 MIN 11/9/2020 Elvia Red OT GO 1    [id number]  OT THERAPEUTIC ACT EA 15 MIN 11/9/2020 Elvia Red, OT GO 1    [id number]  OT THER PROC EA 15 MIN 11/10/2020 Elvia Red OT GO 2               Elvia Red, OT  11/10/2020

## 2020-11-10 NOTE — PLAN OF CARE
Problem: Adult Inpatient Plan of Care  Goal: Plan of Care Review  Recent Flowsheet Documentation  Taken 11/10/2020 1400 by Elvia Red OT  Progress: improving  Plan of Care Reviewed With: patient  Outcome Summary: OT tx completed. patient had just finished working with PT, but agreed to UB ther ex in bed. Tolerated AAROM, followed by UB resistance band exercises. Continue OT POC

## 2020-11-10 NOTE — PROGRESS NOTES
Patient has been accepted at [facility] in [facility].  They are currently working on obtaining a precert.  Palliative services will continue to follow.

## 2020-11-10 NOTE — THERAPY TREATMENT NOTE
Patient Name: Noe Martel  : 1935    MRN: [id number]                              Today's Date: 11/10/2020       Admit Date: 10/23/2020    Visit Dx:     ICD-10-CM ICD-9-CM   1. COVID-19  U07.1 079.89   2. Pneumonia of both lungs due to infectious organism, unspecified part of lung  J18.9 483.8   3. Acute on chronic respiratory failure with hypoxia (CMS/[facility])  J96.21 518.84     799.02   4. Acute renal failure, unspecified acute renal failure type (CMS/[facility])  N17.9 584.9   5. Transaminitis  R74.01 790.4   6. Acute respiratory failure with hypoxia (CMS/[facility])  J96.01 518.81   7. Chronic obstructive pulmonary disease, unspecified COPD type (CMS/[facility])  J44.9 496     Patient Active Problem List   Diagnosis   • Benign prostatic hyperplasia   • Hypertension   • CAD (coronary artery disease)   • Dyslipidemia   • Chronic back pain   • GERD (gastroesophageal reflux disease)   • COPD (chronic obstructive pulmonary disease) (CMS/[facility])   • COVID-19   • Acute respiratory failure with hypoxia (CMS/[facility])   • Sepsis, unspecified organism (CMS/[facility])     Past Medical History:   Diagnosis Date   • BPH (benign prostatic hyperplasia)    • CAD (coronary artery disease)     status post drug-eluting stent deployment in the ostium of the right coronary artery and balloon angioplasty of the obtuse marginal branch of the left circumflex, 2015.  Ejection fraction 60%.   • Chronic back pain    • COPD (chronic obstructive pulmonary disease) (CMS/[facility])    • Dyslipidemia      untreated.    • GERD (gastroesophageal reflux disease)    • Hyperlipidemia    • Hypertension    • Impaired functional mobility, balance, gait, and endurance      Past Surgical History:   Procedure Laterality Date   • CARDIAC SURGERY      STENT PLACEMENT   • CHOLECYSTECTOMY     • HERNIA REPAIR      bilateral     General Information     Row Name 11/10/20 7905          Physical Therapy Time and Intention    Document Type  therapy note (daily note)  -RM     Mode of  Treatment  physical therapy  -RM     Row Name 11/10/20 1505          General Information    Patient Profile Reviewed  yes  -RM     Existing Precautions/Restrictions  fall  -RM     Row Name 11/10/20 1505          Cognition    Orientation Status (Cognition)  oriented x 3  -RM     Row Name 11/10/20 1505          Safety Issues, Functional Mobility    Impairments Affecting Function (Mobility)  balance;endurance/activity tolerance;shortness of breath;strength  -RM     Cognitive Impairments, Mobility Safety/Performance  safety precaution awareness  -RM       User Key  (r) = Recorded By, (t) = Taken By, (c) = Cosigned By    Initials Name Provider Type    RM Jerod Bell, PTA Physical Therapy Assistant        Mobility     Row Name 11/10/20 1509          Bed Mobility    Supine-Sit Isabela (Bed Mobility)  minimum assist (75% patient effort);verbal cues  -RM     Sit-Supine Isabela (Bed Mobility)  moderate assist (50% patient effort);verbal cues  -RM     Assistive Device (Bed Mobility)  bed rails;head of bed elevated  -RM     Row Name 11/10/20 1509          Transfers    Comment (Transfers)  stand x 3  -RM     Row Name 11/10/20 1509          Sit-Stand Transfer    Sit-Stand Isabela (Transfers)  minimum assist (75% patient effort);verbal cues  -RM     Assistive Device (Sit-Stand Transfers)  walker, front-wheeled  -RM     Row Name 11/10/20 1509          Gait/Stairs (Locomotion)    Isabela Level (Gait)  minimum assist (75% patient effort);verbal cues;nonverbal cues (demo/gesture)  -RM     Assistive Device (Gait)  walker, front-wheeled  -RM     Distance in Feet (Gait)  6'x2  -RM     Deviations/Abnormal Patterns (Gait)  lon decreased;festinating/shuffling;gait speed decreased;stride length decreased;base of support, wide  -RM     Bilateral Gait Deviations  forward flexed posture;heel strike decreased;weight shift ability decreased  -RM       User Key  (r) = Recorded By, (t) = Taken By, (c) = Cosigned By     Initials Name Provider Type    Jerod Finley, HALEY Physical Therapy Assistant        Obj/Interventions    No documentation.       Goals/Plan    No documentation.       Clinical Impression     Row Name 11/10/20 1511          Pain    Additional Documentation  Pain Scale: Numbers Pre/Post-Treatment (Group)  -     Row Name 11/10/20 1511          Pain Scale: Numbers Pre/Post-Treatment    Pretreatment Pain Rating  0/10 - no pain  -RM     Posttreatment Pain Rating  0/10 - no pain  -RM     Row Name 11/10/20 1511          Plan of Care Review    Plan of Care Reviewed With  patient  -RM     Progress  improving  -     Outcome Summary  Pt tolerates treatment well. Pt performed bed mobility with min-mod a, transfers with min a with rw, and ambulated 2x6' min a with rw.  Pt able to maintain O2 saturations above 90% on room air. See flowsheet for details.  -     Row Name 11/10/20 1511          Positioning and Restraints    Pre-Treatment Position  in bed  -RM     Post Treatment Position  bed  -RM     In Bed  fowlers;call light within reach;encouraged to call for assist;exit alarm on;notified nsg  -       User Key  (r) = Recorded By, (t) = Taken By, (c) = Cosigned By    Initials Name Provider Type    Jerod Finley, HALEY Physical Therapy Assistant        Outcome Measures     Row Name 11/10/20 1514          How much help from another person do you currently need...    Turning from your back to your side while in flat bed without using bedrails?  3  -RM     Moving from lying on back to sitting on the side of a flat bed without bedrails?  3  -RM     Moving to and from a bed to a chair (including a wheelchair)?  3  -RM     Standing up from a chair using your arms (e.g., wheelchair, bedside chair)?  2  -RM     Climbing 3-5 steps with a railing?  1  -RM     To walk in hospital room?  3  -RM     AM-PAC 6 Clicks Score (PT)  15  -RM     Row Name 11/10/20 1514          Functional Assessment    Outcome Measure Options  AM-PAC  6 Clicks Basic Mobility (PT)  -       User Key  (r) = Recorded By, (t) = Taken By, (c) = Cosigned By    Initials Name Provider Type    Jerod Finley, PTA Physical Therapy Assistant        Physical Therapy Education                 Title: PT OT SLP Therapies (In Progress)     Topic: Physical Therapy (In Progress)     Point: Mobility training (Done)     Learning Progress Summary           Patient Acceptance, E,TB,D, VU,NR by  at 11/10/2020 1514    Comment: safety and walker positioning    Acceptance, E,TB,D, VU,NR by  at 11/9/2020 1205    Comment: safety with mobility    Acceptance, E, NR by TW at 11/8/2020 1518    Comment: Readiness is decreased due to pt not seeming to understand cues given to him this date. Pt education on LE ther ex technique and bed mobility.    Acceptance, E,D, NR by TW at 11/7/2020 1211    Comment: Pt education on LE ther ex technique and safety and technique with using rolling walker. Pt had difficulty focusing and will need reinforcement.    Acceptance, E,TB, VU by  at 11/6/2020 1339    Comment: Purpose of PT/POC; importance of consistent activity to improve functional status.    Acceptance, E,TB, VU by  at 11/5/2020 1503    Comment: Purpose of PT.    Acceptance, E,TB, VU by  at 11/2/2020 1534    Comment: Ther ex ; purpose of PT.    Acceptance, D,E, DU,NR by  at 10/31/2020 1022    Comment: Pt education for LE ther ex technique with pt needing visual cues to perform correctly. Pt education also with rolling walker for transfer technique    Acceptance, E,TB, VU by  at 10/29/2020 1514    Comment: Purpose of PT; ther ex for HEP.    Acceptance, E,TB, VU by  at 10/28/2020 1600    Comment: Purpose of PT/POC.                   Point: Home exercise program (In Progress)     Learning Progress Summary           Patient Acceptance, E, NR by TW at 11/8/2020 1518    Comment: Readiness is decreased due to pt not seeming to understand cues given to him this date. Pt education on LE  ther ex technique and bed mobility.    Acceptance, E,D, NR by TW at 11/7/2020 1211    Comment: Pt education on LE ther ex technique and safety and technique with using rolling walker. Pt had difficulty focusing and will need reinforcement.    Acceptance, E,TB, VU by  at 11/6/2020 1339    Comment: Purpose of PT/POC; importance of consistent activity to improve functional status.    Acceptance, E,TB, VU by  at 11/5/2020 1503    Comment: Purpose of PT.    Acceptance, E,TB, VU by  at 11/2/2020 1534    Comment: Ther ex ; purpose of PT.    Acceptance, E,D, DU,NR by TW at 11/1/2020 1115    Comment: Pt education on LE ther ex technique. Pt falling asleep during session and will need reinforcement of ther ex.    Acceptance, D,E, DU,NR by  at 10/31/2020 1022    Comment: Pt education for LE ther ex technique with pt needing visual cues to perform correctly. Pt education also with rolling walker for transfer technique    Acceptance, E,TB, VU by  at 10/29/2020 1514    Comment: Purpose of PT; ther ex for HEP.    Acceptance, E,TB, VU by  at 10/28/2020 1600    Comment: Purpose of PT/POC.                   Point: Precautions (Done)     Learning Progress Summary           Patient Acceptance, E,TB, VU by  at 11/6/2020 1339    Comment: Purpose of PT/POC; importance of consistent activity to improve functional status.    Acceptance, E,TB, VU by  at 11/5/2020 1503    Comment: Purpose of PT.    Acceptance, E,TB, VU by  at 11/2/2020 1534    Comment: Ther ex ; purpose of PT.    Acceptance, E,TB, VU by  at 10/29/2020 1514    Comment: Purpose of PT; ther ex for HEP.    Acceptance, E,TB, VU by  at 10/28/2020 1600    Comment: Purpose of PT/POC.                               User Key     Initials Effective Dates Name Provider Type Discipline     04/03/18 -  Jeni Gracia, PT Physical Therapist PT     03/07/18 -  Jerod Bell, PTA Physical Therapy Assistant PT    TW 03/26/19 -  Angela Mcelroy, EDDIE Physical Therapist  PT              PT Recommendation and Plan     Plan of Care Reviewed With: patient  Progress: improving  Outcome Summary: Pt tolerates treatment well. Pt performed bed mobility with min-mod a, transfers with min a with rw, and ambulated 2x6' min a with rw.  Pt able to maintain O2 saturations above 90% on room air. See flowsheet for details.     Time Calculation:   PT Charges     Row Name 11/10/20 1515             Time Calculation    Start Time  1246  -RM      Stop Time  1315  -RM      Time Calculation (min)  29 min  -RM      PT Received On  11/10/20  -RM      PT Goal Re-Cert Due Date  11/15/20  -RM         Time Calculation- PT    Total Timed Code Minutes- PT  29 minute(s)  -RM         Timed Charges    16090 - Gait Training Minutes   12  -RM      03985 - PT Therapeutic Activity Minutes  17  -RM        User Key  (r) = Recorded By, (t) = Taken By, (c) = Cosigned By    Initials Name Provider Type    Jerod Finley, PTA Physical Therapy Assistant        Therapy Charges for Today     Code Description Service Date Service Provider Modifiers Qty    54094569900 HC PT THERAPEUTIC ACT EA 15 MIN 11/9/2020 Jerod Bell, PTA GP 2    44243471860 HC GAIT TRAINING EA 15 MIN 11/10/2020 Jerod Bell, PTA GP 1    02068963868 HC PT THERAPEUTIC ACT EA 15 MIN 11/10/2020 Jerod Bell, PTA GP 1          PT G-Codes  Outcome Measure Options: AM-PAC 6 Clicks Basic Mobility (PT)  AM-PAC 6 Clicks Score (PT): 15  AM-PAC 6 Clicks Score (OT): 14    Jerod Bell PTA  11/10/2020

## 2020-11-10 NOTE — PLAN OF CARE
Problem: Adult Inpatient Plan of Care  Goal: Plan of Care Review  Recent Flowsheet Documentation  Taken 11/10/2020 1511 by Jerod Bell, PTA  Progress: improving  Plan of Care Reviewed With: patient  Outcome Summary: Pt tolerates treatment well. Pt performed bed mobility with min-mod a, transfers with min a with rw, and ambulated 2x6' min a with rw.  Pt able to maintain O2 saturations above 90% on room air. See flowsheet for details.

## 2020-11-11 LAB
ANION GAP SERPL CALCULATED.3IONS-SCNC: 5.9 MMOL/L (ref 5–15)
BUN SERPL-MCNC: 19 MG/DL (ref 8–23)
BUN/CREAT SERPL: 19.4 (ref 7–25)
CALCIUM SPEC-SCNC: 6.9 MG/DL (ref 8.6–10.5)
CHLORIDE SERPL-SCNC: 114 MMOL/L (ref 98–107)
CO2 SERPL-SCNC: 26.1 MMOL/L (ref 22–29)
CREAT SERPL-MCNC: 0.98 MG/DL (ref 0.76–1.27)
GFR SERPL CREATININE-BSD FRML MDRD: 73 ML/MIN/1.73
GLUCOSE SERPL-MCNC: 101 MG/DL (ref 65–99)
HCT VFR BLD AUTO: 29.4 % (ref 37.5–51)
HGB BLD-MCNC: 9.2 G/DL (ref 13–17.7)
POTASSIUM SERPL-SCNC: 4.1 MMOL/L (ref 3.5–5.2)
SODIUM SERPL-SCNC: 146 MMOL/L (ref 136–145)

## 2020-11-11 PROCEDURE — 85018 HEMOGLOBIN: CPT | Performed by: FAMILY MEDICINE

## 2020-11-11 PROCEDURE — 99232 SBSQ HOSP IP/OBS MODERATE 35: CPT | Performed by: FAMILY MEDICINE

## 2020-11-11 PROCEDURE — 80048 BASIC METABOLIC PNL TOTAL CA: CPT | Performed by: FAMILY MEDICINE

## 2020-11-11 PROCEDURE — 85014 HEMATOCRIT: CPT | Performed by: FAMILY MEDICINE

## 2020-11-11 PROCEDURE — 97530 THERAPEUTIC ACTIVITIES: CPT

## 2020-11-11 PROCEDURE — 97110 THERAPEUTIC EXERCISES: CPT

## 2020-11-11 PROCEDURE — 97116 GAIT TRAINING THERAPY: CPT

## 2020-11-11 RX ADMIN — MULTIPLE VITAMINS W/ MINERALS TAB 1 TABLET: TAB at 10:39

## 2020-11-11 RX ADMIN — QUETIAPINE FUMARATE 50 MG: 25 TABLET ORAL at 20:39

## 2020-11-11 RX ADMIN — ALBUTEROL SULFATE 2 PUFF: 90 AEROSOL, METERED RESPIRATORY (INHALATION) at 10:38

## 2020-11-11 RX ADMIN — BUDESONIDE AND FORMOTEROL FUMARATE DIHYDRATE 2 PUFF: 160; 4.5 AEROSOL RESPIRATORY (INHALATION) at 20:39

## 2020-11-11 RX ADMIN — MINERAL SUPPLEMENT IRON 300 MG / 5 ML STRENGTH LIQUID 100 PER BOX UNFLAVORED 300 MG: at 10:39

## 2020-11-11 RX ADMIN — MINERAL SUPPLEMENT IRON 300 MG / 5 ML STRENGTH LIQUID 100 PER BOX UNFLAVORED 300 MG: at 20:39

## 2020-11-11 RX ADMIN — ALBUTEROL SULFATE 2 PUFF: 90 AEROSOL, METERED RESPIRATORY (INHALATION) at 20:39

## 2020-11-11 RX ADMIN — SODIUM CHLORIDE, PRESERVATIVE FREE 10 ML: 5 INJECTION INTRAVENOUS at 10:39

## 2020-11-11 RX ADMIN — Medication 30 ML: at 20:39

## 2020-11-11 RX ADMIN — Medication 30 ML: at 10:39

## 2020-11-11 RX ADMIN — FOLIC ACID 1 MG: 1 TABLET ORAL at 10:39

## 2020-11-11 RX ADMIN — PANTOPRAZOLE SODIUM 40 MG: 40 TABLET, DELAYED RELEASE ORAL at 06:41

## 2020-11-11 RX ADMIN — BUDESONIDE AND FORMOTEROL FUMARATE DIHYDRATE 2 PUFF: 160; 4.5 AEROSOL RESPIRATORY (INHALATION) at 10:38

## 2020-11-11 RX ADMIN — ASPIRIN 81 MG CHEWABLE TABLET 81 MG: 81 TABLET CHEWABLE at 10:39

## 2020-11-11 NOTE — PLAN OF CARE
Goal Outcome Evaluation:  Plan of Care Reviewed With: patient  Progress: improving  Outcome Summary: VSS; no events overnight; pt states he feels much better; plan for DC to rehab in next couple days

## 2020-11-11 NOTE — PROGRESS NOTES
[facility]    Name:  Noe Martel   Age:  85 y.o.  Sex:  male  :  1935  MRN:  [id number]   Visit Number:  [id number]  Admission Date:  10/23/2020  Date Of Service:  20  Primary Care Physician:  Devika Foy APRN     LOS: 19 days :  Patient Care Team:  Devika Foy APRN as PCP - General (Family Medicine):    Chief Complaint:      Follow-up on renal failure, COVID-19    Subjective / Interval History:     Patient with out any significant change overnight per nursing staff.  He was seen and examined again with appropriate PPE per COVID-19 precautions today.  He specifically denied any concerns about his breathing today.  Has been eating and drinking somewhat better than the last couple of days.  No fevers or chills.  Still pleasantly confused at baseline.    Review of Systems:     General ROS: Patient denies any fevers, chills or loss of consciousness.  Respiratory ROS: Denies cough or shortness of breath.  Cardiovascular ROS: Denies chest pain or palpitations. No history of exertional chest pain.  Gastrointestinal ROS: Denies nausea and vomiting. Denies any abdominal pain. No diarrhea.  Neurological ROS: Denies any focal weakness. No loss of consciousness. Denies any numbness.  Dermatological ROS: Denies any redness or pruritis.    Vital Signs:    Temp:  [97.1 °F (36.2 °C)-98.8 °F (37.1 °C)] 97.5 °F (36.4 °C)  Heart Rate:  [72-86] 72  Resp:  [18] 18  BP: (110-117)/(66-76) 117/69    Intake and output:    I/O last 3 completed shifts:  In: 1000 [P.O.:1000]  Out: 250 [Urine:250]  I/O this shift:  In: 240 [P.O.:240]  Out: 100 [Urine:100]    Physical Examination:    General Appearance:  Alert and cooperative, not in any acute distress.  Frail elderly appearing gentleman.   Head:  Atraumatic and normocephalic, without obvious abnormality.   Eyes:          PERRLA, conjunctivae and sclerae normal, no Icterus. No pallor. Extraocular movements are within normal  limits.   Neck: Supple, trachea midline, no thyromegaly.   Lungs:   Breath sounds heard bilaterally equally.  No crackles or wheezing. No Pleural rub or bronchial breathing.   Heart:  Normal S1 and S2, no murmur, no gallop, no rub. No JVD   Abdomen:   Normal bowel sounds, no masses, no organomegaly. Soft, non-tender, non-distended, no guarding, no rebound tenderness.   Extremities: Moves all extremities well, no edema, no cyanosis, no clubbing.   Skin: No bleeding, bruising or rash.  No change to mild bruising of the abdominal wall.   Neurologic: Awake, alert pleasant confusion. Moves all 4 extremities equally.  Generalized weakness     Laboratory results:    Results from last 7 days   Lab Units 11/11/20  0825 11/10/20  0646 11/09/20 0623 11/05/20  0452   SODIUM mmol/L 146* 144 147*   < > 153*   POTASSIUM mmol/L 4.1 3.2* 3.1*   < > 3.8   CHLORIDE mmol/L 114* 114* 114*   < > 127*   CO2 mmol/L 26.1 23.7 22.8   < > 17.0*   BUN mg/dL 19 20 18   < > 75*   CREATININE mg/dL 0.98 1.03 1.13   < > 2.04*   CALCIUM mg/dL 6.9* 6.9* 6.8*   < > 6.7*   BILIRUBIN mg/dL  --   --   --   --  0.6   ALK PHOS U/L  --   --   --   --  58   ALT (SGPT) U/L  --   --   --   --  19   AST (SGOT) U/L  --   --   --   --  16   GLUCOSE mg/dL 101* 92 67   < > 139*    < > = values in this interval not displayed.     Results from last 7 days   Lab Units 11/11/20  0825 11/09/20  0623 11/08/20  0656 11/07/20  0641   WBC 10*3/mm3  --  9.84 9.71 11.12*   HEMOGLOBIN g/dL 9.2* 9.2* 8.9* 8.9*   HEMATOCRIT % 29.4* 28.9* 26.9* 28.3*   PLATELETS 10*3/mm3  --  106* 93* 107*         Results from last 7 days   Lab Units 11/05/20  0452   TROPONIN T ng/mL 0.038*               I have reviewed the patient's laboratory results.    Radiology results:    Imaging Results (Last 24 Hours)     ** No results found for the last 24 hours. **          I have reviewed the patient's radiology reports.    Medication Review:     I have reviewed the patients active and prn  medications.       Acute respiratory failure with hypoxia (CMS/[facility])    Hypertension    CAD (coronary artery disease)    GERD (gastroesophageal reflux disease)    COVID-19    Sepsis, unspecified organism (CMS/[facility])      Assessment:    1.  Acute hypoxic respiratory failure secondary to #2, present on admission.  2.  Patchy bilateral COVID-19 pneumonia, present on admission, improved.  3.  Acute renal failure, present on admission, not related to sepsis, resolved.  4.  Sepsis secondary to #2, present on admission, resolved.  5.  Septic shock developed on 11/3/2020, not present on admission, resolved.  6.  Suspected bilateral pyelonephritis, improved.  7.  Acute blood loss anemia likely secondary to upper GI bleeding, status post 3 units PRBC.  8.  Essential hypertension.  9.  Coronary artery disease.  10.  Gastroesophageal reflux disease.  11.  Suspected underlying dementia.    Plan:    Patient overall hemodynamically stable.  Hemoglobin is stable no evidence of further bleeding.  Encourage oral intake is much as possible.  Kidney function is stable.  Continue on vitamin supplementation.  Continue with aspirin.  As needed albuterol.  Continue Symbicort.    Per case management, patient will likely be able to be accepted at [place] [facility].  Anticipate this being tomorrow.    Magdalene Reis DO  11/11/20  14:05 EST    Dictated utilizing Dragon dictation.

## 2020-11-11 NOTE — PLAN OF CARE
Problem: Adult Inpatient Plan of Care  Goal: Plan of Care Review  Recent Flowsheet Documentation  Taken 11/11/2020 1521 by Elvia Red OT  Progress: improving  Plan of Care Reviewed With: patient  Outcome Summary: OT tx completed. Patient completed bed mobility and functional transfers x 5 with min A, completed grooming and UB AROM sitting EOB. Continue OT POC

## 2020-11-11 NOTE — THERAPY TREATMENT NOTE
Patient Name: Noe Martel  : 1935    MRN: [id number]                              Today's Date: 2020       Admit Date: 10/23/2020    Visit Dx:     ICD-10-CM ICD-9-CM   1. COVID-19  U07.1 079.89   2. Pneumonia of both lungs due to infectious organism, unspecified part of lung  J18.9 483.8   3. Acute on chronic respiratory failure with hypoxia (CMS/[facility])  J96.21 518.84     799.02   4. Acute renal failure, unspecified acute renal failure type (CMS/[facility])  N17.9 584.9   5. Transaminitis  R74.01 790.4   6. Acute respiratory failure with hypoxia (CMS/[facility])  J96.01 518.81   7. Chronic obstructive pulmonary disease, unspecified COPD type (CMS/[facility])  J44.9 496     Patient Active Problem List   Diagnosis   • Benign prostatic hyperplasia   • Hypertension   • CAD (coronary artery disease)   • Dyslipidemia   • Chronic back pain   • GERD (gastroesophageal reflux disease)   • COPD (chronic obstructive pulmonary disease) (CMS/[facility])   • COVID-19   • Acute respiratory failure with hypoxia (CMS/[facility])   • Sepsis, unspecified organism (CMS/[facility])     Past Medical History:   Diagnosis Date   • BPH (benign prostatic hyperplasia)    • CAD (coronary artery disease)     status post drug-eluting stent deployment in the ostium of the right coronary artery and balloon angioplasty of the obtuse marginal branch of the left circumflex, 2015.  Ejection fraction 60%.   • Chronic back pain    • COPD (chronic obstructive pulmonary disease) (CMS/[facility])    • Dyslipidemia      untreated.    • GERD (gastroesophageal reflux disease)    • Hyperlipidemia    • Hypertension    • Impaired functional mobility, balance, gait, and endurance      Past Surgical History:   Procedure Laterality Date   • CARDIAC SURGERY      STENT PLACEMENT   • CHOLECYSTECTOMY     • HERNIA REPAIR      bilateral     General Information     Row Name 20 1341          Physical Therapy Time and Intention    Document Type  therapy note (daily note)  -RM     Mode of  Treatment  physical therapy  -RM     Row Name 11/11/20 1341          General Information    Patient Profile Reviewed  yes  -RM     Existing Precautions/Restrictions  fall  -RM     Row Name 11/11/20 1341          Cognition    Orientation Status (Cognition)  oriented x 3  -RM     Row Name 11/11/20 1341          Safety Issues, Functional Mobility    Impairments Affecting Function (Mobility)  balance;endurance/activity tolerance;strength;shortness of breath  -RM       User Key  (r) = Recorded By, (t) = Taken By, (c) = Cosigned By    Initials Name Provider Type    Jerod Finley PTA Physical Therapy Assistant        Mobility     Row Name 11/11/20 1341          Bed Mobility    Supine-Sit [place] (Bed Mobility)  contact guard;minimum assist (75% patient effort);verbal cues  -RM     Sit-Supine [place] (Bed Mobility)  contact guard;minimum assist (75% patient effort);verbal cues  -RM     Assistive Device (Bed Mobility)  bed rails;head of bed elevated  -RM     Row Name 11/11/20 1341          Sit-Stand Transfer    Sit-Stand [place] (Transfers)  contact guard;minimum assist (75% patient effort);verbal cues  -RM     Assistive Device (Sit-Stand Transfers)  walker, front-wheeled  -RM     Row Name 11/11/20 1341          Gait/Stairs (Locomotion)    [place] Level (Gait)  minimum assist (75% patient effort);verbal cues;nonverbal cues (demo/gesture);contact guard  -RM     Assistive Device (Gait)  walker, front-wheeled  -RM     Distance in Feet (Gait)  24'  -RM     Deviations/Abnormal Patterns (Gait)  stride length decreased;gait speed decreased;base of support, wide  -RM     Bilateral Gait Deviations  forward flexed posture;heel strike decreased;weight shift ability decreased  -RM       User Key  (r) = Recorded By, (t) = Taken By, (c) = Cosigned By    Initials Name Provider Type    Jerod Finley, HALEY Physical Therapy Assistant        Obj/Interventions    No documentation.       Goals/Plan    No  documentation.       Clinical Impression     Row Name 11/11/20 134          Pain Scale: Numbers Pre/Post-Treatment    Pretreatment Pain Rating  0/10 - no pain  -RM     Posttreatment Pain Rating  0/10 - no pain  -RM     Row Name 11/11/20 1341          Plan of Care Review    Plan of Care Reviewed With  patient  -RM     Progress  improving  -RM     Outcome Summary  Pt tolerated treatment well. Pt required decreased assistance for bed mobility,transfers and gait this treatment. PT was able to advance gt distance to 24' cga/min a with rw. Pt was able to maintain O2 saturations at or above 95% on room air.  See flowsheet for details.  -RM     Row Name 11/11/20 1341          Positioning and Restraints    Pre-Treatment Position  in bed  -RM     Post Treatment Position  bed  -RM     In Bed  fowlers;call light within reach;encouraged to call for assist;exit alarm on;notified nsg  -RM       User Key  (r) = Recorded By, (t) = Taken By, (c) = Cosigned By    Initials Name Provider Type    Jerod Finley, HALEY Physical Therapy Assistant        Outcome Measures     Row Name 11/11/20 1481          How much help from another person do you currently need...    Turning from your back to your side while in flat bed without using bedrails?  3  -RM     Moving from lying on back to sitting on the side of a flat bed without bedrails?  3  -RM     Moving to and from a bed to a chair (including a wheelchair)?  3  -RM     Standing up from a chair using your arms (e.g., wheelchair, bedside chair)?  3  -RM     Climbing 3-5 steps with a railing?  2  -RM     To walk in hospital room?  3  -RM     AM-PAC 6 Clicks Score (PT)  17  -RM     Row Name 11/11/20 1342          Functional Assessment    Outcome Measure Options  AM-PAC 6 Clicks Basic Mobility (PT)  -RM       User Key  (r) = Recorded By, (t) = Taken By, (c) = Cosigned By    Initials Name Provider Type    Jerod Finley, HALEY Physical Therapy Assistant        Physical Therapy Education                  Title: PT OT SLP Therapies (Done)     Topic: Physical Therapy (Done)     Point: Mobility training (Done)     Learning Progress Summary           Patient Acceptance, E,TB,D, VU,NR by  at 11/11/2020 1345    Comment: safety with transfers    Acceptance, E,TB, VU by CC at 11/10/2020 1531    Acceptance, E,TB,D, VU,NR by  at 11/10/2020 1514    Comment: safety and walker positioning    Acceptance, E,TB,D, VU,NR by  at 11/9/2020 1205    Comment: safety with mobility    Acceptance, E, NR by TW at 11/8/2020 1518    Comment: Readiness is decreased due to pt not seeming to understand cues given to him this date. Pt education on LE ther ex technique and bed mobility.    Acceptance, E,D, NR by TW at 11/7/2020 1211    Comment: Pt education on LE ther ex technique and safety and technique with using rolling walker. Pt had difficulty focusing and will need reinforcement.    Acceptance, E,TB, VU by  at 11/6/2020 1339    Comment: Purpose of PT/POC; importance of consistent activity to improve functional status.    Acceptance, E,TB, VU by  at 11/5/2020 1503    Comment: Purpose of PT.    Acceptance, E,TB, VU by  at 11/2/2020 1534    Comment: Ther ex ; purpose of PT.    Acceptance, D,E, DU,NR by TW at 10/31/2020 1022    Comment: Pt education for LE ther ex technique with pt needing visual cues to perform correctly. Pt education also with rolling walker for transfer technique    Acceptance, E,TB, VU by  at 10/29/2020 1514    Comment: Purpose of PT; ther ex for HEP.    Acceptance, E,TB, VU by  at 10/28/2020 1600    Comment: Purpose of PT/POC.                   Point: Home exercise program (Done)     Learning Progress Summary           Patient Acceptance, E,TB, VU by CC at 11/10/2020 1531    Acceptance, E, NR by TW at 11/8/2020 1518    Comment: Readiness is decreased due to pt not seeming to understand cues given to him this date. Pt education on LE ther ex technique and bed mobility.    Acceptance, E,D, NR by  TW at 11/7/2020 1211    Comment: Pt education on LE ther ex technique and safety and technique with using rolling walker. Pt had difficulty focusing and will need reinforcement.    Acceptance, E,TB, VU by  at 11/6/2020 1339    Comment: Purpose of PT/POC; importance of consistent activity to improve functional status.    Acceptance, E,TB, VU by LM at 11/5/2020 1503    Comment: Purpose of PT.    Acceptance, E,TB, VU by LM at 11/2/2020 1534    Comment: Ther ex ; purpose of PT.    Acceptance, E,D, DU,NR by TW at 11/1/2020 1115    Comment: Pt education on LE ther ex technique. Pt falling asleep during session and will need reinforcement of ther ex.    Acceptance, D,E, DU,NR by TW at 10/31/2020 1022    Comment: Pt education for LE ther ex technique with pt needing visual cues to perform correctly. Pt education also with rolling walker for transfer technique    Acceptance, E,TB, VU by LM at 10/29/2020 1514    Comment: Purpose of PT; ther ex for HEP.    Acceptance, E,TB, VU by  at 10/28/2020 1600    Comment: Purpose of PT/POC.                   Point: Precautions (Done)     Learning Progress Summary           Patient Acceptance, E,TB, VU by CC at 11/10/2020 1531    Acceptance, E,TB, VU by  at 11/6/2020 1339    Comment: Purpose of PT/POC; importance of consistent activity to improve functional status.    Acceptance, E,TB, VU by  at 11/5/2020 1503    Comment: Purpose of PT.    Acceptance, E,TB, VU by  at 11/2/2020 1534    Comment: Ther ex ; purpose of PT.    Acceptance, E,TB, VU by  at 10/29/2020 1514    Comment: Purpose of PT; ther ex for HEP.    Acceptance, E,TB, VU by  at 10/28/2020 1600    Comment: Purpose of PT/POC.                               User Key     Initials Effective Dates Name Provider Type Discipline    CC 10/26/16 -  Shavonne Pastor, RN Registered Nurse Nurse     04/03/18 -  Jeni Gracia, PT Physical Therapist PT     03/07/18 -  Jerod Bell, PTA Physical Therapy Assistant PT     TW 03/26/19 -  Angela Mcelroy, PT Physical Therapist PT              PT Recommendation and Plan     Plan of Care Reviewed With: patient  Progress: improving  Outcome Summary: Pt tolerated treatment well. Pt required decreased assistance for bed mobility,transfers and gait this treatment. PT was able to advance gt distance to 24' cga/min a with rw. Pt was able to maintain O2 saturations at or above 95% on room air.  See flowsheet for details.     Time Calculation:   PT Charges     Row Name 11/11/20 1346             Time Calculation    Start Time  1142  -RM      Stop Time  1201  -RM      Time Calculation (min)  19 min  -RM      PT Received On  11/11/20  -RM      PT Goal Re-Cert Due Date  11/15/20  -RM         Time Calculation- PT    Total Timed Code Minutes- PT  19 minute(s)  -RM         Timed Charges    20884 - Gait Training Minutes   19  -RM        User Key  (r) = Recorded By, (t) = Taken By, (c) = Cosigned By    Initials Name Provider Type    Jerod Finley, PTA Physical Therapy Assistant        Therapy Charges for Today     Code Description Service Date Service Provider Modifiers Qty    33456399259 HC GAIT TRAINING EA 15 MIN 11/10/2020 Jerod Bell, PTA GP 1    42995024420 HC PT THERAPEUTIC ACT EA 15 MIN 11/10/2020 Jerod Bell, PTA GP 1    54063575000 HC GAIT TRAINING EA 15 MIN 11/11/2020 Jerod Bell, PTA GP 1          PT G-Codes  Outcome Measure Options: AM-PAC 6 Clicks Basic Mobility (PT)  AM-PAC 6 Clicks Score (PT): 17  AM-PAC 6 Clicks Score (OT): 14    Jerod Bell PTA  11/11/2020

## 2020-11-11 NOTE — PLAN OF CARE
Goal Outcome Evaluation:  Plan of Care Reviewed With: patient  Progress: improving  Outcome Summary: VSS.  Pt set up in bed most of the day.  Plan is to d/c to rehab tomorrow.  Will conitnue to monitor.

## 2020-11-11 NOTE — PROGRESS NOTES
Continued Stay Note   [place]     Patient Name: Noe Martel  MRN: [id number]  Today's Date: 11/11/2020    Admit Date: 10/23/2020    Discharge Plan     Row Name 11/11/20 1352       Plan    Plan  spoke with Malaika at [facility] and she stated recieved my message this am that Medicare Part A is the primary; she is checking with her manager to see if any other needs to possibly accept tomorrow, also does not need a repeat covid just a form completed that she will fax along with first positive proof  14:25 EST   Called wife/son and obtained Medicare Part A #[id number], date obtained 3/1/2001; son stated if comes in will bring to put into our system; called Malaika and gave information   15:16 EST  Malaika returned called and stated can accept patient tomorrow as long they do not receive positive testing issues in their own facility and are not allowed to accept; feels comfortable saying yes to bed but wants us to call her in am to confirm; also will still need copy positive covid and form she is faxing; informed her we still have not received; stated sent twice but will send again; informed md  15:53 EST  Faxed required information/form and positive covid screening to [facility]/Malaika; confirmation received          Discharge Codes    No documentation.       Expected Discharge Date and Time     Expected Discharge Date Expected Discharge Time    Nov 11, 2020             Annabel Morin RN

## 2020-11-11 NOTE — PLAN OF CARE
Problem: Adult Inpatient Plan of Care  Goal: Plan of Care Review  Recent Flowsheet Documentation  Taken 11/11/2020 1343 by Jerod Bell, PTA  Progress: improving  Plan of Care Reviewed With: patient  Outcome Summary: Pt tolerated treatment well. Pt required decreased assistance for bed mobility,transfers and gait this treatment. PT was able to advance gt distance to 24' cga/min a with rw. Pt was able to maintain O2 saturations at or above 95% on room air.  See flowsheet for details.

## 2020-11-11 NOTE — THERAPY TREATMENT NOTE
Patient Name: Noe Martel  : 1935    MRN: [id number]                              Today's Date: 2020       Admit Date: 10/23/2020    Visit Dx:     ICD-10-CM ICD-9-CM   1. COVID-19  U07.1 079.89   2. Pneumonia of both lungs due to infectious organism, unspecified part of lung  J18.9 483.8   3. Acute on chronic respiratory failure with hypoxia (CMS/[facility])  J96.21 518.84     799.02   4. Acute renal failure, unspecified acute renal failure type (CMS/[facility])  N17.9 584.9   5. Transaminitis  R74.01 790.4   6. Acute respiratory failure with hypoxia (CMS/[facility])  J96.01 518.81   7. Chronic obstructive pulmonary disease, unspecified COPD type (CMS/[facility])  J44.9 496     Patient Active Problem List   Diagnosis   • Benign prostatic hyperplasia   • Hypertension   • CAD (coronary artery disease)   • Dyslipidemia   • Chronic back pain   • GERD (gastroesophageal reflux disease)   • COPD (chronic obstructive pulmonary disease) (CMS/HCC)   • COVID-19   • Acute respiratory failure with hypoxia (CMS/[facility])   • Sepsis, unspecified organism (CMS/[facility])     Past Medical History:   Diagnosis Date   • BPH (benign prostatic hyperplasia)    • CAD (coronary artery disease)     status post drug-eluting stent deployment in the ostium of the right coronary artery and balloon angioplasty of the obtuse marginal branch of the left circumflex, 2015.  Ejection fraction 60%.   • Chronic back pain    • COPD (chronic obstructive pulmonary disease) (CMS/[facility])    • Dyslipidemia      untreated.    • GERD (gastroesophageal reflux disease)    • Hyperlipidemia    • Hypertension    • Impaired functional mobility, balance, gait, and endurance      Past Surgical History:   Procedure Laterality Date   • CARDIAC SURGERY      STENT PLACEMENT   • CHOLECYSTECTOMY     • HERNIA REPAIR      bilateral     General Information     Row Name 20 1516          OT Time and Intention    Document Type  therapy note (daily note)  -SD     Mode of Treatment   occupational therapy  -SD       User Key  (r) = Recorded By, (t) = Taken By, (c) = Cosigned By    Initials Name Provider Type    Elvia Garcia OT Occupational Therapist        Mobility/ADL's     Row Name 11/11/20 1517          Bed Mobility    Supine-Sit [place] (Bed Mobility)  minimum assist (75% patient effort)  -SD     Sit-Supine [place] (Bed Mobility)  minimum assist (75% patient effort)  -SD     Assistive Device (Bed Mobility)  bed rails;head of bed elevated  -SD     Row Name 11/11/20 1517          Transfers    Transfers  sit-stand transfer  -SD     Comment (Transfers)  x 5 along EOB  -SD     Sit-Stand [place] (Transfers)  minimum assist (75% patient effort)  -SD     Row Name 11/11/20 1517          Sit-Stand Transfer    Assistive Device (Sit-Stand Transfers)  walker, front-wheeled  -SD     Row Name 11/11/20 1517          Grooming Assessment/Training    [place] Level (Grooming)  wash face, hands;set up  -SD     Position (Grooming)  edge of bed sitting  -SD       User Key  (r) = Recorded By, (t) = Taken By, (c) = Cosigned By    Initials Name Provider Type    Elvia Garcia OT Occupational Therapist        Obj/Interventions     Row Name 11/11/20 1520          Shoulder (Therapeutic Exercise)    Shoulder (Therapeutic Exercise)  AROM (active range of motion)  -[facility] Name 11/11/20 1520          Elbow/Forearm (Therapeutic Exercise)    Elbow/Forearm (Therapeutic Exercise)  AROM (active range of motion)  -[facility] Name 11/11/20 1520          Therapeutic Exercise    Therapeutic Exercise  shoulder;elbow/forearm  -SD       User Key  (r) = Recorded By, (t) = Taken By, (c) = Cosigned By    Initials Name Provider Type    Elvia Garcia OT Occupational Therapist        Goals/Plan    No documentation.       Clinical Impression     Row Name 11/11/20 1521          Pain Scale: Numbers Pre/Post-Treatment    Pretreatment Pain Rating  0/10 - no pain  -SD     Posttreatment Pain Rating  0/10  - no pain  -SD     Row Name 11/11/20 1521          Plan of Care Review    Plan of Care Reviewed With  patient  -SD     Progress  improving  -SD     Outcome Summary  OT tx completed. Patient completed bed mobility and functional transfers x 5 with min A, completed grooming and UB AROM sitting EOB. Continue OT POC  -SD     Row Name 11/11/20 1521          Vital Signs    O2 Delivery Pre Treatment  room air  -SD     O2 Delivery Intra Treatment  room air  -SD     O2 Delivery Post Treatment  room air  -SD     Row Name 11/11/20 1521          Positioning and Restraints    Pre-Treatment Position  in bed  -SD     Post Treatment Position  bed  -SD     In Bed  side lying left;call light within reach;encouraged to call for assist;exit alarm on  -SD       User Key  (r) = Recorded By, (t) = Taken By, (c) = Cosigned By    Initials Name Provider Type    Elvia Garcia OT Occupational Therapist        Outcome Measures     Row Name 11/11/20 1522          How much help from another is currently needed...    Putting on and taking off regular lower body clothing?  2  -SD     Bathing (including washing, rinsing, and drying)  2  -SD     Toileting (which includes using toilet bed pan or urinal)  2  -SD     Putting on and taking off regular upper body clothing  3  -SD     Taking care of personal grooming (such as brushing teeth)  3  -SD     Eating meals  3  -SD     AM-PAC 6 Clicks Score (OT)  15  -SD     Row Name 11/11/20 1522          Functional Assessment    Outcome Measure Options  AM-PAC 6 Clicks Daily Activity (OT)  -SD       User Key  (r) = Recorded By, (t) = Taken By, (c) = Cosigned By    Initials Name Provider Type    Elvia Garcia OT Occupational Therapist        Occupational Therapy Education                 Title: PT OT SLP Therapies (Done)     Topic: Occupational Therapy (Done)     Point: ADL training (Done)     Description:   Instruct learner(s) on proper safety adaptation and remediation techniques during self care  or transfers.   Instruct in proper use of assistive devices.              Learning Progress Summary           Patient Acceptance, E,TB, VU by SD at 11/11/2020 1523    Comment: Safety and sequencing during functional tf    Acceptance, E,TB, VU by CC at 11/10/2020 1531    Acceptance, E,TB, VU by SD at 11/9/2020 1536    Comment: Safety and sequencing during functional tf and mobility    Acceptance, E,TB, VU by SD at 11/6/2020 1429    Comment: Benefit of OT; OT POC                   Point: Home exercise program (Done)     Description:   Instruct learner(s) on appropriate technique for monitoring, assisting and/or progressing therapeutic exercises/activities.              Learning Progress Summary           Patient Acceptance, E,TB, VU by  at 11/10/2020 1531    Acceptance, E,TB, VU by SD at 11/10/2020 1403    Comment: Benefit of UB ther ex                   Point: Precautions (Done)     Description:   Instruct learner(s) on prescribed precautions during self-care and functional transfers.              Learning Progress Summary           Patient Acceptance, E,TB, VU by  at 11/10/2020 1531                   Point: Body mechanics (Done)     Description:   Instruct learner(s) on proper positioning and spine alignment during self-care, functional mobility activities and/or exercises.              Learning Progress Summary           Patient Acceptance, E,TB, VU by  at 11/10/2020 1531                               User Key     Initials Effective Dates Name Provider Type Discipline     10/26/16 -  Shavonne Pastor RN Registered Nurse Nurse    SD 03/07/18 -  Elvia Red OT Occupational Therapist OT              OT Recommendation and Plan  Planned Therapy Interventions (OT): activity tolerance training, adaptive equipment training, BADL retraining, patient/caregiver education/training, strengthening exercise, transfer/mobility retraining  Therapy Frequency (OT): 3 times/wk(5 times if indicated)  Plan of Care  Review  Plan of Care Reviewed With: patient  Progress: improving  Outcome Summary: OT tx completed. Patient completed bed mobility and functional transfers x 5 with min A, completed grooming and UB AROM sitting EOB. Continue OT POC     Time Calculation:   Time Calculation- OT     Row Name 11/11/20 1524 11/11/20 1346          Time Calculation- OT    OT Start Time  1404  -SD  --     OT Stop Time  1430  -SD  --     OT Time Calculation (min)  26 min  -SD  --     OT Received On  11/11/20  -SD  --     OT Goal Re-Cert Due Date  11/16/20  -SD  --        Timed Charges    47958 - OT Therapeutic Exercise Minutes  10  -SD  --     79310 - Gait Training Minutes   --  19  -RM     66791 - OT Therapeutic Activity Minutes  16  -SD  --       User Key  (r) = Recorded By, (t) = Taken By, (c) = Cosigned By    Initials Name Provider Type    Jerod Finley, PTA Physical Therapy Assistant    SD Elvia Red, OT Occupational Therapist        Therapy Charges for Today     Code Description Service Date Service Provider Modifiers Qty    81088748918 HC OT THER PROC EA 15 MIN 11/10/2020 Elvia Red OT GO 2    10256519795 HC OT THER PROC EA 15 MIN 11/11/2020 Elvia Red, OT GO 1    12912507835 HC OT THERAPEUTIC ACT EA 15 MIN 11/11/2020 Elvia Red OT GO 1               Elvia Red OT  11/11/2020

## 2020-11-12 VITALS
HEIGHT: 69 IN | RESPIRATION RATE: 18 BRPM | SYSTOLIC BLOOD PRESSURE: 119 MMHG | WEIGHT: 169.09 LBS | BODY MASS INDEX: 25.04 KG/M2 | HEART RATE: 77 BPM | DIASTOLIC BLOOD PRESSURE: 74 MMHG | OXYGEN SATURATION: 98 % | TEMPERATURE: 98.1 F

## 2020-11-12 PROCEDURE — 99239 HOSP IP/OBS DSCHRG MGMT >30: CPT | Performed by: FAMILY MEDICINE

## 2020-11-12 RX ORDER — FERROUS SULFATE 300 MG/5ML
300 LIQUID (ML) ORAL 2 TIMES DAILY
Start: 2020-11-12

## 2020-11-12 RX ORDER — QUETIAPINE FUMARATE 50 MG/1
50 TABLET, FILM COATED ORAL NIGHTLY
Start: 2020-11-12

## 2020-11-12 RX ORDER — FOLIC ACID 1 MG/1
1 TABLET ORAL DAILY
Start: 2020-11-13

## 2020-11-12 RX ORDER — PANTOPRAZOLE SODIUM 40 MG/1
40 TABLET, DELAYED RELEASE ORAL DAILY
Start: 2020-11-12

## 2020-11-12 RX ADMIN — PANTOPRAZOLE SODIUM 40 MG: 40 TABLET, DELAYED RELEASE ORAL at 06:45

## 2020-11-12 RX ADMIN — Medication 30 ML: at 09:07

## 2020-11-12 RX ADMIN — MULTIPLE VITAMINS W/ MINERALS TAB 1 TABLET: TAB at 09:07

## 2020-11-12 RX ADMIN — BUDESONIDE AND FORMOTEROL FUMARATE DIHYDRATE 2 PUFF: 160; 4.5 AEROSOL RESPIRATORY (INHALATION) at 09:08

## 2020-11-12 RX ADMIN — ASPIRIN 81 MG CHEWABLE TABLET 81 MG: 81 TABLET CHEWABLE at 09:07

## 2020-11-12 RX ADMIN — MINERAL SUPPLEMENT IRON 300 MG / 5 ML STRENGTH LIQUID 100 PER BOX UNFLAVORED 300 MG: at 09:07

## 2020-11-12 RX ADMIN — FOLIC ACID 1 MG: 1 TABLET ORAL at 09:07

## 2020-11-12 NOTE — PROGRESS NOTES
Discharge Planning Assessment  [facility]     Patient Name: Noe Martel  MRN: [id number]  Today's Date: 11/12/2020    Admit Date: 10/23/2020    Discharge Needs Assessment    No documentation.       Discharge Plan     Row Name 11/12/20 1123       Plan    Plan Comments  Sons states he will trnasport to [facility]    three days of [place] hard faxed to [facility] - Admitted Since 10/23/2020     Destination Coordination complete    Service Provider Request Status Selected Services Address Phone Fax    [facility]   Selected Skilled Nursing [address] [address] [phone]    [facility]  Pending - Request Sent N/A [address] [facility] [address] [phone]    [facility] SWING BED UNIT  Declined  unable to accept covid patients N/A [address] [address] [phone]              Expected Discharge Date and Time     Expected Discharge Date Expected Discharge Time    Nov 12, 2020         Demographic Summary    No documentation.       Functional Status    No documentation.       Psychosocial    No documentation.       Abuse/Neglect    No documentation.       Legal    No documentation.       Substance Abuse    No documentation.       Patient Forms    No documentation.           Angeline Mloina RN

## 2020-11-12 NOTE — PROGRESS NOTES
Case Management Discharge Note      Final Note: [facility]    Provided Post Acute Provider List?: N/A  N/A Provider List Comment: [facility].    Selected Continued Care - Admitted Since 10/23/2020     Destination Coordination complete    Service Provider Selected Services Address Phone Fax    [facility]  Skilled Nursing [address] [address] [phone]          Durable Medical Equipment    No services have been selected for the patient.              Dialysis/Infusion    No services have been selected for the patient.              Home Medical Care    No services have been selected for the patient.              Therapy    No services have been selected for the patient.              Community Resources    No services have been selected for the patient.                  Transportation Services  Private: Car    Final Discharge Disposition Code: 03 - skilled nursing facility (SNF)

## 2020-11-12 NOTE — DISCHARGE SUMMARY
[facility]   DISCHARGE SUMMARY      Name:  Noe Martel   Age:  85 y.o.  Sex:  male  :  1935  MRN:  [id number]   Visit Number:  [id number]    Admission Date:  10/23/2020  Date of Discharge:  2020  Primary Care Physician:  Devika Foy APRN    Important issues to note:    Discharge to [facility]    Discharge Diagnoses:     1.  Acute hypoxic respiratory failure secondary to #2, present on admission.  2.  Patchy bilateral COVID-19 pneumonia, present on admission, improved.  3.  Acute renal failure, present on admission, not related to sepsis, resolved.  4.  Sepsis secondary to #2, present on admission, resolved.  5.  Septic shock developed on 11/3/2020, not present on admission, resolved.  6.  Suspected bilateral pyelonephritis, improved.  7.  Acute blood loss anemia likely secondary to upper GI bleeding, status post 3 units PRBC.  8.  Essential hypertension.  9.  Coronary artery disease.  10.  Gastroesophageal reflux disease.  11.  Suspected underlying dementia.         Acute respiratory failure with hypoxia (CMS/HCC)    Hypertension    CAD (coronary artery disease)    GERD (gastroesophageal reflux disease)    COVID-19    Sepsis, unspecified organism (CMS/[facility])      Presenting Problem:    COVID-19 [U07.1]     Consults:     Consults     Date and Time Order Name Status Description    2020 0112 Inpatient General Surgery Consult Completed     10/23/2020 1907 Inpatient Nephrology Consult Completed         Consulting Physician(s)             None            Procedures Performed:           History of presenting illness:    No acute changes. Doing well. Mental status at baseline. Poor appetite overall. Family requesting short term rehab, will be going to [facility] today.    Hospital Course:    84 y/o gentlemen who had presented on 10/23/2020 with complaints of weakness and shortness of breath. Medical history of COPD, HTN, CAD, likely  dementia. Had been exposed to son with COVID. He was found to be hypoxic in ER and placed on BIPAP. Was Covid positive. He was admitted to ICU and placed on rocephin and levaquin as well as dexamethasone and remdesevir. He did receive gentle IV fluids due to renal failure and improvement was seen. Head CT without acute abnormality.     He was improving and moved out to [facility] for further care. Completed course of rocephin and levaquin as well as remdesevir. He was seen by PT/OT and case management, plan for discharge to rehab once available after discussion with family. He actually had been planned for discharge on 11/3/2020, but developed hypotension with evidence of sepsis/shock and was moved back to ICU. Did have evidence of GI bleed and underwent 3 units prbcs transfusion and was stabilized. He was on levophed at one point and was able to be weaned down. Was placed back on empiric antibiotics and did have evidence of UTI and was treated with oral augmentin after culture returned.    General surgery did consult on patient, felt not appropriate for any EGD/colonoscopy and was held. He did improve with no further evidence of bleeding and was moved back to [facility]. He underwent further PT/OT and diet advanced. He appears significantly weak, but vitals are improved and labs are stable. Will be discharged to rehab at this time. Will f/u with PCP. Consider GI/general surgery consult in future if any further bleeding issues.     Vital Signs:    Temp:  [97 °F (36.1 °C)-98.4 °F (36.9 °C)] 98.1 °F (36.7 °C)  Heart Rate:  [77-83] 77  Resp:  [18] 18  BP: (112-129)/(71-77) 119/74    Physical Exam:    General Appearance:  Alert and cooperative, not in any acute distress. Frail appearing   Head:  Atraumatic and normocephalic, without obvious abnormality.   Eyes:          PERRLA, conjunctivae and sclerae normal, no icterus. No pallor. Extraocular movements are within normal limits.   Ears:  Ears appear intact with no  abnormalities noted.   Throat: No oral lesions, no thrush, oral mucosa moist.   Neck: Supple, trachea midline, no thyromegaly, no carotid bruit.   Back:   No kyphoscoliosis present. No tenderness to palpation,   range of motion normal.   Lungs:   Chest shape is normal. Breath sounds heard bilaterally equally.  No crackles or wheezing. No Pleural rub or bronchial breathing.   Heart:  Normal S1 and S2, no murmur, no gallop, no rub. No JVD.   Abdomen:   Normal bowel sounds, no masses, no organomegaly. Soft, nontender, nondistended, no guarding, no rebound tenderness.   Extremities: Moves all extremities, no edema, no cyanosis, no clubbing.   Pulses: Pulses palpable and equal bilaterally.   Skin: No bleeding, bruising or rash.   Neurologic: Alert, oriented to place. Moves all four limbs equally. No tremors. No facial asymmetry.     Pertinent Lab Results:     Results from last 7 days   Lab Units 11/11/20  0825 11/10/20  0646 11/09/20  0623   SODIUM mmol/L 146* 144 147*   POTASSIUM mmol/L 4.1 3.2* 3.1*   CHLORIDE mmol/L 114* 114* 114*   CO2 mmol/L 26.1 23.7 22.8   BUN mg/dL 19 20 18   CREATININE mg/dL 0.98 1.03 1.13   CALCIUM mg/dL 6.9* 6.9* 6.8*   GLUCOSE mg/dL 101* 92 67     Results from last 7 days   Lab Units 11/11/20  0825 11/09/20  0623 11/08/20  0656 11/07/20  0641   WBC 10*3/mm3  --  9.84 9.71 11.12*   HEMOGLOBIN g/dL 9.2* 9.2* 8.9* 8.9*   HEMATOCRIT % 29.4* 28.9* 26.9* 28.3*   PLATELETS 10*3/mm3  --  106* 93* 107*                                   Invalid input(s): USDES,  BLOODU, NITRITITE, BACT, EP  Pain Management Panel     There is no flowsheet data to display.              Pertinent Radiology Results:    Imaging Results (All)     Procedure Component Value Units Date/Time    CT Abdomen Pelvis Without Contrast [[id number]] Collected: 11/03/20 2344     Updated: 11/03/20 2345    Narrative:      FINAL REPORT    TECHNIQUE:  Axial images through the abdomen and pelvis were performed  without contrast. This study was  performed with techniques to  keep radiation doses as low as reasonably achievable, (ALARA).  Individualized dose reduction techniques using automated  exposure control or adjustment of mA and/or kV according to the  patient's size were employed.    CLINICAL HISTORY:  diarrhea    FINDINGS:  ABDOMEN: Imaging of the lungs demonstrate an 8mm nodule in the  medial right lower lobe, a 5 mm lingular nodule and a 10 mm  nodule in the right lower lobe.  There is bibasilar atelectasis.  The heart size is normal.  Patient is status post  cholecystectomy.  Limited images of the liver are unremarkable.  The spleen is normal.  No adrenal mass is identified.  There is  mild peripancreatic inflammatory change.  The aorta is normal in  caliber.  There is no significant free fluid or adenopathy.  There is bilateral perinephric stranding. There is no  nephrolithiasis.  There is no hydronephrosis.  PELVIS: The  appendix is not identified.  There are diverticula of the  sigmoid colon without evidence of diverticulitis.  Fecal  impaction is seen at the rectal vault. The urinary bladder is  unremarkable.  There is no significant free fluid or adenopathy.      Impression:      Multiple bilateral pulmonary nodules.  Recommend three-month  followup chest CT.  Questionable, mild pancreatitis.  Fecal  impaction at the rectal vault.    Authenticated by Anastacio Bridges MD on 11/03/2020 11:44:10 PM    XR Chest 1 View [[id number]] Collected: 11/03/20 1552     Updated: 11/03/20 1600    Narrative:      PROCEDURE: XR CHEST 1 VW-     HISTORY: Leukocytosis; U07.1-COVID-19; J18.9-Pneumonia, unspecified  organism; J96.21-Acute and chronic respiratory failure with hypoxia;  N17.9-Acute kidney failure, unspecified; R74.01-Elevation of levels of  liver transaminase levels; J96.01-Acute respiratory failure with  hypoxia; J44.9-Chronic obstructive pulmonary disease, unspecified     COMPARISON: 10/28/2020.     FINDINGS: The heart is normal in size. The  mediastinum is unremarkable.  Opacities in the right greater than left lung apices are unchanged..  There are no effusions. There is no pneumothorax.  There are no acute  osseous abnormalities.       Impression:      Right greater than left apical opacities which are  unchanged.     Continued followup is recommended.     This report was finalized on 11/3/2020 3:58 PM by Jolene Stratton M.D..    CT Head Without Contrast [[id number]] Collected: 10/28/20 1847     Updated: 10/28/20 1848    Narrative:      FINAL REPORT    TECHNIQUE:  Routine axial images through the head were obtained without  contrast.    CLINICAL HISTORY:  Mental status change, unknown cause    FINDINGS:  The ventricles are dilated, proportionate to the degree of  generalized atrophy.  Periventricular and deep white matter  low-attenuation areas are consistent with chronic small vessel  ischemia.  There is no mass or shift in midline structures.  There is no intracranial hemorrhage.  There is no acute sinus or  osseous abnormality.      Impression:      No acute intracranial abnormality.    Authenticated by Ricky St M.D. on 10/28/2020 06:47:11 PM    XR Chest 1 View [[id number]] Collected: 10/28/20 0757     Updated: 10/28/20 0800    Narrative:      PORTABLE CHEST     INDICATION: Pneumonia.     FINDINGS: Single frontal portable chest. Comparison with 10/23/2020. EKG  leads overlie the chest. Cardiac silhouette is unchanged. Scattered  vascular calcifications. Degenerative changes in the spine and  shoulders. No pneumothorax. Bilateral parenchymal infiltrates appear  slightly improved. No other interval change. Gaseous distention of bowel  in the right upper abdomen.       Impression:      Slight improvement in aeration. Continued follow-up  recommended.     This report was finalized on 10/28/2020 7:58 AM by Jeffery Zaidi MD.    XR Chest 1 View [[id number]] Collected: 10/24/20 0831     Updated: 10/24/20 0834    Narrative:      PROCEDURE: XR CHEST 1 VW-      INDICATION:  resp distress     FINDINGS:  A portable view of the chest was obtained.  There is no prior  exam for comparison.  Heart size is normal. Lung volumes are low. There  are patchy, upper lobe predominant, left greater than right bilateral  opacities concerning for bilateral pneumonia. There is no pneumothorax.       Impression:      Left greater than right patchy opacities concerning for  bilateral pneumonia. Recommend radiographic follow-up.     This report was finalized on 10/24/2020 8:31 AM by Montserrat Castano MD.    CT Chest Without Contrast [[id number]] Collected: 10/23/20 1752     Updated: 10/23/20 1753    Narrative:      FINAL REPORT    TECHNIQUE:  Axial imaging of the chest was obtained without contrast.  Reformatted images were also obtained and reviewed.This study  was performed with techniques to keep radiation doses as low as  reasonably achievable, (ALARA). Individualized dose reduction  technique using automated exposure control or adjustment of mA  and/or kV according to the patient's size were employed.    CLINICAL HISTORY:  + covid    FINDINGS:  There is no axillary adenopathy. There is no hilar or  mediastinal mass or adenopathy. Heart size is normal. There is  no pericardial or pleural effusion. Limited images of the upper  abdomen are unremarkable.  There is left lower lobe  consolidation.  Patchy ground glass opacities are seen within  the upper lobes.      Impression:      Multifocal pneumonia which may be viral or bacterial    Authenticated by REBECCA West MD on 10/23/2020 05:52:27 PM    CT Head Without Contrast [[id number]] Collected: 10/23/20 1743     Updated: 10/23/20 1744    Narrative:      FINAL REPORT    TECHNIQUE:  Axial images were performed through the brain.This study was  performed with techniques to keep radiation doses as low as  reasonably achievable, (ALARA). Individualized dose reduction  techniques using automated exposure control or adjustment of mA  and/or kV  according to the patient''s size were employed.    CLINICAL HISTORY:  aphasia, difficulty walking    FINDINGS:  There is advanced global atrophy and chronic microvascular  ischemic change.   The ventricles are normal in size for the  degree of atrophy.  There is no extra-axial fluid or midline  shift.  There is no evidence of acute hemorrhage or mass.      Impression:      Atrophy.  No acute intracranial process.    Authenticated by REBECCA West MD on 10/23/2020 05:43:38 PM          Echo:    Results for orders placed during the hospital encounter of 10/23/20   Adult Transthoracic Echo Complete W/ Cont if Necessary Per Protocol    Narrative 1.  Technically difficult study.  2.  Grossly normal LV systolic function, LVEF 55-60%.  3.  Mild concentric LVH.  4.  RV/RA not well visualized.  5.  Mild to moderate left atrial dilation.  6.  No significant valvular abnormalities.       Condition on Discharge:      Stable.    Code status during the hospital stay:    Code Status and Medical Interventions:   Ordered at: 10/23/20 1852     Code Status:    CPR     Medical Interventions (Level of Support Prior to Arrest):    Full       Discharge Disposition:    Rehab Facility or Unit (DC - External)    Discharge Medications:       Discharge Medications      New Medications      Instructions Start Date   ferrous sulfate 300 (60 Fe) MG/5ML syrup   300 mg, Oral, 2 Times Daily      folic acid 1 MG tablet  Commonly known as: FOLVITE   1 mg, Oral, Daily   Start Date: November 13, 2020     pantoprazole 40 MG EC tablet  Commonly known as: PROTONIX   40 mg, Oral, Daily      QUEtiapine 50 MG tablet  Commonly known as: SEROquel   50 mg, Oral, Nightly         Continue These Medications      Instructions Start Date   acetaminophen 325 MG tablet  Commonly known as: TYLENOL   650 mg, Oral, Every 6 Hours PRN      albuterol sulfate  (90 Base) MCG/ACT inhaler  Commonly known as: PROVENTIL HFA;VENTOLIN HFA;PROAIR HFA   2 puffs, Inhalation,  Every 4 Hours PRN      aspirin 81 MG chewable tablet   Take 81 mg by mouth daily.        budesonide-formoterol 160-4.5 MCG/ACT inhaler  Commonly known as: SYMBICORT   2 puffs, Inhalation, 2 Times Daily - RT      cyanocobalamin 1000 MCG/ML injection   1,000 mcg, Intramuscular, Once, Due on October 28       montelukast 10 MG tablet  Commonly known as: SINGULAIR   10 mg, Oral, Nightly      Nitrostat 0.4 MG SL tablet  Generic drug: nitroglycerin   No dose, route, or frequency recorded.      terazosin 5 MG capsule  Commonly known as: HYTRIN   5 mg, Oral, Nightly         Stop These Medications    amLODIPine 10 MG tablet  Commonly known as: NORVASC     lisinopril 40 MG tablet  Commonly known as: PRINIVIL,ZESTRIL     omeprazole 20 MG capsule  Commonly known as: priLOSEC            Discharge Diet:     Diet Instructions     Diet: Dysphagia; Nectar / Syrup Thick Liquids; Pureed      Discharge Diet: Dysphagia    Fluid Consistency: Nectar / Syrup Thick Liquids    Pureed Options: Pureed          Activity at Discharge:     Activity Instructions     Activity as Tolerated            Follow-up Appointments:     Contact information for follow-up providers     Devika Foy APRN Follow up in 1 week(s).    Specialty: Family Medicine  Contact information:  [address] [place] [address]  [phone]                   Contact information for after-discharge care     Destination     [facility] .    Service: Skilled Nursing  Contact information:  [address] [place] [id number] [phone]                             No future appointments.        Test Results Pending at Discharge:    Pending Labs     Order Current Status    Green Top (No Gel) In process    Randle Draw In process             Magdalene Reis DO  11/12/20  13:33 EST    Time spent: 45 minutes    Dictated utilizing Dragon dictation.

## 2020-11-12 NOTE — PLAN OF CARE
Goal Outcome Evaluation:  Plan of Care Reviewed With: patient  Progress: improving  Outcome Summary: VSS; no overnight events; plan for DC to rehab in am

## 2020-12-14 ENCOUNTER — HOSPITAL ENCOUNTER (OUTPATIENT)
Facility: HOSPITAL | Age: 85
Discharge: HOME | End: 2020-12-14
Payer: MEDICARE

## 2020-12-14 LAB
A/G RATIO: 1.2 (ref 0.8–2)
ALBUMIN SERPL-MCNC: 2.8 G/DL (ref 3.4–4.8)
ALP BLD-CCNC: 73 U/L (ref 25–100)
ALT SERPL-CCNC: 14 U/L (ref 4–36)
ANION GAP SERPL CALCULATED.3IONS-SCNC: 11 MMOL/L (ref 3–16)
AST SERPL-CCNC: 18 U/L (ref 8–33)
BILIRUB SERPL-MCNC: <0.2 MG/DL (ref 0.3–1.2)
BUN BLDV-MCNC: 15 MG/DL (ref 6–20)
CALCIUM SERPL-MCNC: 7.7 MG/DL (ref 8.5–10.5)
CHLORIDE BLD-SCNC: 104 MMOL/L (ref 98–107)
CO2: 27 MMOL/L (ref 20–30)
CREAT SERPL-MCNC: 1 MG/DL (ref 0.4–1.2)
FERRITIN: 385.3 NG/ML (ref 22–322)
FOLATE: >20 NG/ML
GFR AFRICAN AMERICAN: >59
GFR NON-AFRICAN AMERICAN: >59
GLOBULIN: 2.3 G/DL
GLUCOSE BLD-MCNC: 117 MG/DL (ref 74–106)
HCT VFR BLD CALC: 32.1 % (ref 40–54)
HEMOGLOBIN: 9.5 G/DL (ref 13–18)
IRON: 39 UG/DL (ref 59–158)
MCH RBC QN AUTO: 28.4 PG (ref 27–32)
MCHC RBC AUTO-ENTMCNC: 29.6 G/DL (ref 31–35)
MCV RBC AUTO: 95.8 FL (ref 80–100)
PDW BLD-RTO: 17.4 % (ref 11–16)
PLATELET # BLD: 248 K/UL (ref 150–400)
PMV BLD AUTO: 11 FL (ref 6–10)
POTASSIUM SERPL-SCNC: 4.1 MMOL/L (ref 3.4–5.1)
RBC # BLD: 3.35 M/UL (ref 4.5–6)
SODIUM BLD-SCNC: 142 MMOL/L (ref 136–145)
TOTAL IRON BINDING CAPACITY: 144 UG/DL (ref 250–450)
TOTAL PROTEIN: 5.1 G/DL (ref 6.4–8.3)
VITAMIN B-12: >2000 PG/ML (ref 211–911)
WBC # BLD: 5.7 K/UL (ref 4–11)

## 2020-12-14 PROCEDURE — 83550 IRON BINDING TEST: CPT

## 2020-12-14 PROCEDURE — 36415 COLL VENOUS BLD VENIPUNCTURE: CPT

## 2020-12-14 PROCEDURE — 83540 ASSAY OF IRON: CPT

## 2020-12-14 PROCEDURE — 85027 COMPLETE CBC AUTOMATED: CPT

## 2020-12-14 PROCEDURE — 80053 COMPREHEN METABOLIC PANEL: CPT

## 2020-12-14 PROCEDURE — 82607 VITAMIN B-12: CPT

## 2020-12-14 PROCEDURE — 82746 ASSAY OF FOLIC ACID SERUM: CPT

## 2020-12-14 PROCEDURE — 82728 ASSAY OF FERRITIN: CPT

## 2021-01-28 ENCOUNTER — HOSPITAL ENCOUNTER (OUTPATIENT)
Facility: HOSPITAL | Age: 86
Discharge: HOME | End: 2021-01-28
Payer: MEDICARE

## 2021-02-10 ENCOUNTER — HOSPITAL ENCOUNTER (OUTPATIENT)
Facility: HOSPITAL | Age: 86
Discharge: HOME | End: 2021-02-10
Payer: MEDICARE

## 2022-09-06 NOTE — PLAN OF CARE
----- Message from Deana Carlisle sent at 9/6/2022 11:30 AM CDT -----  Contact: pt  .Type:  RX Refill Request    Who Called: chris  Refill or New Rx:refill  RX Name and Strength:gabapentin (NEURONTIN) 800 MG tablet lisinopriL (PRINIVIL,ZESTRIL) 5 MG tablet  How is the patient currently taking it? (ex. 1XDay):  Is this a 30 day or 90 day RX:  Preferred Pharmacy with phone number:.  [facility] [place]       Local or Mail Order:local  Ordering Provider:MARINO   Would the patient rather a call back or a response via [facility]?   Best Call Back Number:.[phone]    Additional Information:          Goal Outcome Evaluation:  Plan of Care Reviewed With: patient  Progress: improving  Outcome Summary: Pt had improved PO intake during shift eating > 50% for all meals. VSS, possible DC home w HH in few days. Will continue to monitor.

## 2024-09-04 ENCOUNTER — APPOINTMENT (OUTPATIENT)
Dept: GENERAL RADIOLOGY | Facility: HOSPITAL | Age: 89
End: 2024-09-04
Payer: OTHER GOVERNMENT

## 2024-09-04 ENCOUNTER — HOSPITAL ENCOUNTER (EMERGENCY)
Facility: HOSPITAL | Age: 89
Discharge: HOME | End: 2024-09-04
Attending: STUDENT IN AN ORGANIZED HEALTH CARE EDUCATION/TRAINING PROGRAM
Payer: OTHER GOVERNMENT

## 2024-09-04 VITALS
DIASTOLIC BLOOD PRESSURE: 78 MMHG | SYSTOLIC BLOOD PRESSURE: 146 MMHG | TEMPERATURE: 98.9 F | HEIGHT: 69 IN | RESPIRATION RATE: 20 BRPM | HEART RATE: 98 BPM | WEIGHT: 167.55 LBS | BODY MASS INDEX: 24.82 KG/M2 | OXYGEN SATURATION: 91 %

## 2024-09-04 DIAGNOSIS — J18.9 PNEUMONIA OF LEFT LOWER LOBE DUE TO INFECTIOUS ORGANISM: Primary | ICD-10-CM

## 2024-09-04 DIAGNOSIS — U07.1 COVID-19: ICD-10-CM

## 2024-09-04 DIAGNOSIS — J44.1 COPD EXACERBATION: ICD-10-CM

## 2024-09-04 LAB
ALBUMIN SERPL-MCNC: 4.1 G/DL (ref 3.5–5.2)
ALBUMIN/GLOB SERPL: 1.4 G/DL
ALP SERPL-CCNC: 76 U/L (ref 39–117)
ALT SERPL W P-5'-P-CCNC: 12 U/L (ref 1–41)
ANION GAP SERPL CALCULATED.3IONS-SCNC: 11.3 MMOL/L (ref 5–15)
AST SERPL-CCNC: 16 U/L (ref 1–40)
BASOPHILS # BLD AUTO: 0.04 10*3/MM3 (ref 0–0.2)
BASOPHILS NFR BLD AUTO: 0.4 % (ref 0–1.5)
BILIRUB SERPL-MCNC: 0.7 MG/DL (ref 0–1.2)
BUN SERPL-MCNC: 20 MG/DL (ref 8–23)
BUN/CREAT SERPL: 15 (ref 7–25)
CALCIUM SPEC-SCNC: 8.7 MG/DL (ref 8.6–10.5)
CHLORIDE SERPL-SCNC: 102 MMOL/L (ref 98–107)
CO2 SERPL-SCNC: 24.7 MMOL/L (ref 22–29)
CREAT SERPL-MCNC: 1.33 MG/DL (ref 0.76–1.27)
D DIMER PPP FEU-MCNC: 0.53 MCGFEU/ML (ref 0–0.88)
DEPRECATED RDW RBC AUTO: 45.3 FL (ref 37–54)
EGFRCR SERPLBLD CKD-EPI 2021: 51.4 ML/MIN/1.73
EOSINOPHIL # BLD AUTO: 0 10*3/MM3 (ref 0–0.4)
EOSINOPHIL NFR BLD AUTO: 0 % (ref 0.3–6.2)
ERYTHROCYTE [DISTWIDTH] IN BLOOD BY AUTOMATED COUNT: 13.6 % (ref 12.3–15.4)
GEN 5 2HR TROPONIN T REFLEX: 37 NG/L
GLOBULIN UR ELPH-MCNC: 3 GM/DL
GLUCOSE SERPL-MCNC: 137 MG/DL (ref 65–99)
HCT VFR BLD AUTO: 44.4 % (ref 37.5–51)
HGB BLD-MCNC: 15 G/DL (ref 13–17.7)
HOLD SPECIMEN: NORMAL
HOLD SPECIMEN: NORMAL
IMM GRANULOCYTES # BLD AUTO: 0.04 10*3/MM3 (ref 0–0.05)
IMM GRANULOCYTES NFR BLD AUTO: 0.4 % (ref 0–0.5)
LYMPHOCYTES # BLD AUTO: 0.21 10*3/MM3 (ref 0.7–3.1)
LYMPHOCYTES NFR BLD AUTO: 2 % (ref 19.6–45.3)
MCH RBC QN AUTO: 30.5 PG (ref 26.6–33)
MCHC RBC AUTO-ENTMCNC: 33.8 G/DL (ref 31.5–35.7)
MCV RBC AUTO: 90.4 FL (ref 79–97)
MONOCYTES # BLD AUTO: 0.64 10*3/MM3 (ref 0.1–0.9)
MONOCYTES NFR BLD AUTO: 6.2 % (ref 5–12)
NEUTROPHILS NFR BLD AUTO: 9.45 10*3/MM3 (ref 1.7–7)
NEUTROPHILS NFR BLD AUTO: 91 % (ref 42.7–76)
NRBC BLD AUTO-RTO: 0 /100 WBC (ref 0–0.2)
PLATELET # BLD AUTO: 177 10*3/MM3 (ref 140–450)
PMV BLD AUTO: 11 FL (ref 6–12)
POTASSIUM SERPL-SCNC: 4.2 MMOL/L (ref 3.5–5.2)
PROT SERPL-MCNC: 7.1 G/DL (ref 6–8.5)
RBC # BLD AUTO: 4.91 10*6/MM3 (ref 4.14–5.8)
SODIUM SERPL-SCNC: 138 MMOL/L (ref 136–145)
TROPONIN T DELTA: 4 NG/L
TROPONIN T SERPL HS-MCNC: 33 NG/L
WBC NRBC COR # BLD AUTO: 10.38 10*3/MM3 (ref 3.4–10.8)
WHOLE BLOOD HOLD COAG: NORMAL
WHOLE BLOOD HOLD SPECIMEN: NORMAL

## 2024-09-04 PROCEDURE — 84484 ASSAY OF TROPONIN QUANT: CPT | Performed by: STUDENT IN AN ORGANIZED HEALTH CARE EDUCATION/TRAINING PROGRAM

## 2024-09-04 PROCEDURE — 71045 X-RAY EXAM CHEST 1 VIEW: CPT

## 2024-09-04 PROCEDURE — 36415 COLL VENOUS BLD VENIPUNCTURE: CPT

## 2024-09-04 PROCEDURE — 85379 FIBRIN DEGRADATION QUANT: CPT | Performed by: STUDENT IN AN ORGANIZED HEALTH CARE EDUCATION/TRAINING PROGRAM

## 2024-09-04 PROCEDURE — 96374 THER/PROPH/DIAG INJ IV PUSH: CPT

## 2024-09-04 PROCEDURE — 85025 COMPLETE CBC W/AUTO DIFF WBC: CPT | Performed by: STUDENT IN AN ORGANIZED HEALTH CARE EDUCATION/TRAINING PROGRAM

## 2024-09-04 PROCEDURE — 94640 AIRWAY INHALATION TREATMENT: CPT

## 2024-09-04 PROCEDURE — 99284 EMERGENCY DEPT VISIT MOD MDM: CPT

## 2024-09-04 PROCEDURE — 93005 ELECTROCARDIOGRAM TRACING: CPT | Performed by: STUDENT IN AN ORGANIZED HEALTH CARE EDUCATION/TRAINING PROGRAM

## 2024-09-04 PROCEDURE — 80053 COMPREHEN METABOLIC PANEL: CPT | Performed by: STUDENT IN AN ORGANIZED HEALTH CARE EDUCATION/TRAINING PROGRAM

## 2024-09-04 PROCEDURE — 25010000002 KETOROLAC TROMETHAMINE PER 15 MG: Performed by: STUDENT IN AN ORGANIZED HEALTH CARE EDUCATION/TRAINING PROGRAM

## 2024-09-04 PROCEDURE — 25810000003 SODIUM CHLORIDE 0.9 % SOLUTION: Performed by: STUDENT IN AN ORGANIZED HEALTH CARE EDUCATION/TRAINING PROGRAM

## 2024-09-04 PROCEDURE — 63710000001 PREDNISONE PER 5 MG: Performed by: STUDENT IN AN ORGANIZED HEALTH CARE EDUCATION/TRAINING PROGRAM

## 2024-09-04 RX ORDER — ALBUTEROL SULFATE 0.83 MG/ML
5 SOLUTION RESPIRATORY (INHALATION) ONCE
Status: COMPLETED | OUTPATIENT
Start: 2024-09-04 | End: 2024-09-04

## 2024-09-04 RX ORDER — PREDNISONE 50 MG/1
50 TABLET ORAL DAILY
Qty: 5 TABLET | Refills: 0 | Status: SHIPPED | OUTPATIENT
Start: 2024-09-04

## 2024-09-04 RX ORDER — KETOROLAC TROMETHAMINE 30 MG/ML
15 INJECTION, SOLUTION INTRAMUSCULAR; INTRAVENOUS ONCE
Status: COMPLETED | OUTPATIENT
Start: 2024-09-04 | End: 2024-09-04

## 2024-09-04 RX ORDER — AZITHROMYCIN 250 MG/1
TABLET, FILM COATED ORAL
Qty: 6 TABLET | Refills: 0 | Status: SHIPPED | OUTPATIENT
Start: 2024-09-04

## 2024-09-04 RX ORDER — PREDNISONE 50 MG/1
50 TABLET ORAL ONCE
Status: COMPLETED | OUTPATIENT
Start: 2024-09-04 | End: 2024-09-04

## 2024-09-04 RX ADMIN — SODIUM CHLORIDE 500 ML: 9 INJECTION, SOLUTION INTRAVENOUS at 16:27

## 2024-09-04 RX ADMIN — PREDNISONE 50 MG: 50 TABLET ORAL at 17:08

## 2024-09-04 RX ADMIN — ALBUTEROL SULFATE 5 MG: 2.5 SOLUTION RESPIRATORY (INHALATION) at 17:13

## 2024-09-04 RX ADMIN — IPRATROPIUM BROMIDE 0.5 MG: 0.5 SOLUTION RESPIRATORY (INHALATION) at 17:13

## 2024-09-04 RX ADMIN — KETOROLAC TROMETHAMINE 15 MG: 30 INJECTION, SOLUTION INTRAMUSCULAR; INTRAVENOUS at 17:09

## 2024-09-04 NOTE — ED PROVIDER NOTES
[facility] EMERGENCY DEPARTMENT  Emergency Department Encounter  Emergency Medicine Physician Note       Pt Name: Noe Martel  MRN: [id number]  Pt :   1935  Room Number:    Date of encounter:  2024  PCP: Devika Foy APRN  ED Physician: Tigre Bellamy DO    HPI:  Noe Martel is a 89 y.o. male who presents to the ED with chief complaint of flulike symptoms.  Presents from [facility].  Reportedly had positive COVID test.  Presents with 2 days history of fever, shortness of breath, cough.  Had a temperature of 103 at the clinic.  Duration constant.  No modifying factors.  Has been treating symptoms with honey and breathing treatments with improvement.  No headache, chest pain or abdominal pain, problems urination or bowel movements, leg pain or swelling.  Does not wear oxygen at baseline.    PAST MEDICAL HISTORY  Past Medical History:   Diagnosis Date    BPH (benign prostatic hyperplasia)     CAD (coronary artery disease)     status post drug-eluting stent deployment in the ostium of the right coronary artery and balloon angioplasty of the obtuse marginal branch of the left circumflex, 2015.  Ejection fraction 60%.    Chronic back pain     COPD (chronic obstructive pulmonary disease)     Dyslipidemia      untreated.     GERD (gastroesophageal reflux disease)     Hyperlipidemia     Hypertension     Impaired functional mobility, balance, gait, and endurance      Current Outpatient Medications   Medication Instructions    acetaminophen (TYLENOL) 650 mg, Oral, Every 6 Hours PRN    albuterol sulfate  (90 Base) MCG/ACT inhaler 2 puffs, Inhalation, Every 4 Hours PRN    amoxicillin-clavulanate (AUGMENTIN) 875-125 MG per tablet 1 tablet, Oral, 2 Times Daily    aspirin 81 MG chewable tablet Take 81 mg by mouth daily.      azithromycin (Zithromax Z-Kole) 250 MG tablet Take 2 tablets by mouth on day 1, then 1 tablet daily on days 2-5    budesonide-formoterol  (SYMBICORT) 160-4.5 MCG/ACT inhaler 2 puffs, Inhalation, 2 Times Daily - RT    cyanocobalamin 1,000 mcg, Intramuscular, Once, Due on October 28     ferrous sulfate 300 mg, Oral, 2 Times Daily    folic acid (FOLVITE) 1 mg, Oral, Daily    montelukast (SINGULAIR) 10 mg, Oral, Nightly    nitroglycerin (NITROSTAT) 0.4 MG SL tablet No dose, route, or frequency recorded.    pantoprazole (PROTONIX) 40 mg, Oral, Daily    predniSONE (DELTASONE) 50 mg, Oral, Daily    QUEtiapine (SEROQUEL) 50 mg, Oral, Nightly    terazosin (HYTRIN) 5 mg, Oral, Nightly      PAST SURGICAL HISTORY  Past Surgical History:   Procedure Laterality Date    CARDIAC SURGERY      STENT PLACEMENT    CHOLECYSTECTOMY      HERNIA REPAIR      bilateral       FAMILY HISTORY  History reviewed. No pertinent family history.    SOCIAL HISTORY  Social History     Socioeconomic History    Marital status:    Tobacco Use    Smoking status: Never   Substance and Sexual Activity    Alcohol use: No     ALLERGIES  Elemental sulfur and Sulfa antibiotics    REVIEW OF SYSTEMS  All systems reviewed and negative except for those discussed in HPI.     PHYSICAL EXAM  ED Triage Vitals   Temp Heart Rate Resp BP SpO2   09/04/24 1600 09/04/24 1600 09/04/24 1600 09/04/24 1559 09/04/24 1600   98.9 °F (37.2 °C) 112 24 167/89 95 %      Temp src Heart Rate Source Patient Position BP Location FiO2 (%)   09/04/24 1600 09/04/24 1600 09/04/24 1600 09/04/24 1600 --   Oral Monitor Sitting Left arm      I have reviewed the triage vital signs and nursing notes.    General: Elderly male.  Alert.  Nontoxic appearance.  No acute distress.  Head: Normocephalic.  Atraumatic.  Eyes: No scleral icterus.  ENT: Moist mucous membranes.  Cardiovascular: Tachycardic.  Regular rhythm.  No murmurs.  No rubs.  2+ distal pulses bilaterally.  Respiratory: Equal breath sounds bilaterally.  No rales.  No rhonchi.  + Expiratory wheezing wheezing.  No conversational dyspnea or respiratory distress.  GI:  Abdomen is soft.  Nondistended.  Nontender to palpation.  No rebound.  No guarding.  No CVA tenderness.  MSK: Moves all 4 extremities.  Neurologic: Oriented x 3.  No focal deficits.  Skin: No erythema.  No edema. No pallor. No cyanosis.  Psych: Normal mood and affect.    LAB RESULTS  Recent Results (from the past 24 hour(s))   High Sensitivity Troponin T    Collection Time: 09/04/24  4:28 PM    Specimen: Blood   Result Value Ref Range    HS Troponin T 33 (H) <22 ng/L   Comprehensive Metabolic Panel    Collection Time: 09/04/24  4:28 PM    Specimen: Blood   Result Value Ref Range    Glucose 137 (H) 65 - 99 mg/dL    BUN 20 8 - 23 mg/dL    Creatinine 1.33 (H) 0.76 - 1.27 mg/dL    Sodium 138 136 - 145 mmol/L    Potassium 4.2 3.5 - 5.2 mmol/L    Chloride 102 98 - 107 mmol/L    CO2 24.7 22.0 - 29.0 mmol/L    Calcium 8.7 8.6 - 10.5 mg/dL    Total Protein 7.1 6.0 - 8.5 g/dL    Albumin 4.1 3.5 - 5.2 g/dL    ALT (SGPT) 12 1 - 41 U/L    AST (SGOT) 16 1 - 40 U/L    Alkaline Phosphatase 76 39 - 117 U/L    Total Bilirubin 0.7 0.0 - 1.2 mg/dL    Globulin 3.0 gm/dL    A/G Ratio 1.4 g/dL    BUN/Creatinine Ratio 15.0 7.0 - 25.0    Anion Gap 11.3 5.0 - 15.0 mmol/L    eGFR 51.4 (L) >60.0 mL/min/1.73   D-dimer, Quantitative    Collection Time: 09/04/24  4:28 PM    Specimen: Blood   Result Value Ref Range    D-Dimer, Quantitative 0.53 0.00 - 0.88 MCGFEU/mL   Green Top (Gel)    Collection Time: 09/04/24  4:28 PM   Result Value Ref Range    Extra Tube Hold for add-ons.    Lavender Top    Collection Time: 09/04/24  4:28 PM   Result Value Ref Range    Extra Tube hold for add-on    Gold Top - SST    Collection Time: 09/04/24  4:28 PM   Result Value Ref Range    Extra Tube Hold for add-ons.    Light Blue Top    Collection Time: 09/04/24  4:28 PM   Result Value Ref Range    Extra Tube Hold for add-ons.    CBC Auto Differential    Collection Time: 09/04/24  4:28 PM    Specimen: Blood   Result Value Ref Range    WBC 10.38 3.40 - 10.80 10*3/mm3     RBC 4.91 4.14 - 5.80 10*6/mm3    Hemoglobin 15.0 13.0 - 17.7 g/dL    Hematocrit 44.4 37.5 - 51.0 %    MCV 90.4 79.0 - 97.0 fL    MCH 30.5 26.6 - 33.0 pg    MCHC 33.8 31.5 - 35.7 g/dL    RDW 13.6 12.3 - 15.4 %    RDW-SD 45.3 37.0 - 54.0 fl    MPV 11.0 6.0 - 12.0 fL    Platelets 177 140 - 450 10*3/mm3    Neutrophil % 91.0 (H) 42.7 - 76.0 %    Lymphocyte % 2.0 (L) 19.6 - 45.3 %    Monocyte % 6.2 5.0 - 12.0 %    Eosinophil % 0.0 (L) 0.3 - 6.2 %    Basophil % 0.4 0.0 - 1.5 %    Immature Grans % 0.4 0.0 - 0.5 %    Neutrophils, Absolute 9.45 (H) 1.70 - 7.00 10*3/mm3    Lymphocytes, Absolute 0.21 (L) 0.70 - 3.10 10*3/mm3    Monocytes, Absolute 0.64 0.10 - 0.90 10*3/mm3    Eosinophils, Absolute 0.00 0.00 - 0.40 10*3/mm3    Basophils, Absolute 0.04 0.00 - 0.20 10*3/mm3    Immature Grans, Absolute 0.04 0.00 - 0.05 10*3/mm3    nRBC 0.0 0.0 - 0.2 /100 WBC       RADIOLOGY  XR Chest 1 View    Result Date: 9/4/2024  FINAL REPORT TECHNIQUE: null CLINICAL HISTORY: Weakness COMPARISON: null FINDINGS: 1 view chest x-ray. Comparison: None Findings: Lung volumes are decreased and there is bilateral basilar atelectasis. Mild heart enlargement, magnified by portable technique. No acute fracture.     Impression: Focal left lung basilar segment consolidation, consistent with alveolar pulmonary infection/pneumonia. Authenticated and Electronically Signed by Davy Fung MD on 09/04/2024 06:25:49 PM     PROCEDURES  Procedures    RISK STRATIFICATION    MEDICAL DECISION MAKING  89 y.o. male with past medical history listed above who presents with flulike symptoms, shortness of breath, wheezing.  Recent positive COVID-19 test.    Vital signs remarkable for tachycardia, pulse ox 95% on room air.    Based on clinical presentation and physical exam, differential diagnosis includes, but is not limited to, viral URI, bronchitis, COPD extubation, pneumonia, pulmonary embolism, myocarditis, acute coronary syndrome.    At least 3 different tests have  been ordered on this patient.    Please see ED course below for my interpretation of the ED workup.  ED Course as of 09/05/24 1108   Wed Sep 04, 2024   1708 ECG 12 Lead Other; Weakness  EKG per my interpretation sinus tachycardia, rate 110, left axis deviation, no STEMI, normal QRS QTc intervals.   [JS]   1855 I reviewed the labs listed above. Notable findings are highlighted below.    Old laboratory data was reviewed from the medical records and compared to today's results.   [JS]   1855 CBC & Differential(!) [JS]   1855 Comprehensive Metabolic Panel(!) [JS]   1855 Creatinine(!): 1.33 [JS]   1855 HS Troponin T(!): 33 [JS]   1855 D-Dimer, Quant: 0.53 [JS]   1855 XR Chest 1 View  I have independently reviewed and interpreted the chest x-ray.  My interpretation is left lower lobe infiltrate.    [JS]   1916 HS Troponin T(!): 37 [JS]      ED Course User Index  [JS] Tigre Bellamy DO     Medications administered in ED:  Medications   sodium chloride 0.9 % bolus 500 mL (0 mL Intravenous Stopped 9/4/24 1709)   albuterol (PROVENTIL) nebulizer solution 0.083% 2.5 mg/3mL (5 mg Nebulization Given 9/4/24 1713)   ipratropium (ATROVENT) nebulizer solution 0.5 mg (0.5 mg Nebulization Given 9/4/24 1713)   ketorolac (TORADOL) injection 15 mg (15 mg Intravenous Given 9/4/24 1709)   predniSONE (DELTASONE) tablet 50 mg (50 mg Oral Given 9/4/24 1708)     On re-evaluation, patient resting comfortably.  States symptoms have improved following therapy. Vital signs remained stable on room air.  Patient was ambulatory in the ED with steady gait.  Able to tolerate oral intake appropriately.    I discussed the findings of the ED workup with the patient and his wife at bedside. Shared decision making discussed with the patient in regards to disposition. Given pneumonia, age, and past medical history I recommended admission for observation and IV antibiotics. Patient states that he feels better and does not want to proceed with admission.  Patient was informed of the indication, benefits, alternative diagnostic options, and risks including, but not limited to missed and/or delayed diagnosis, therefore leading to failure to treat. The patient is clinically not intoxicated, free from distracting pain, appears to have intact insight, judgment and reason and in my medical opinion has the capacity to make medical decisions.    I recommended outpatient follow-up with PCP.  Patient was deemed medically stable for discharge with close outpatient follow-up and strict ED return precautions. Patient's wife also agreeable with plan and disposition.    Home medications were reviewed.  Prescriptions for discharge: Augmentin, Z-Kole, prednisone    Chronic conditions affecting care: COPD    Social determinants of health impacting treatment or disposition: None    REPEAT VITAL SIGNS  AS OF 18:55 EDT VITALS:  BP - 144/79  HR - 101  TEMP - 98.9 °F (37.2 °C) (Oral)  O2 SATS - 90%    DIAGNOSIS  Final diagnoses:   Pneumonia of left lower lobe due to infectious organism   COVID-19   COPD exacerbation     DISPOSITION  ED Disposition       ED Disposition   Discharge    Condition   Stable    Comment   --               PATIENT REFERRALS  Devika Foy, APRN  [address] [place] [address] [phone]      PCP. 48 hours.            Please note that portions of this document were completed with voice recognition software.        Tigre Bellamy DO  09/05/24 [id number]